# Patient Record
Sex: FEMALE | Race: WHITE | NOT HISPANIC OR LATINO | Employment: FULL TIME | URBAN - METROPOLITAN AREA
[De-identification: names, ages, dates, MRNs, and addresses within clinical notes are randomized per-mention and may not be internally consistent; named-entity substitution may affect disease eponyms.]

---

## 2021-07-06 ENCOUNTER — HOSPITAL ENCOUNTER (OUTPATIENT)
Dept: RADIOLOGY | Facility: MEDICAL CENTER | Age: 76
End: 2021-07-06
Payer: MEDICARE

## 2021-07-06 ENCOUNTER — HOSPITAL ENCOUNTER (INPATIENT)
Facility: MEDICAL CENTER | Age: 76
LOS: 3 days | DRG: 552 | End: 2021-07-09
Attending: EMERGENCY MEDICINE | Admitting: STUDENT IN AN ORGANIZED HEALTH CARE EDUCATION/TRAINING PROGRAM
Payer: MEDICARE

## 2021-07-06 DIAGNOSIS — R53.81 DEBILITY: ICD-10-CM

## 2021-07-06 DIAGNOSIS — S22.069A CLOSED FRACTURE OF EIGHTH THORACIC VERTEBRA, UNSPECIFIED FRACTURE MORPHOLOGY, INITIAL ENCOUNTER (HCC): ICD-10-CM

## 2021-07-06 DIAGNOSIS — S22.062A: ICD-10-CM

## 2021-07-06 LAB — MAGNESIUM SERPL-MCNC: 2.3 MG/DL (ref 1.5–2.5)

## 2021-07-06 PROCEDURE — 700102 HCHG RX REV CODE 250 W/ 637 OVERRIDE(OP): Performed by: STUDENT IN AN ORGANIZED HEALTH CARE EDUCATION/TRAINING PROGRAM

## 2021-07-06 PROCEDURE — A9270 NON-COVERED ITEM OR SERVICE: HCPCS | Performed by: STUDENT IN AN ORGANIZED HEALTH CARE EDUCATION/TRAINING PROGRAM

## 2021-07-06 PROCEDURE — 99285 EMERGENCY DEPT VISIT HI MDM: CPT

## 2021-07-06 PROCEDURE — 36415 COLL VENOUS BLD VENIPUNCTURE: CPT

## 2021-07-06 PROCEDURE — 83735 ASSAY OF MAGNESIUM: CPT

## 2021-07-06 PROCEDURE — 96374 THER/PROPH/DIAG INJ IV PUSH: CPT

## 2021-07-06 PROCEDURE — L0150 CERV SEMI-RIG ADJ MOLDED CHN: HCPCS

## 2021-07-06 PROCEDURE — 700102 HCHG RX REV CODE 250 W/ 637 OVERRIDE(OP): Performed by: NURSE PRACTITIONER

## 2021-07-06 PROCEDURE — 770006 HCHG ROOM/CARE - MED/SURG/GYN SEMI*

## 2021-07-06 PROCEDURE — 700111 HCHG RX REV CODE 636 W/ 250 OVERRIDE (IP): Performed by: STUDENT IN AN ORGANIZED HEALTH CARE EDUCATION/TRAINING PROGRAM

## 2021-07-06 PROCEDURE — 700105 HCHG RX REV CODE 258: Performed by: HOSPITALIST

## 2021-07-06 PROCEDURE — 99223 1ST HOSP IP/OBS HIGH 75: CPT | Mod: AI | Performed by: STUDENT IN AN ORGANIZED HEALTH CARE EDUCATION/TRAINING PROGRAM

## 2021-07-06 PROCEDURE — L0458 TLSO 2MOD SYMPHIS-XIPHO PRE: HCPCS

## 2021-07-06 PROCEDURE — A9270 NON-COVERED ITEM OR SERVICE: HCPCS | Performed by: NURSE PRACTITIONER

## 2021-07-06 RX ORDER — AMOXICILLIN 250 MG
2 CAPSULE ORAL 2 TIMES DAILY
Status: DISCONTINUED | OUTPATIENT
Start: 2021-07-06 | End: 2021-07-09 | Stop reason: HOSPADM

## 2021-07-06 RX ORDER — ACETAMINOPHEN 325 MG/1
650 TABLET ORAL EVERY 6 HOURS PRN
Status: DISCONTINUED | OUTPATIENT
Start: 2021-07-06 | End: 2021-07-09 | Stop reason: HOSPADM

## 2021-07-06 RX ORDER — ATENOLOL 50 MG/1
50 TABLET ORAL DAILY
Status: DISCONTINUED | OUTPATIENT
Start: 2021-07-06 | End: 2021-07-09 | Stop reason: HOSPADM

## 2021-07-06 RX ORDER — VITAMIN E 268 MG
400 CAPSULE ORAL DAILY
Status: ON HOLD | COMMUNITY
End: 2021-07-17

## 2021-07-06 RX ORDER — BISACODYL 10 MG
10 SUPPOSITORY, RECTAL RECTAL
Status: DISCONTINUED | OUTPATIENT
Start: 2021-07-06 | End: 2021-07-09 | Stop reason: HOSPADM

## 2021-07-06 RX ORDER — HEPARIN SODIUM 5000 [USP'U]/ML
5000 INJECTION, SOLUTION INTRAVENOUS; SUBCUTANEOUS EVERY 8 HOURS
Status: DISCONTINUED | OUTPATIENT
Start: 2021-07-06 | End: 2021-07-09 | Stop reason: HOSPADM

## 2021-07-06 RX ORDER — METHOCARBAMOL 500 MG/1
500 TABLET, FILM COATED ORAL 4 TIMES DAILY
Status: DISCONTINUED | OUTPATIENT
Start: 2021-07-06 | End: 2021-07-06

## 2021-07-06 RX ORDER — OXYCODONE HYDROCHLORIDE 5 MG/1
2.5 TABLET ORAL
Status: DISCONTINUED | OUTPATIENT
Start: 2021-07-06 | End: 2021-07-07

## 2021-07-06 RX ORDER — ENALAPRILAT 1.25 MG/ML
1.25 INJECTION INTRAVENOUS EVERY 6 HOURS PRN
Status: DISCONTINUED | OUTPATIENT
Start: 2021-07-06 | End: 2021-07-09 | Stop reason: HOSPADM

## 2021-07-06 RX ORDER — HYDROMORPHONE HYDROCHLORIDE 1 MG/ML
0.25 INJECTION, SOLUTION INTRAMUSCULAR; INTRAVENOUS; SUBCUTANEOUS
Status: DISCONTINUED | OUTPATIENT
Start: 2021-07-06 | End: 2021-07-07

## 2021-07-06 RX ORDER — IBUPROFEN 200 MG
400-800 TABLET ORAL DAILY
Status: ON HOLD | COMMUNITY
End: 2021-07-09

## 2021-07-06 RX ORDER — SODIUM CHLORIDE 9 MG/ML
INJECTION, SOLUTION INTRAVENOUS CONTINUOUS
Status: DISCONTINUED | OUTPATIENT
Start: 2021-07-06 | End: 2021-07-07

## 2021-07-06 RX ORDER — POLYETHYLENE GLYCOL 3350 17 G/17G
1 POWDER, FOR SOLUTION ORAL
Status: DISCONTINUED | OUTPATIENT
Start: 2021-07-06 | End: 2021-07-09 | Stop reason: HOSPADM

## 2021-07-06 RX ORDER — FLUTICASONE PROPIONATE 50 MCG
1 SPRAY, SUSPENSION (ML) NASAL
Status: ON HOLD | COMMUNITY
End: 2021-07-17

## 2021-07-06 RX ORDER — DIAZEPAM 5 MG/1
5 TABLET ORAL EVERY 6 HOURS PRN
Status: DISCONTINUED | OUTPATIENT
Start: 2021-07-06 | End: 2021-07-09 | Stop reason: HOSPADM

## 2021-07-06 RX ORDER — OXYCODONE HYDROCHLORIDE 5 MG/1
5 TABLET ORAL
Status: DISCONTINUED | OUTPATIENT
Start: 2021-07-06 | End: 2021-07-07

## 2021-07-06 RX ORDER — ONDANSETRON 4 MG/1
4 TABLET, ORALLY DISINTEGRATING ORAL EVERY 4 HOURS PRN
Status: DISCONTINUED | OUTPATIENT
Start: 2021-07-06 | End: 2021-07-09 | Stop reason: HOSPADM

## 2021-07-06 RX ORDER — ATENOLOL 50 MG/1
50 TABLET ORAL DAILY
COMMUNITY

## 2021-07-06 RX ORDER — LABETALOL HYDROCHLORIDE 5 MG/ML
10 INJECTION, SOLUTION INTRAVENOUS EVERY 4 HOURS PRN
Status: DISCONTINUED | OUTPATIENT
Start: 2021-07-06 | End: 2021-07-09 | Stop reason: HOSPADM

## 2021-07-06 RX ORDER — CYCLOBENZAPRINE HCL 10 MG
10 TABLET ORAL 3 TIMES DAILY PRN
Status: DISCONTINUED | OUTPATIENT
Start: 2021-07-06 | End: 2021-07-07

## 2021-07-06 RX ORDER — M-VIT,TX,IRON,MINS/CALC/FOLIC 27MG-0.4MG
1 TABLET ORAL DAILY
Status: ON HOLD | COMMUNITY
End: 2021-07-17

## 2021-07-06 RX ORDER — ONDANSETRON 2 MG/ML
4 INJECTION INTRAMUSCULAR; INTRAVENOUS EVERY 4 HOURS PRN
Status: DISCONTINUED | OUTPATIENT
Start: 2021-07-06 | End: 2021-07-09 | Stop reason: HOSPADM

## 2021-07-06 RX ADMIN — OXYCODONE 5 MG: 5 TABLET ORAL at 20:23

## 2021-07-06 RX ADMIN — HYDROMORPHONE HYDROCHLORIDE 0.25 MG: 1 INJECTION, SOLUTION INTRAMUSCULAR; INTRAVENOUS; SUBCUTANEOUS at 05:37

## 2021-07-06 RX ADMIN — CYCLOBENZAPRINE 10 MG: 10 TABLET, FILM COATED ORAL at 14:19

## 2021-07-06 RX ADMIN — HYDROMORPHONE HYDROCHLORIDE 0.25 MG: 1 INJECTION, SOLUTION INTRAMUSCULAR; INTRAVENOUS; SUBCUTANEOUS at 22:05

## 2021-07-06 RX ADMIN — DOCUSATE SODIUM 50 MG AND SENNOSIDES 8.6 MG 2 TABLET: 8.6; 5 TABLET, FILM COATED ORAL at 06:58

## 2021-07-06 RX ADMIN — HYDROMORPHONE HYDROCHLORIDE 0.25 MG: 1 INJECTION, SOLUTION INTRAMUSCULAR; INTRAVENOUS; SUBCUTANEOUS at 15:52

## 2021-07-06 RX ADMIN — HEPARIN SODIUM 5000 UNITS: 5000 INJECTION, SOLUTION INTRAVENOUS; SUBCUTANEOUS at 06:58

## 2021-07-06 RX ADMIN — HYDROMORPHONE HYDROCHLORIDE 0.25 MG: 1 INJECTION, SOLUTION INTRAMUSCULAR; INTRAVENOUS; SUBCUTANEOUS at 11:42

## 2021-07-06 RX ADMIN — OXYCODONE 5 MG: 5 TABLET ORAL at 10:57

## 2021-07-06 RX ADMIN — SODIUM CHLORIDE: 9 INJECTION, SOLUTION INTRAVENOUS at 09:45

## 2021-07-06 RX ADMIN — HEPARIN SODIUM 5000 UNITS: 5000 INJECTION, SOLUTION INTRAVENOUS; SUBCUTANEOUS at 22:05

## 2021-07-06 RX ADMIN — OXYCODONE 5 MG: 5 TABLET ORAL at 14:19

## 2021-07-06 RX ADMIN — ATENOLOL 50 MG: 50 TABLET ORAL at 06:58

## 2021-07-06 ASSESSMENT — PAIN DESCRIPTION - PAIN TYPE
TYPE: ACUTE PAIN

## 2021-07-06 NOTE — THERAPY
Missed Therapy     Patient Name: Peg Louie  Age:  76 y.o., Sex:  female  Medical Record #: [id number]  Today's Date: 7/6/2021    Discussed missed therapy with RN    OT consult rec'd. No formal orders of when to wear TLSO, and no mention in neurosurgery note/chart of cervical fx and bracing/possible intervention, although RN reports c-collar on AAT. Will hold eval and will re-attempt as appropriate/able once brace orders placed and pt has definitive POC.

## 2021-07-06 NOTE — PROGRESS NOTES
Received bedside report from night shift nurse. Patient resting in bed, call light and personal belongings within reach, bed in the low and locked position with upper side rails up. Assessment complete, vital signs stable, all needs met at this time. Hourly rounding in place, plan of care discussed with patient, patient verbalized understanding.

## 2021-07-06 NOTE — CARE PLAN
Problem: Pain - Standard  Goal: Alleviation of pain or a reduction in pain to the patient’s comfort goal  Outcome: Progressing     Problem: Fall Risk  Goal: Patient will remain free from falls  Outcome: Progressing   The patient is Stable - Low risk of patient condition declining or worsening

## 2021-07-06 NOTE — CONSULTS
DATE OF SERVICE:  07/06/2021     NEUROSURGERY CONSULTATION     REASON FOR CONSULTATION:  T7 compression fracture.     HISTORY OF PRESENT ILLNESS:  This is a 76-year-old woman who presents in   direct transfer from _____after having a ground level fall this afternoon and   having onset of back pain.  The patient appears to have baseline dementia, was   reported to be ambulatory prior to this fall and was ambulating afterwards.   She arrives in a C-collar, in a state of delirium.  Otherwise, she is able to   follow commands with wiggling her toes.  She has no focal deficits.    Neurosurgery was consulted to evaluate the T7 fracture.     REVIEW OF SYSTEMS:  A 12-point review of systems negative.     PAST MEDICAL HISTORY:  Unknown.     PAST SURGICAL HISTORY:  Noncontributory.     MEDICATIONS:  Please see EMR.     PHYSICAL EXAMINATION:    NEUROLOGIC:  The patient is awake.  She is very disoriented.  She is   perseverating for water.  She will follow commands and move her legs,   particularly to painful stimuli.  There is no focal deficit in the bilateral   lower extremities, bilateral upper extremities and appears to be   neurologically intact.  BACK:  The patient is complaining of back pain.     IMAGING:  CT of the thoracic spine shows a mid thoracic compression fracture   approximately the level of T7 due to ground level fall, osteoporotic fracture,   no splaying of the facets, there appears to be possibly a spinous process   fracture at the same level.     ASSESSMENT AND PLAN:  At this time, it would be reasonable to place the   patient in a TLSO brace and then have her follow up in 6 weeks with standing   films in clinic for clearance.  She can have standing films tomorrow in the   brace prior to discharge for documentation.  It would be reasonable to get   physical therapy and occupational therapy on the patient prior to discharge to   ensure that she is mobilizing well in the brace and tolerating the pain from    the fracture. At this time, there is no neurosurgical intervention.  She   should have a trial of conservative treatment prior to moving forward with any   sort of intervention such as kyphoplasty or other interventions.     Total of 50 minutes were spent in direct patient care, coordination and   consultation.        ______________________________  MD TIM Meyers/[place]    DD:  07/06/2021 03:59  DT:  07/06/2021 05:08    Job#:  [id number]

## 2021-07-06 NOTE — PROGRESS NOTES
Assumed care of patient. Plan of care discussed, questions answered. Assessment completed. VSS, A&O x4, states pain on mid back. PRN med ordered. Call light in reach. Patient educated on use of call light for assistance.    Cervical collar on at all times.

## 2021-07-06 NOTE — ED NOTES
Patient states she was at the back of a camper van when she tried to reach to close the door and fell out. Truck her forehead. C/o neck and severe back pain.

## 2021-07-06 NOTE — ASSESSMENT & PLAN NOTE
T8-Severe anterior wedge compression fracture with bursting pattern, greater than 50% height loss..  Posterior wall retropulsed 5 mm.  Spinal canal not significantly narrowed.  Right superior articular facet and right transverse process or fracture.  This fracture involves all 3 columns and is therefore unstable.  T7 spinous fracture at its tip.  Hemangioma T11 vertebral body.  -- neurosurgery has evaluated the patient, stated no surgical intervention    --Multimodal pain management, continue gabapentin, tramadol, oxycodone, and muscle relaxant.  PT/OT has evaluated, recommend postacute, SNF treatment in place/physiatry referral in place  Case has been discussed with patient, her daughter and NS/case  Management     Patient stated that her pain has been better controlled  X-ray of the C-spine, T-spine, L-spine pending

## 2021-07-06 NOTE — PROGRESS NOTES
This is 76-year-old female with a past medical history significant for obesity with Body mass index is 39.14 kg/m².  , arrhythmia, htn Presented to ER on 7/6/2021 [facility] facility after standard ground-level fall.    She reports that he was trying to close the door while sitting on the side of the bed, she lost balance ; fell into the ground hitting her head and back.    CT of the thoracic spine: T8-Severe anterior wedge compression fracture with bursting pattern, greater than 50% height loss..  Posterior wall retropulsed 5 mm T7 spinous fracture at its tip.   CT head: No evidence of mass or bleed.  CT cervical spine, DJD without acute fractures or dislocations.  CT of the abdomen/pelvis: No abdominal injury found, large hiatal hernia.    Neurosurgery Dr. Eduardo was consulted who recommended no surgical intervention.  Recommended obtaining T SLO brace, PT/OT ; and pain management.     Plan:  Compression fracture: PT/OT, pain management, muscle relaxant, T LSO brace as per neurosurgery recommendation  --No DVT prophylaxis/ nsaid

## 2021-07-06 NOTE — ED TRIAGE NOTES
Peg Louie  76 y.o.  female  Chief Complaint   Patient presents with   • T-5000 FALL     brought in by Med flight transfer from [facility]. patient sustrained C 7 and T 8 unstable fractures s/p Fall from a van. denies loc. Neuro intact. arrived on [facility] collar.

## 2021-07-06 NOTE — PROGRESS NOTES
Attending Hospitalist is Dr Pham starting at 0700. Please contact this physician for orders, updates or questions today.

## 2021-07-06 NOTE — ED PROVIDER NOTES
ED Provider Note    ED Provider Note    Primary care provider: No primary care provider on file.  Means of arrival: EMS  History obtained from: Patient    CHIEF COMPLAINT  Chief Complaint   Patient presents with   • T-5000 FALL     brought in by Med flight transfer from [facility]. patient sustrained C 7 and T 8 unstable fractures s/p Fall from a van. denies loc. Neuro intact. arrived on [facility] collar.     Seen at 2:29 AM.   HPI  Peg Louie is a 76 y.o. female who presents to the Emergency Department as a transfer from [facility] for unstable thoracic spine fractures.    This is a 76-year-old female who was evaluated at the outside facility after a fall.  She fell out of a camper van onto her back.  She had severe thoracic back pain.  She denies any headache, nausea, vomiting, numbness, focal weakness.  She was ambulatory for a few steps following the incident.  She does not take anticoagulation or antiplatelet agents.    The patient underwent CT of the chest abdomen pelvis, head, cervical and thoracic spine.  CT of the thoracic spine: T8-Severe anterior wedge compression fracture with bursting pattern, greater than 50% height loss..  Posterior wall retropulsed 5 mm.  Spinal canal not significantly narrowed.  Right superior articular facet and right transverse process or fracture.  This fracture involves all 3 columns and is therefore unstable.  T7 spinous fracture at its tip.  Hemangioma T11 vertebral body.    CT head: No evidence of mass or bleed.  CT cervical spine, DJD without acute fractures or dislocations.  CT of the abdomen/pelvis: No abdominal injury found, large hiatal hernia.  Labs from the sending facility: Sodium 140, potassium 4.6, chloride 104, CO2 23, glucose 126, BUN 25, creatinine 1.19, calcium 9.4, LFTs WNL, WBC 11.5, Hgb 12.1 HCT 37, platelets 198, differential WNL.  INR 0.94.  UA WNL.    REVIEW OF SYSTEMS  See HPI,   Remainder of ROS negative.     PAST MEDICAL HISTORY   has a past  "medical history of Irregular heart beat.    SURGICAL HISTORY   has a past surgical history that includes primary c section; cataract extraction with iol; and other orthopedic surgery.    SOCIAL HISTORY  Social History     Tobacco Use   • Smoking status: Never Smoker   • Smokeless tobacco: Never Used   Vaping Use   • Vaping Use: Never used   Substance Use Topics   • Alcohol use: Yes     Comment: 0nce or twice a week   • Drug use: Never      Social History     Substance and Sexual Activity   Drug Use Never       FAMILY HISTORY  History reviewed. No pertinent family history.    CURRENT MEDICATIONS  Reviewed.  See Encounter Summary.     ALLERGIES  No Known Allergies    PHYSICAL EXAM  VITAL SIGNS: /75   Pulse 67   Temp 37 °C (98.6 °F) (Temporal)   Resp 12   Ht 1.676 m (5' 6\")   Wt 107 kg (235 lb)   SpO2 94%   BMI 37.93 kg/m²   Constitutional: Awake, alert in no apparent distress.  [place] collar present.  HENT: Normocephalic, Bilateral external ears normal. Nose normal.   Eyes: Conjunctiva normal, non-icteric, EOMI.    Thorax & Lungs: Easy unlabored respirations, Clear to ascultation bilaterally.  Cardiovascular: Regular rate, Regular rhythm, No murmurs, rubs or gallops. Bilateral pulses symmetrical.   Abdomen:  Soft, nontender, nondistended, normal active bowel sounds.   :    Skin: Visualized skin is  Dry, No erythema, No rash.   Musculoskeletal:   No cyanosis, clubbing or edema. No leg asymmetry.   Neurologic: Alert, Grossly non-focal.  GCS 15, normal speech, sensation intact to light touch throughout, moving all 4 extremities equally without difficulty.  Psychiatric: Normal affect, Normal mood  Lymphatic:  No cervical LAD        RADIOLOGY  OUTSIDE IMAGES-CT CHEST   Final Result      OUTSIDE IMAGES-CT HEAD   Final Result      OUTSIDE IMAGES-CT CERVICAL SPINE   Final Result      OUTSIDE IMAGES-CT ABDOMEN /PELVIS   Final Result            COURSE & MEDICAL DECISION MAKING  Pertinent Labs & Imaging studies " reviewed. (See chart for details)      2:29 AM - Medical record reviewed, patient seen and examined at bedside.    2:42 AM: Case discussed with Dr. Eduardo who is currently scrubbed in on a cauda equina case upstairs.  He will be completed in about 90 minutes and can evaluate the patient at that time.    4 AM: Case discussed with Dr. Eduardo who evaluated the patient.  The patient could potentially be discharged however she will need OT and physical therapy evaluation and fitting for TLSO brace.    4:30 AM: Case discussed with Dr. Reed regarding observation for the above.  He graciously will evaluate the patient for admission.    Decision Making:  This is a pleasant 76 y.o. year old female who presents after mechanical fall.  The patient had a full evaluation at the sending facility to include a CT of the chest abdomen pelvis, head, C-spine and T-spine.  Isolated injuries were noted to the T-spine as above.  The patient is neurologically intact.  Dr. Eduardo has evaluated her and recommends discharge after PT, OT and TLSO fitting.  He will follow her in the clinic after a few weeks of therapy.    Disposition: Anticipate observation.      FINAL IMPRESSION  1. Closed unstable burst fracture of eighth thoracic vertebra, initial encounter ([facility])

## 2021-07-06 NOTE — H&P
"Hospital Medicine History & Physical Note    Date of Service  7/6/2021    Primary Care Physician  No primary care provider on file.    Consultants  neurosurgery    Specialist Names: Dr. Eduardo consulted by ED, to see patient in 90min after finishing a surgery.    Code Status  No Order    Chief Complaint  Chief Complaint   Patient presents with   • T-5000 FALL     brought in by Med flight transfer from [facility]. patient sustrained C 7 and T 8 unstable fractures s/p Fall from a van. denies loc. Neuro intact. arrived on [place] J collar.       History of Presenting Illness  Patient transferred here for a T7, T8 fracture from [facility] which is unstable. There is no additional information obtained in my interview that is not in the ED note by Dr. Ramey quoted below which includes labs and summary from [facility]:    \"This is a 76-year-old female who was evaluated at the outside facility after a fall.  She fell out of a camper van onto her back.  She had severe thoracic back pain.  She denies any headache, nausea, vomiting, numbness, focal weakness.  She was ambulatory for a few steps following the incident.  She does not take anticoagulation or antiplatelet agents.     The patient underwent CT of the chest abdomen pelvis, head, cervical and thoracic spine.  CT of the thoracic spine: T8-Severe anterior wedge compression fracture with bursting pattern, greater than 50% height loss..  Posterior wall retropulsed 5 mm.  Spinal canal not significantly narrowed.  Right superior articular facet and right transverse process or fracture.  This fracture involves all 3 columns and is therefore unstable.  T7 spinous fracture at its tip.  Hemangioma T11 vertebral body.      CT head: No evidence of mass or bleed.  CT cervical spine, DJD without acute fractures or dislocations.  CT of the abdomen/pelvis: No abdominal injury found, large hiatal hernia.  Labs from the sending facility: Sodium 140, potassium 4.6, " "chloride 104, CO2 23, glucose 126, BUN 25, creatinine 1.19, calcium 9.4, LFTs WNL, WBC 11.5, Hgb 12.1 HCT 37, platelets 198, differential WNL.  INR 0.94.  UA WNL.\"    Will admit patient for eval by Neurosurgery with intervention vs. Brace fitting, pain management, and obs overnight as [place] is long distance to travel back.    I discussed the plan of care with patient.    Review of Systems  ROS  All systems reviewed and negative except as noted in HPI.    Past Medical History   has a past medical history of Irregular heart beat.    Surgical History   has a past surgical history that includes primary c section; cataract extraction with iol; and other orthopedic surgery.     Family History  Family history reviewed with patient. There is no family history that is pertinent to the chief complaint.     Social History   reports that she has never smoked. She has never used smokeless tobacco. She reports current alcohol use. She reports that she does not use drugs.    Allergies  No Known Allergies    Medications  Prior to Admission Medications   Prescriptions Last Dose Informant Patient Reported? Taking?   Ascorbic Acid (VITAMIN C PO) 7/5/2021 at AM Patient Yes Yes   Sig: Take 1 tablet by mouth every morning.   B Complex Vitamins (VITAMIN B COMPLEX PO) 7/5/2021 at AM Patient Yes Yes   Sig: Take 1 capsule by mouth every morning.   CALCIUM PO 7/5/2021 at AM Patient Yes Yes   Sig: Take 1 tablet by mouth every day.   POTASSIUM PO 7/5/2021 at AM Patient Yes Yes   Sig: Take 1 tablet by mouth every day.   atenolol (TENORMIN) 50 MG Tab 7/5/2021 at AM Patient Yes Yes   Sig: Take 50 mg by mouth every day.   fluticasone (FLONASE) 50 MCG/ACT nasal spray 7/4/2021 at PM Patient Yes Yes   Sig: Administer 1 Spray into affected nostril(S) at bedtime.   ibuprofen (MOTRIN) 200 MG Tab 7/5/2021 at AM Patient Yes Yes   Sig: Take 400-800 mg by mouth every day.   therapeutic multivitamin-minerals (THERAGRAN-M) Tab 7/5/2021 at AM Patient Yes Yes "   Sig: Take 1 tablet by mouth every day.   vitamin e (VITAMIN E) 400 UNIT Cap 7/5/2021 at AM Patient Yes Yes   Sig: Take 400 Units by mouth every day.      Facility-Administered Medications: None       Physical Exam  Temp:  [37 °C (98.6 °F)] 37 °C (98.6 °F)  Pulse:  [67-73] 67  Resp:  [12-20] 12  BP: (125-140)/(63-75) 137/75  SpO2:  [93 %-95 %] 94 %    Physical Exam  Physical Exam  Vitals and nursing note reviewed.  Constitutional:     General: Alert in no acute distress.    Appearance: Pt is not ill-appearing.     Comments: [place] J collar  HENT: No signs of trauma, Bilateral external ears normal, Nose normal.      Head: Normocephalic.     Mouth/Throat: Unremarkable. Moist Mucosa     Comments:   Eyes:      Pupils: Pupils are equal round and reactive to light, Conjunctiva normal, Non-icteric.   Neck: Normal range of motion, No tenderness, Supple, No stridor.   Cardiovascular:      Rate and Rhythm: Regular Rate and Rhythm     Heart sounds: No murmur, rubs, or gallops appreciated.  Pulmonary/Thorax & Lungs:      Effort: No respiratory distress.     Breath sounds: No stridor. No wheezing, No Rhonchi, no palpable chest tenderness     Comments:    Abdomen:     General: There is no distension.      Palpation/Auscultation: Soft, No tenderness, No masses, No pulsatile masses. Bowel sounds normal. No rebound/peritoneal signs. Negative Parikh sign.     Hernia: No hernia is appreciated at this time.   Skin: Warm, Dry, No erythema, No rash.       Coloration: Skin is not jaundiced or pale.   Back: No bony tenderness, No CVA tenderness.   Musculoskeletal:         General: No swelling or tenderness.   Extremities:       General: Intact distal pulses, No edema, No tenderness, No cyanosis      Vascular:   Neurologic/Psych: Alert, No focal deficits noted.       Comments:     moving all 4 extremities equally without difficulty         Laboratory:          No results for input(s): ALTSGPT, ASTSGOT, ALKPHOSPHAT, TBILIRUBIN, DBILIRUBIN,  GAMMAGT, AMYLASE, LIPASE, ALB, PREALBUMIN, GLUCOSE in the last 72 hours.      No results for input(s): NTPROBNP in the last 72 hours.      No results for input(s): TROPONINT in the last 72 hours.    Imaging:  OUTSIDE IMAGES-CT CHEST   Final Result      OUTSIDE IMAGES-CT HEAD   Final Result      OUTSIDE IMAGES-CT CERVICAL SPINE   Final Result      OUTSIDE IMAGES-CT ABDOMEN /PELVIS   Final Result          X-Ray:  I have personally reviewed the images and compared with prior images.  EKG:  I have personally reviewed the images and compared with prior images.    Assessment/Plan:  I anticipate this patient is appropriate for observation status at this time.    Spinal fracture of T8 vertebra (HCC)- (present on admission)  Assessment & Plan  - CT imaging as summarized above.  T8-Severe anterior wedge compression fracture with bursting pattern, greater than 50% height loss..  Posterior wall retropulsed 5 mm.  Spinal canal not significantly narrowed.  Right superior articular facet and right transverse process or fracture.  This fracture involves all 3 columns and is therefore unstable.  T7 spinous fracture at its tip.  Hemangioma T11 vertebral body.  >Pain Control  >Eval by Neurosurg to be performed by Dr. Eduardo after he gets out of surgery per ED physician who consulted him.  Consider brace    Spinal fracture of T7 vertebra (HCC)  Assessment & Plan  T8-Severe anterior wedge compression fracture with bursting pattern, greater than 50% height loss..  Posterior wall retropulsed 5 mm.  Spinal canal not significantly narrowed.  Right superior articular facet and right transverse process or fracture.  This fracture involves all 3 columns and is therefore unstable.  T7 spinous fracture at its tip.  Hemangioma T11 vertebral body.  >Pain Control  >Eval by Neurosurg to be performed by Dr. Eduardo after he gets out of surgery per ED physician who consulted him.  Consider brace  - CT imaging as summarized above.      VTE prophylaxis:  heparin ppx

## 2021-07-06 NOTE — ED NOTES
Med Rec completed: per patient at bedside  Preferred Pharmacy: Unknown patient is from [place]   Allergies:  No Known Allergies    No ORAL antibiotics in last 14 days    Home Medications:  Medication Sig Comments   • atenolol (TENORMIN) 50 MG Tab Take 50 mg by mouth every day.    • ibuprofen (MOTRIN) 200 MG Tab Take 400-800 mg by mouth every day.    • fluticasone (FLONASE) 50 MCG/ACT nasal spray Administer 1 Spray into affected nostril(S) at bedtime.    • therapeutic multivitamin-minerals (THERAGRAN-M) Tab Take 1 tablet by mouth every day.    • Ascorbic Acid (VITAMIN C PO) Take 1 tablet by mouth every morning.    • B Complex Vitamins (VITAMIN B COMPLEX PO) Take 1 capsule by mouth every morning.    • vitamin e (VITAMIN E) 400 UNIT Cap Take 400 Units by mouth every day.    • CALCIUM PO Take 1 tablet by mouth every day.    • POTASSIUM PO Take 1 tablet by mouth every day.

## 2021-07-07 PROBLEM — E66.9 OBESITY: Status: ACTIVE | Noted: 2021-07-07

## 2021-07-07 PROBLEM — I49.9 ARRHYTHMIA: Status: ACTIVE | Noted: 2021-07-07

## 2021-07-07 PROBLEM — I10 HTN (HYPERTENSION): Status: ACTIVE | Noted: 2021-07-07

## 2021-07-07 PROBLEM — R53.81 DEBILITY: Status: ACTIVE | Noted: 2021-07-07

## 2021-07-07 PROBLEM — E87.1 HYPONATREMIA: Status: ACTIVE | Noted: 2021-07-07

## 2021-07-07 PROBLEM — D64.9 ANEMIA: Status: ACTIVE | Noted: 2021-07-07

## 2021-07-07 LAB
ALBUMIN SERPL BCP-MCNC: 3.9 G/DL (ref 3.2–4.9)
ALBUMIN/GLOB SERPL: 1.3 G/DL
ALP SERPL-CCNC: 74 U/L (ref 30–99)
ALT SERPL-CCNC: 15 U/L (ref 2–50)
ANION GAP SERPL CALC-SCNC: 8 MMOL/L (ref 7–16)
AST SERPL-CCNC: 24 U/L (ref 12–45)
BASOPHILS # BLD AUTO: 0.3 % (ref 0–1.8)
BASOPHILS # BLD: 0.02 K/UL (ref 0–0.12)
BILIRUB SERPL-MCNC: 0.5 MG/DL (ref 0.1–1.5)
BUN SERPL-MCNC: 19 MG/DL (ref 8–22)
CALCIUM SERPL-MCNC: 8.8 MG/DL (ref 8.5–10.5)
CHLORIDE SERPL-SCNC: 98 MMOL/L (ref 96–112)
CHOLEST SERPL-MCNC: 187 MG/DL (ref 100–199)
CO2 SERPL-SCNC: 24 MMOL/L (ref 20–33)
CREAT SERPL-MCNC: 0.73 MG/DL (ref 0.5–1.4)
EOSINOPHIL # BLD AUTO: 0.02 K/UL (ref 0–0.51)
EOSINOPHIL NFR BLD: 0.3 % (ref 0–6.9)
ERYTHROCYTE [DISTWIDTH] IN BLOOD BY AUTOMATED COUNT: 48.9 FL (ref 35.9–50)
GLOBULIN SER CALC-MCNC: 2.9 G/DL (ref 1.9–3.5)
GLUCOSE SERPL-MCNC: 108 MG/DL (ref 65–99)
HCT VFR BLD AUTO: 32.6 % (ref 37–47)
HDLC SERPL-MCNC: 58 MG/DL
HGB BLD-MCNC: 10.3 G/DL (ref 12–16)
IMM GRANULOCYTES # BLD AUTO: 0.03 K/UL (ref 0–0.11)
IMM GRANULOCYTES NFR BLD AUTO: 0.5 % (ref 0–0.9)
LDLC SERPL CALC-MCNC: 101 MG/DL
LYMPHOCYTES # BLD AUTO: 1.01 K/UL (ref 1–4.8)
LYMPHOCYTES NFR BLD: 15.6 % (ref 22–41)
MCH RBC QN AUTO: 27.1 PG (ref 27–33)
MCHC RBC AUTO-ENTMCNC: 31.6 G/DL (ref 33.6–35)
MCV RBC AUTO: 85.8 FL (ref 81.4–97.8)
MONOCYTES # BLD AUTO: 0.39 K/UL (ref 0–0.85)
MONOCYTES NFR BLD AUTO: 6 % (ref 0–13.4)
NEUTROPHILS # BLD AUTO: 4.99 K/UL (ref 2–7.15)
NEUTROPHILS NFR BLD: 77.3 % (ref 44–72)
NRBC # BLD AUTO: 0 K/UL
NRBC BLD-RTO: 0 /100 WBC
PHOSPHATE SERPL-MCNC: 2.8 MG/DL (ref 2.5–4.5)
PLATELET # BLD AUTO: 152 K/UL (ref 164–446)
PMV BLD AUTO: 9.9 FL (ref 9–12.9)
POTASSIUM SERPL-SCNC: 4.1 MMOL/L (ref 3.6–5.5)
PROT SERPL-MCNC: 6.8 G/DL (ref 6–8.2)
RBC # BLD AUTO: 3.8 M/UL (ref 4.2–5.4)
SODIUM SERPL-SCNC: 130 MMOL/L (ref 135–145)
TRIGL SERPL-MCNC: 142 MG/DL (ref 0–149)
WBC # BLD AUTO: 6.5 K/UL (ref 4.8–10.8)

## 2021-07-07 PROCEDURE — A9270 NON-COVERED ITEM OR SERVICE: HCPCS | Performed by: INTERNAL MEDICINE

## 2021-07-07 PROCEDURE — A9270 NON-COVERED ITEM OR SERVICE: HCPCS | Performed by: NURSE PRACTITIONER

## 2021-07-07 PROCEDURE — 99233 SBSQ HOSP IP/OBS HIGH 50: CPT | Performed by: HOSPITALIST

## 2021-07-07 PROCEDURE — 80053 COMPREHEN METABOLIC PANEL: CPT

## 2021-07-07 PROCEDURE — 700102 HCHG RX REV CODE 250 W/ 637 OVERRIDE(OP): Performed by: HOSPITALIST

## 2021-07-07 PROCEDURE — A9270 NON-COVERED ITEM OR SERVICE: HCPCS | Performed by: HOSPITALIST

## 2021-07-07 PROCEDURE — 700102 HCHG RX REV CODE 250 W/ 637 OVERRIDE(OP): Performed by: STUDENT IN AN ORGANIZED HEALTH CARE EDUCATION/TRAINING PROGRAM

## 2021-07-07 PROCEDURE — 85025 COMPLETE CBC W/AUTO DIFF WBC: CPT

## 2021-07-07 PROCEDURE — 36415 COLL VENOUS BLD VENIPUNCTURE: CPT

## 2021-07-07 PROCEDURE — A9270 NON-COVERED ITEM OR SERVICE: HCPCS | Performed by: STUDENT IN AN ORGANIZED HEALTH CARE EDUCATION/TRAINING PROGRAM

## 2021-07-07 PROCEDURE — 700111 HCHG RX REV CODE 636 W/ 250 OVERRIDE (IP): Performed by: STUDENT IN AN ORGANIZED HEALTH CARE EDUCATION/TRAINING PROGRAM

## 2021-07-07 PROCEDURE — 84100 ASSAY OF PHOSPHORUS: CPT

## 2021-07-07 PROCEDURE — 700105 HCHG RX REV CODE 258: Performed by: HOSPITALIST

## 2021-07-07 PROCEDURE — 80061 LIPID PANEL: CPT

## 2021-07-07 PROCEDURE — 700102 HCHG RX REV CODE 250 W/ 637 OVERRIDE(OP): Performed by: NURSE PRACTITIONER

## 2021-07-07 PROCEDURE — 700102 HCHG RX REV CODE 250 W/ 637 OVERRIDE(OP): Performed by: INTERNAL MEDICINE

## 2021-07-07 PROCEDURE — 97162 PT EVAL MOD COMPLEX 30 MIN: CPT

## 2021-07-07 PROCEDURE — 770006 HCHG ROOM/CARE - MED/SURG/GYN SEMI*

## 2021-07-07 PROCEDURE — 97535 SELF CARE MNGMENT TRAINING: CPT

## 2021-07-07 PROCEDURE — 97166 OT EVAL MOD COMPLEX 45 MIN: CPT

## 2021-07-07 RX ORDER — BACITRACIN ZINC 500 [USP'U]/G
OINTMENT TOPICAL 2 TIMES DAILY
Status: DISCONTINUED | OUTPATIENT
Start: 2021-07-07 | End: 2021-07-09 | Stop reason: HOSPADM

## 2021-07-07 RX ORDER — TRAMADOL HYDROCHLORIDE 50 MG/1
50 TABLET ORAL EVERY 6 HOURS PRN
Status: DISCONTINUED | OUTPATIENT
Start: 2021-07-07 | End: 2021-07-09 | Stop reason: HOSPADM

## 2021-07-07 RX ORDER — METHOCARBAMOL 750 MG/1
750 TABLET, FILM COATED ORAL 4 TIMES DAILY
Status: DISCONTINUED | OUTPATIENT
Start: 2021-07-07 | End: 2021-07-09 | Stop reason: HOSPADM

## 2021-07-07 RX ORDER — KETOROLAC TROMETHAMINE 30 MG/ML
30 INJECTION, SOLUTION INTRAMUSCULAR; INTRAVENOUS EVERY 6 HOURS PRN
Status: DISCONTINUED | OUTPATIENT
Start: 2021-07-07 | End: 2021-07-09 | Stop reason: HOSPADM

## 2021-07-07 RX ADMIN — CYCLOBENZAPRINE 10 MG: 10 TABLET, FILM COATED ORAL at 07:34

## 2021-07-07 RX ADMIN — HYDROMORPHONE HYDROCHLORIDE 0.25 MG: 1 INJECTION, SOLUTION INTRAMUSCULAR; INTRAVENOUS; SUBCUTANEOUS at 11:44

## 2021-07-07 RX ADMIN — Medication: at 21:00

## 2021-07-07 RX ADMIN — HEPARIN SODIUM 5000 UNITS: 5000 INJECTION, SOLUTION INTRAVENOUS; SUBCUTANEOUS at 14:44

## 2021-07-07 RX ADMIN — DOCUSATE SODIUM 50 MG AND SENNOSIDES 8.6 MG 2 TABLET: 8.6; 5 TABLET, FILM COATED ORAL at 18:05

## 2021-07-07 RX ADMIN — METHOCARBAMOL 750 MG: 750 TABLET ORAL at 22:01

## 2021-07-07 RX ADMIN — SODIUM CHLORIDE: 9 INJECTION, SOLUTION INTRAVENOUS at 03:34

## 2021-07-07 RX ADMIN — OXYCODONE 5 MG: 5 TABLET ORAL at 03:08

## 2021-07-07 RX ADMIN — TRAMADOL HYDROCHLORIDE 50 MG: 50 TABLET, FILM COATED ORAL at 11:49

## 2021-07-07 RX ADMIN — HEPARIN SODIUM 5000 UNITS: 5000 INJECTION, SOLUTION INTRAVENOUS; SUBCUTANEOUS at 21:00

## 2021-07-07 RX ADMIN — ATENOLOL 50 MG: 50 TABLET ORAL at 05:24

## 2021-07-07 RX ADMIN — OXYCODONE 5 MG: 5 TABLET ORAL at 18:04

## 2021-07-07 RX ADMIN — OXYCODONE 5 MG: 5 TABLET ORAL at 07:33

## 2021-07-07 RX ADMIN — DOCUSATE SODIUM 50 MG AND SENNOSIDES 8.6 MG 2 TABLET: 8.6; 5 TABLET, FILM COATED ORAL at 05:24

## 2021-07-07 RX ADMIN — HEPARIN SODIUM 5000 UNITS: 5000 INJECTION, SOLUTION INTRAVENOUS; SUBCUTANEOUS at 05:24

## 2021-07-07 RX ADMIN — CYCLOBENZAPRINE 10 MG: 10 TABLET, FILM COATED ORAL at 14:46

## 2021-07-07 RX ADMIN — OXYCODONE 5 MG: 5 TABLET ORAL at 14:44

## 2021-07-07 ASSESSMENT — COGNITIVE AND FUNCTIONAL STATUS - GENERAL
DRESSING REGULAR UPPER BODY CLOTHING: A LOT
SUGGESTED CMS G CODE MODIFIER DAILY ACTIVITY: CK
EATING MEALS: A LITTLE
WALKING IN HOSPITAL ROOM: A LITTLE
MOVING FROM LYING ON BACK TO SITTING ON SIDE OF FLAT BED: UNABLE
MOVING TO AND FROM BED TO CHAIR: UNABLE
CLIMB 3 TO 5 STEPS WITH RAILING: A LOT
TOILETING: A LOT
PERSONAL GROOMING: A LITTLE
SUGGESTED CMS G CODE MODIFIER MOBILITY: CL
DRESSING REGULAR LOWER BODY CLOTHING: A LOT
TURNING FROM BACK TO SIDE WHILE IN FLAT BAD: UNABLE
DAILY ACTIVITIY SCORE: 14
STANDING UP FROM CHAIR USING ARMS: A LOT
HELP NEEDED FOR BATHING: A LOT
MOBILITY SCORE: 10

## 2021-07-07 ASSESSMENT — ENCOUNTER SYMPTOMS
DIZZINESS: 0
PALPITATIONS: 0
ABDOMINAL PAIN: 0
FEVER: 0
COUGH: 0
NAUSEA: 0
MYALGIAS: 1
HEADACHES: 0
CHILLS: 0
ORTHOPNEA: 0
SHORTNESS OF BREATH: 0
BLURRED VISION: 0
HEARTBURN: 0
HEMOPTYSIS: 0
CLAUDICATION: 0
BACK PAIN: 1
NERVOUS/ANXIOUS: 1
VOMITING: 0
DEPRESSION: 1
SORE THROAT: 0

## 2021-07-07 ASSESSMENT — PAIN DESCRIPTION - PAIN TYPE
TYPE: ACUTE PAIN;SURGICAL PAIN
TYPE: ACUTE PAIN;SURGICAL PAIN
TYPE: ACUTE PAIN

## 2021-07-07 ASSESSMENT — ACTIVITIES OF DAILY LIVING (ADL): TOILETING: INDEPENDENT

## 2021-07-07 ASSESSMENT — GAIT ASSESSMENTS
DEVIATION: ANTALGIC;SHUFFLED GAIT;BRADYKINETIC
GAIT LEVEL OF ASSIST: MINIMAL ASSIST
ASSISTIVE DEVICE: FRONT WHEEL WALKER
DISTANCE (FEET): 10

## 2021-07-07 NOTE — PROGRESS NOTES
Neurosurgery Progress Note    Subjective:  States severe mid back pain, states some better  Denies symptoms into her legs    Exam:  BLE str intact- brace at bedside    BP  Min: 119/76  Max: 142/79  Pulse  Av.3  Min: 62  Max: 71  Resp  Av  Min: 15  Max: 18  Temp  Av.4 °C (97.6 °F)  Min: 36.3 °C (97.4 °F)  Max: 36.5 °C (97.7 °F)  SpO2  Av.8 %  Min: 93 %  Max: 96 %    No data recorded    Recent Labs     21  0440   WBC 6.5   RBC 3.80*   HEMOGLOBIN 10.3*   HEMATOCRIT 32.6*   MCV 85.8   MCH 27.1   MCHC 31.6*   RDW 48.9   PLATELETCT 152*   MPV 9.9     Recent Labs     21  0440   SODIUM 130*   POTASSIUM 4.1   CHLORIDE 98   CO2 24   GLUCOSE 108*   BUN 19   CREATININE 0.73   CALCIUM 8.8               Intake/Output                       21 - 21 0659 21 - 21 0659     [id number] [id number] Total 9427-6051 4171-4144 Total                 Intake    P.O.  240  -- 240  --  -- --    P.O. 240 -- 240 -- -- --    Total Intake 240 -- 240 -- -- --       Output    Urine  500  1200 1700  --  -- --    Output (mL) (Urethral Catheter 16 Fr.) 500 1200 1700 -- -- --    Stool  --  -- --  --  -- --    Number of Times Stooled -- -- -- 1 x -- 1 x    Total Output 500 1200 1700 -- -- --       Net I/O     -260 -1200 -1460 -- -- --            Intake/Output Summary (Last 24 hours) at 2021 1219  Last data filed at 2021 0301  Gross per 24 hour   Intake 120 ml   Output 1700 ml   Net -1580 ml            • traMADol  50 mg Q6HRS PRN   • atenolol  50 mg DAILY   • senna-docusate  2 tablet BID    And   • polyethylene glycol/lytes  1 Packet QDAY PRN    And   • magnesium hydroxide  30 mL QDAY PRN    And   • bisacodyl  10 mg QDAY PRN   • heparin  5,000 Units Q8HRS   • acetaminophen  650 mg Q6HRS PRN   • enalaprilat  1.25 mg Q6HRS PRN   • labetalol  10 mg Q4HRS PRN   • ondansetron  4 mg Q4HRS PRN   • ondansetron  4 mg Q4HRS PRN   • Pharmacy Consult Request  1 Each PHARMACY TO DOSE   • oxyCODONE  immediate-release  2.5 mg Q3HRS PRN    Or   • oxyCODONE immediate-release  5 mg Q3HRS PRN    Or   • HYDROmorphone  0.25 mg Q3HRS PRN   • NS   Continuous   • cyclobenzaprine  10 mg TID PRN   • diazePAM  5 mg Q6HRS PRN       Assessment and Plan:  Hospital day #3 T7 fracture  Prophylactic anticoagulation: no         Start date/time: tbd  Plan:  Continue pain control, will add muscle relaxers  TLSO  If able to stand, xrays standing in TLSO, PTOT  Discussed cervical collar with Dr Eduardo and hospitalist ok to dc collar

## 2021-07-07 NOTE — PROGRESS NOTES
Assumed care at 1900. Received bedside report from ANGELA Botello. Patient was asleep in bed. No signs of distress. Bed is in low and locked position. Non-slip socks in place. Call light is within reach. Bed alarm is on. Hourly rounding in place.

## 2021-07-07 NOTE — THERAPY
Physical Therapy   Initial Evaluation     Patient Name: Peg Louie  Age:  76 y.o., Sex:  female  Medical Record #: [id number]  Today's Date: 7/7/2021     Precautions: (P) Fall Risk, TLSO (Thoracolumbosacral orthosis)    Assessment  Patient is 76 y.o. female with a diagnosis of GLF with T7-8 fracture. TLSO when OOB. Pt lives [place] in [facility] with 8 stairs to enter but while recovering will be staying with daughter in [place] in [facility] with no MATT. Pt PLOF IND and working and driving in [place]. Pt presents with limitations in all mobility and pain/weakness/fatigue restricting with O2 desat with any/all exertion, requiring rest breaks and breathing cues. Pt requires MAX A bed mobility, MODA STS transfers with FWW, ALEXANDER gait with FWW 10 feet and mild instability throughout. Desats to 82% with gait and needs to sit and rebounds to 92% with <1 min cued breathing. Pain with all mobility and heavy cueing for relaxation and deep breathing. TLSO donned in bed initially. At risk for falls, further injury and functional decline. Would benefit from skilled PT to address and PT will progress POC as tolerated. Unsafe to return home at this time. Pending progress, plan for post acute placement.     Plan    Recommend Physical Therapy 4 times per week until therapy goals are met for the following treatments:  Bed Mobility, Community Re-integration, Gait Training, Neuro Re-Education / Balance, Stair Training, Therapeutic Activities and Therapeutic Exercises    DC Equipment Recommendations: Unable to determine at this time  Discharge Recommendations:  Recommend post-acute placement for additional physical therapy services prior to discharge home       Subjective  Pt pleasant and agreeable to PT eval and reports mod/severe pain with mobility. Daughter present throughout.      Objective  Pt requires MAX A bed mobility, MODA STS transfers with FWW, ALEXANDER gait with FWW 10 feet and mild instability throughout. Desats to 82% with gait and  needs to sit and rebounds to 92% with <1 min cued breathing. Pain with all mobility and heavy cueing for relaxation and deep breathing. TLSO donned in bed initially.      07/07/21 1421   Total Time Spent   Total Time Spent (Mins) 42   Charge Group   PT Evaluation PT Evaluation Mod   Initial Contact Note    Initial Contact Note Order Received and Verified, Physical Therapy Evaluation in Progress with Full Report to Follow.   Precautions   Precautions Fall Risk;TLSO (Thoracolumbosacral orthosis)   Comments log roll, TLSO when OOB   Pain 0 - 10 Group   Therapist Pain Assessment Post Activity;During Activity  (high levels of pain with mobility, not quantified)   Non Verbal Descriptors   Non Verbal Scale  Calm   Prior Living Situation   Prior Services Home-Independent   Housing / Facility 1 Story House  (daughters home in [place])   Steps Into Home 0   Equipment Owned None   Lives with - Patient's Self Care Capacity Alone and Able to Care For Self   Comments Will be staying with daugther prior to return to [place]   Prior Level of Functional Mobility   Bed Mobility Independent   Transfer Status Independent   Ambulation Independent   Distance Ambulation (Feet)   (community)   Assistive Devices Used None   Stairs Independent   Comments IND and working and driving in [place]   History of Falls   History of Falls Yes   Date of Last Fall   (reason for admit)   Cognition    Level of Consciousness Alert   Comments Pleasant and agreeable   Active ROM Lower Body    Comments R knee OA impaired flexion   Strength Lower Body   Comments R knee OA impaired strength, LLE WFL   Sensation Lower Body   Lower Extremity Sensation   WDL   Lower Body Muscle Tone   Lower Body Muscle Tone  WDL   Coordination Lower Body    Coordination Lower Body  WDL   Balance Assessment   Sitting Balance (Static) Fair -   Sitting Balance (Dynamic) Poor +   Standing Balance (Static) Poor +   Standing Balance (Dynamic) Poor   Weight Shift Sitting Poor   Weight Shift  Standing Poor   Comments FWW in standing   Gait Analysis   Gait Level Of Assist Minimal Assist   Assistive Device Front Wheel Walker   Distance (Feet) 10   # of Times Distance was Traveled 1   Deviation Antalgic;Shuffled Gait;Bradykinetic  (unstable, O2 desat 82% with gait, rebounds < 1 min)   # of Stairs Climbed 0   Comments no overt LOB, O2 desat needing seated rest, TLSO OOB   Bed Mobility    Supine to Sit Maximal Assist   Sit to Supine Maximal Assist   Scooting Moderate Assist   Rolling Maximal Assist to Rt.;Maximum Assist to Lt.   Comments Log roll   Functional Mobility   Sit to Stand Moderate Assist  (initial trial MODA, improved to ALEXANDER with each stand)   Transfer Method Stand Step   Mobility EOB, in room FWW   How much difficulty does the patient currently have...   Turning over in bed (including adjusting bedclothes, sheets and blankets)? 1   Sitting down on and standing up from a chair with arms (e.g., wheelchair, bedside commode, etc.) 1   Moving from lying on back to sitting on the side of the bed? 1   How much help from another person does the patient currently need...   Moving to and from a bed to a chair (including a wheelchair)? 2   Need to walk in a hospital room? 3   Climbing 3-5 steps with a railing? 2   6 clicks Mobility Score 10   Activity Tolerance   Sitting in Chair NT   Sitting Edge of Bed >25   Standing <5   Comments Fatigues quickly, desats quickly   Patient / Family Goals    Patient / Family Goal #1 Reduce pain   Short Term Goals    Short Term Goal # 1 Pt will demo bed mobility flat HOB with SPV in 6 visits to return home safely   Short Term Goal # 2 Pt will demo transfers with FWW and SPV in 6 visits to return home safely   Short Term Goal # 3 Pt will ambulate 150 feet with FWW and SPV in 6 visits to return home safely   Education Group   Education Provided Role of Physical Therapist;Brace Wear and Care;Spine Precautions   Spine Precautions Patient Response  Patient;Family;Acceptance;Explanation;Verbal Demonstration;Action Demonstration   Role of Physical Therapist Patient Response Patient;Acceptance;Family;Explanation;Verbal Demonstration   Brace Wear & Care Patient Response Patient;Acceptance;Explanation;Verbal Demonstration;Action Demonstration   Problem List    Problems Pain;Impaired Bed Mobility;Impaired Transfers;Impaired Ambulation;Functional Strength Deficit;Impaired Balance;Impaired Coordination;Decreased Activity Tolerance   Anticipated Discharge Equipment and Recommendations   DC Equipment Recommendations Unable to determine at this time   Discharge Recommendations Recommend post-acute placement for additional physical therapy services prior to discharge home   Interdisciplinary Plan of Care Collaboration   IDT Collaboration with  Nursing   Patient Position at End of Therapy In Bed;Call Light within Reach;Tray Table within Reach;Phone within Reach;Family / Friend in Room   Collaboration Comments RN Updated   Session Information   Date / Session Number  7/7-1(1/4, 7/13)

## 2021-07-07 NOTE — CARE PLAN
Problem: Pain - Standard  Goal: Alleviation of pain or a reduction in pain to the patient’s comfort goal  Outcome: Progressing     Problem: Fall Risk  Goal: Patient will remain free from falls  Outcome: Progressing   The patient is Watcher - Medium risk of patient condition declining or worsening    Shift Goals  Clinical Goals: Decreased pain  Patient Goals: Improved comfort and sleep    Progress made toward(s) clinical / shift goals:  Patient stated pain in her neck. Patient education on comfort measures. Repositioned patient, and medication given per MAR. Following medication administration patient was able to sleep.     Patient is not progressing towards the following goals:

## 2021-07-07 NOTE — THERAPY
Occupational Therapy   Initial Evaluation     Patient Name: Peg Louie  Age:  76 y.o., Sex:  female  Medical Record #: [id number]  Today's Date: 7/7/2021     Precautions  Precautions: Fall Risk, TLSO (Thoracolumbosacral orthosis)  Comments: TLSO when OOB and worn OOB >5 min (can don in supine for comfort); per chart c-collar not needed    Assessment  Patient is 76 y.o. female admitted after GLF resulting in T7 retropulsion fx and T8 compression fx (non op with TLSO). Pt desats with activity to low 80%s req v/cs and extra time to recover >90%. Spent time educating dtr on post acute care vs HH, as well as brace mgmt, and  role in d/c planning and choosing placement. Dtr requested post acute care in [place] area. Reports she will be able to assist pt PRN, and pt will stay with her after d/c. Currently limited by decreased functional mobility, activity tolerance, cognition, balance, adherence to precautions, and pain which are currently affecting pt's ability to complete ADLs/IADLs at baseline. Will continue to follow.     Plan    Recommend Occupational Therapy 4 times per week until therapy goals are met for the following treatments:  Adaptive Equipment, Neuro Re-Education / Balance, Self Care/Activities of Daily Living, Therapeutic Activities and Therapeutic Exercises.    DC Equipment Recommendations: Unable to determine at this time, Tub / Shower Seat, Grab Bar(s) in Tub / Shower  Discharge Recommendations: Recommend post-acute placement for additional occupational therapy services prior to discharge home      Objective     07/07/21 1339   Initial Contact Note    Initial Contact Note Order Received and Verified, Occupational Therapy Evaluation in Progress with Full Report to Follow.   Prior Living Situation   Prior Services Home-Independent   Housing / Facility 1 Story House   Steps Into Home 0   Bathroom Set up Bathtub / Shower Combination   Equipment Owned None   Lives with - Patient's Self Care  Capacity Alone and Able to Care For Self   Comments above info is for dtrs house in [place]; pt lives in [place], but reports will be staying with dtr after d/c   Prior Level of ADL Function   Self Feeding Independent   Grooming / Hygiene Independent   Bathing Independent   Dressing Independent   Toileting Independent   Prior Level of IADL Function   Medication Management Independent   Laundry Independent   Kitchen Mobility Independent   Finances Independent   Home Management Independent   Shopping Independent   Prior Level Of Mobility Independent Without Device in Home;Independent Without Device in Community   Driving / Transportation Driving Independent   Occupation (Pre-Hospital Vocational) Employed Full Time;Requires Sitting, Desk, Computer Tasks   History of Falls   History of Falls Yes   Date of Last Fall   (reason for admit)   Precautions   Precautions Fall Risk;Spinal / Back Precautions ;TLSO (Thoracolumbosacral orthosis)   Comments TLSO when OOB and worn OOB >5 min (can don in supine for comfort); per chart c-collar not needed   Vitals   O2 (LPM) 3   O2 Delivery Device Silicone Nasal Cannula   Vitals Comments desaturation to low 80%s with transitions and steps; req v/cs for breathing/pacing   Pain 0 - 10 Group   Location Back;Neck   Location Orientation Posterior   Therapist Pain Assessment Post Activity;During Activity;Nurse Notified  (not quantified)   Cognition    Cognition / Consciousness X   Level of Consciousness Alert   Attention Impaired   Comments pleasant and cooperative; receptivity limited by pain   Passive ROM Upper Body   Passive ROM Upper Body WDL   Active ROM Upper Body   Active ROM Upper Body  WDL   Strength Upper Body   Upper Body Strength  X   Comments functional for ADLs, but not tested d/t pain   Coordination Upper Body   Coordination WDL   Balance Assessment   Sitting Balance (Static) Fair -   Sitting Balance (Dynamic) Fair -  (with use of BUEs, improved to no UE)   Standing Balance (Static)  "Poor +   Standing Balance (Dynamic) Poor   Weight Shift Sitting Fair   Weight Shift Standing Poor   Comments with FWW; able to use BUEs for ADLs by end of session   Bed Mobility    Supine to Sit Maximal Assist   Sit to Supine Maximal Assist   Scooting Moderate Assist   Rolling Maximal Assist to Rt.;Maximum Assist to Lt.   Comments edu on log roll   ADL Assessment   Eating Supervision  (drinking water)   Grooming Supervision;Seated  (wipng face with cloth and oral care)   Bathing Maximal Assist  (use of washcloths for hilary area)   Upper Body Dressing Total Assist  (for TLSO in supine)   Lower Body Dressing Maximal Assist  (socks)   Toileting Maximal Assist  (for richards and pericare)   Comments educated pt that she should be completing her own eating/grooming as her arms are not impaired   How much help from another person does the patient currently need...   Putting on and taking off regular lower body clothing? 2   Bathing (including washing, rinsing, and drying)? 2   Toileting, which includes using a toilet, bedpan, or urinal? 2   Putting on and taking off regular upper body clothing? 2   Taking care of personal grooming such as brushing teeth? 3   Eating meals? 3   6 Clicks Daily Activity Score 14   Functional Mobility   Sit to Stand Moderate Assist  (x2 ppl, improved to min A after 2nd stand)   Bed, Chair, Wheelchair Transfer Moderate Assist   Toilet Transfers   (edu on use of BSC)   Transfer Method Stand Step   Mobility EOB and steps forward/back   Edema / Skin Assessment   Edema / Skin  Not Assessed   Activity Tolerance   Comments fatigues quickly in standing, desats with activity and transition. Reports feels the \"wind is knocked out of her\"   Patient / Family Goals   Patient / Family Goal #1 to get better   Short Term Goals   Short Term Goal # 1 LB dressing with min A   Short Term Goal # 2 TLSO don/doff with min A   Short Term Goal # 3 toilet txf with SPV   Short Term Goal # 4 toileting with SPV   Short Term " Goal # 5 standing g/h w/SPV   Education Group   Education Provided Role of Occupational Therapist;Transfers;Back Safety;Brace Wear and Care;Activities of Daily Living;Pathology of bedrest   Role of Occupational Therapist Patient Response Patient;Acceptance;Family;Explanation;Verbal Demonstration;Reinforcement Needed   Back Safety Patient Response Patient;Family;Acceptance;Explanation;Demonstration;Reinforcement Needed;Verbal Demonstration   Brace Wear & Care Patient Response Patient;Acceptance;Explanation;Demonstration;Action Demonstration;Reinforcement Needed;Family   Transfers Patient Response Patient;Family;Acceptance;Explanation;Verbal Demonstration;Reinforcement Needed   ADL Patient Response Patient;Family;Acceptance;Explanation;Reinforcement Needed;Action Demonstration;Demonstration   Pathology of Bedrest Patient Response Patient;Family;Acceptance;Explanation;Verbal Demonstration;Reinforcement Needed   Additional Comments Spent time educating dtr on post acute care vs HH, as well as brace, and  role in d/c planning. Dtr requested post acute care in [place] area.

## 2021-07-08 ENCOUNTER — APPOINTMENT (OUTPATIENT)
Dept: RADIOLOGY | Facility: MEDICAL CENTER | Age: 76
DRG: 552 | End: 2021-07-08
Attending: HOSPITALIST
Payer: MEDICARE

## 2021-07-08 PROBLEM — D69.6 THROMBOCYTOPENIA (HCC): Status: ACTIVE | Noted: 2021-07-08

## 2021-07-08 LAB
ALBUMIN SERPL BCP-MCNC: 3.8 G/DL (ref 3.2–4.9)
ANION GAP SERPL CALC-SCNC: 11 MMOL/L (ref 7–16)
BASOPHILS # BLD AUTO: 0.3 % (ref 0–1.8)
BASOPHILS # BLD: 0.02 K/UL (ref 0–0.12)
BUN SERPL-MCNC: 21 MG/DL (ref 8–22)
CALCIUM SERPL-MCNC: 8.8 MG/DL (ref 8.5–10.5)
CHLORIDE SERPL-SCNC: 99 MMOL/L (ref 96–112)
CO2 SERPL-SCNC: 25 MMOL/L (ref 20–33)
CREAT SERPL-MCNC: 0.71 MG/DL (ref 0.5–1.4)
EOSINOPHIL # BLD AUTO: 0.02 K/UL (ref 0–0.51)
EOSINOPHIL NFR BLD: 0.3 % (ref 0–6.9)
ERYTHROCYTE [DISTWIDTH] IN BLOOD BY AUTOMATED COUNT: 48 FL (ref 35.9–50)
GLUCOSE SERPL-MCNC: 90 MG/DL (ref 65–99)
HCT VFR BLD AUTO: 31.8 % (ref 37–47)
HGB BLD-MCNC: 9.9 G/DL (ref 12–16)
IMM GRANULOCYTES # BLD AUTO: 0.02 K/UL (ref 0–0.11)
IMM GRANULOCYTES NFR BLD AUTO: 0.3 % (ref 0–0.9)
LYMPHOCYTES # BLD AUTO: 1.07 K/UL (ref 1–4.8)
LYMPHOCYTES NFR BLD: 16.4 % (ref 22–41)
MCH RBC QN AUTO: 26.9 PG (ref 27–33)
MCHC RBC AUTO-ENTMCNC: 31.1 G/DL (ref 33.6–35)
MCV RBC AUTO: 86.4 FL (ref 81.4–97.8)
MONOCYTES # BLD AUTO: 0.44 K/UL (ref 0–0.85)
MONOCYTES NFR BLD AUTO: 6.8 % (ref 0–13.4)
NEUTROPHILS # BLD AUTO: 4.94 K/UL (ref 2–7.15)
NEUTROPHILS NFR BLD: 75.9 % (ref 44–72)
NRBC # BLD AUTO: 0 K/UL
NRBC BLD-RTO: 0 /100 WBC
PHOSPHATE SERPL-MCNC: 3.9 MG/DL (ref 2.5–4.5)
PLATELET # BLD AUTO: 140 K/UL (ref 164–446)
PMV BLD AUTO: 10.4 FL (ref 9–12.9)
POTASSIUM SERPL-SCNC: 4.3 MMOL/L (ref 3.6–5.5)
RBC # BLD AUTO: 3.68 M/UL (ref 4.2–5.4)
SODIUM SERPL-SCNC: 135 MMOL/L (ref 135–145)
TSH SERPL DL<=0.005 MIU/L-ACNC: 1.94 UIU/ML (ref 0.38–5.33)
VIT B12 SERPL-MCNC: 489 PG/ML (ref 211–911)
WBC # BLD AUTO: 6.5 K/UL (ref 4.8–10.8)

## 2021-07-08 PROCEDURE — 700111 HCHG RX REV CODE 636 W/ 250 OVERRIDE (IP): Performed by: STUDENT IN AN ORGANIZED HEALTH CARE EDUCATION/TRAINING PROGRAM

## 2021-07-08 PROCEDURE — 36415 COLL VENOUS BLD VENIPUNCTURE: CPT

## 2021-07-08 PROCEDURE — 97535 SELF CARE MNGMENT TRAINING: CPT

## 2021-07-08 PROCEDURE — A9270 NON-COVERED ITEM OR SERVICE: HCPCS | Performed by: HOSPITALIST

## 2021-07-08 PROCEDURE — A9270 NON-COVERED ITEM OR SERVICE: HCPCS | Performed by: STUDENT IN AN ORGANIZED HEALTH CARE EDUCATION/TRAINING PROGRAM

## 2021-07-08 PROCEDURE — 72100 X-RAY EXAM L-S SPINE 2/3 VWS: CPT

## 2021-07-08 PROCEDURE — 72040 X-RAY EXAM NECK SPINE 2-3 VW: CPT

## 2021-07-08 PROCEDURE — 700111 HCHG RX REV CODE 636 W/ 250 OVERRIDE (IP): Performed by: HOSPITALIST

## 2021-07-08 PROCEDURE — 99232 SBSQ HOSP IP/OBS MODERATE 35: CPT | Performed by: HOSPITALIST

## 2021-07-08 PROCEDURE — 770006 HCHG ROOM/CARE - MED/SURG/GYN SEMI*

## 2021-07-08 PROCEDURE — A9270 NON-COVERED ITEM OR SERVICE: HCPCS | Performed by: NURSE PRACTITIONER

## 2021-07-08 PROCEDURE — 700102 HCHG RX REV CODE 250 W/ 637 OVERRIDE(OP): Performed by: STUDENT IN AN ORGANIZED HEALTH CARE EDUCATION/TRAINING PROGRAM

## 2021-07-08 PROCEDURE — 72070 X-RAY EXAM THORAC SPINE 2VWS: CPT

## 2021-07-08 PROCEDURE — 82607 VITAMIN B-12: CPT

## 2021-07-08 PROCEDURE — 700102 HCHG RX REV CODE 250 W/ 637 OVERRIDE(OP): Performed by: HOSPITALIST

## 2021-07-08 PROCEDURE — 700102 HCHG RX REV CODE 250 W/ 637 OVERRIDE(OP): Performed by: NURSE PRACTITIONER

## 2021-07-08 PROCEDURE — 85025 COMPLETE CBC W/AUTO DIFF WBC: CPT

## 2021-07-08 PROCEDURE — 84443 ASSAY THYROID STIM HORMONE: CPT

## 2021-07-08 PROCEDURE — 80069 RENAL FUNCTION PANEL: CPT

## 2021-07-08 RX ADMIN — METHOCARBAMOL 750 MG: 750 TABLET ORAL at 16:54

## 2021-07-08 RX ADMIN — TRAMADOL HYDROCHLORIDE 50 MG: 50 TABLET, FILM COATED ORAL at 00:07

## 2021-07-08 RX ADMIN — Medication: at 05:14

## 2021-07-08 RX ADMIN — Medication: at 16:56

## 2021-07-08 RX ADMIN — METHOCARBAMOL 750 MG: 750 TABLET ORAL at 21:30

## 2021-07-08 RX ADMIN — DOCUSATE SODIUM 50 MG AND SENNOSIDES 8.6 MG 2 TABLET: 8.6; 5 TABLET, FILM COATED ORAL at 05:14

## 2021-07-08 RX ADMIN — DIAZEPAM 5 MG: 5 TABLET ORAL at 05:20

## 2021-07-08 RX ADMIN — ATENOLOL 50 MG: 50 TABLET ORAL at 05:14

## 2021-07-08 RX ADMIN — HEPARIN SODIUM 5000 UNITS: 5000 INJECTION, SOLUTION INTRAVENOUS; SUBCUTANEOUS at 05:14

## 2021-07-08 RX ADMIN — MAGNESIUM CITRATE 296 ML: 1.75 LIQUID ORAL at 09:16

## 2021-07-08 RX ADMIN — KETOROLAC TROMETHAMINE 30 MG: 30 INJECTION, SOLUTION INTRAMUSCULAR at 16:52

## 2021-07-08 RX ADMIN — HEPARIN SODIUM 5000 UNITS: 5000 INJECTION, SOLUTION INTRAVENOUS; SUBCUTANEOUS at 21:30

## 2021-07-08 RX ADMIN — TRAMADOL HYDROCHLORIDE 50 MG: 50 TABLET, FILM COATED ORAL at 21:30

## 2021-07-08 RX ADMIN — METHOCARBAMOL 750 MG: 750 TABLET ORAL at 12:15

## 2021-07-08 RX ADMIN — TRAMADOL HYDROCHLORIDE 50 MG: 50 TABLET, FILM COATED ORAL at 12:15

## 2021-07-08 RX ADMIN — DOCUSATE SODIUM 50 MG AND SENNOSIDES 8.6 MG 2 TABLET: 8.6; 5 TABLET, FILM COATED ORAL at 16:54

## 2021-07-08 RX ADMIN — KETOROLAC TROMETHAMINE 30 MG: 30 INJECTION, SOLUTION INTRAMUSCULAR at 05:19

## 2021-07-08 RX ADMIN — HEPARIN SODIUM 5000 UNITS: 5000 INJECTION, SOLUTION INTRAVENOUS; SUBCUTANEOUS at 13:54

## 2021-07-08 RX ADMIN — METHOCARBAMOL 750 MG: 750 TABLET ORAL at 08:06

## 2021-07-08 ASSESSMENT — PAIN DESCRIPTION - PAIN TYPE
TYPE: ACUTE PAIN

## 2021-07-08 ASSESSMENT — ENCOUNTER SYMPTOMS
FEVER: 0
ORTHOPNEA: 0
HEMOPTYSIS: 0
HEARTBURN: 0
COUGH: 0
BLURRED VISION: 0
ABDOMINAL PAIN: 0
CLAUDICATION: 0
SHORTNESS OF BREATH: 0
BACK PAIN: 1
DEPRESSION: 0
NERVOUS/ANXIOUS: 0
VOMITING: 0
WEIGHT LOSS: 0
HEADACHES: 0
NAUSEA: 0
DIZZINESS: 0
PALPITATIONS: 0
SORE THROAT: 0
NECK PAIN: 0
MYALGIAS: 1

## 2021-07-08 ASSESSMENT — COGNITIVE AND FUNCTIONAL STATUS - GENERAL
SUGGESTED CMS G CODE MODIFIER DAILY ACTIVITY: CK
DAILY ACTIVITIY SCORE: 14
TOILETING: A LOT
DRESSING REGULAR LOWER BODY CLOTHING: A LOT
DRESSING REGULAR UPPER BODY CLOTHING: A LOT
EATING MEALS: A LITTLE
PERSONAL GROOMING: A LITTLE
HELP NEEDED FOR BATHING: A LOT

## 2021-07-08 NOTE — CARE PLAN
The patient is Stable - Low risk of patient condition declining or worsening    Shift Goals  Clinical Goals: pain control  Patient Goals: improve comfort    Progress made toward(s) clinical / shift goals:  Taught pt 0-10 pain scale and  non pharmacological method of pain mgt, encouraged to verbalize when in pain. Administered PRN pain med as needed.       Hourly rounding.  Non-skid socks. Bed locked & in low position. Personal belongings and call light  within reach. .

## 2021-07-08 NOTE — CARE PLAN
The patient is Stable - Low risk of patient condition declining or worsening    Shift Goals  Clinical Goals: Pain Control  Patient Goals: Pain Control    Progress made toward(s) clinical / shift goals:    Problem: Pain - Standard  Goal: Alleviation of pain or a reduction in pain to the patient’s comfort goal  Outcome: Progressing   Available pain medications reviewed with patient. Pain managed by scheduled medications. Encouraged to call if pain is no longer managed.     Problem: Knowledge Deficit - Standard  Goal: Patient and family/care givers will demonstrate understanding of plan of care, disease process/condition, diagnostic tests and medications  Outcome: Progressing   Plan of care discussed with patient, verbalizes understanding. Encouraged to voice feelings or concerns.     Problem: Fall Risk  Goal: Patient will remain free from falls  Outcome: Progressing   Fall precautions in place. Patient rounded on hourly. Clutter-free environment maintained. Bed alarm on, bed locked and in lowest position. Call light in reach.

## 2021-07-08 NOTE — DISCHARGE PLANNING
Received Choice form at 1019  Agency/Facility Name: 1 [facility] 2. [facility]   Referral sent per Choice form @ 1012

## 2021-07-08 NOTE — THERAPY
Physical Therapy   Daily Treatment     Patient Name: Peg Louie  Age:  76 y.o., Sex:  female  Medical Record #: [id number]  Today's Date: 7/8/2021     Precautions: Fall Risk, Spinal / Back Precautions , TLSO (Thoracolumbosacral orthosis)    Assessment  PT provided education/review of spinal precautions, relaxation and mobility techniques as well as education/recommendations for discharge planning and post acute placement. Daughter and pt with questions regarding inpatient rehab vs SNF, efforts that pt can take during non therapy hours to progress her mobility and functional status, equipment recommendations, what to expect with injury and progression of mobility. PT answered all questions to patient and family satisfaction. Once again reviewed spinal precautions and brace wear/care. Encouraged pt up to chair for meals as tolerated to progress mobility and activity tolerance. PT will progress POC as tolerated. Pt would likely be good candidate for inpatient rehab as her activity tolerance has been improving with each day and her PLOF was IND and working and driving. Also has strong family support at home with sound discharge plan.     Plan    Continue current treatment plan.    DC Equipment Recommendations:  Unable to determine at this time  Discharge Recommendations:  Recommend post-acute placement for additional physical therapy services prior to discharge home      Subjective  Pt pleasant and agreeable to PT education/review. Daughter present throughout.      Objective       07/08/21 1022   Total Time Spent   Total Time Spent (Mins) 20   Charge Group   Charges  Yes   PT Self Care / Home Evaluation  1   Precautions   Precautions Fall Risk;TLSO (Thoracolumbosacral orthosis);Spinal / Back Precautions    Comments TLSO when OOB>5 mins, c collar not needed   Pain 0 - 10 Group   Therapist Pain Assessment Post Activity Pain Same as Prior to Activity  (mod/severe pain with activity)   Non Verbal Descriptors   Non  Verbal Scale  Calm   Cognition    Level of Consciousness Alert   Comments Pleasant and agreeable   Other Treatments   Other Treatments Provided Education on mobility/spinal precautions, post acute placement and home d/c planning   Patient / Family Goals    Patient / Family Goal #1 Reduce pain   Goal #1 Outcome Progressing as expected   Short Term Goals    Short Term Goal # 1 Pt will demo bed mobility flat HOB with SPV in 6 visits to return home safely   Goal Outcome # 1 goal not met   Short Term Goal # 2 Pt will demo transfers with FWW and SPV in 6 visits to return home safely   Goal Outcome # 2 Goal not met   Short Term Goal # 3 Pt will ambulate 150 feet with FWW and SPV in 6 visits to return home safely   Goal Outcome # 3 Goal not met   Education Group   Education Provided Role of Physical Therapist;Spine Precautions   Spine Precautions Patient Response Patient;Acceptance;Family;Explanation;Verbal Demonstration   Role of Physical Therapist Patient Response Patient;Acceptance;Explanation;Verbal Demonstration   Brace Wear & Care Patient Response Patient;Acceptance;Family;Explanation;Verbal Demonstration   Additional Comments Education on post acute placement and d/c planning   Anticipated Discharge Equipment and Recommendations   DC Equipment Recommendations Unable to determine at this time   Discharge Recommendations Recommend post-acute placement for additional physical therapy services prior to discharge home   Interdisciplinary Plan of Care Collaboration   IDT Collaboration with  Nursing;Family / Caregiver   Patient Position at End of Therapy In Bed;Call Light within Reach;Tray Table within Reach;Phone within Reach   Collaboration Comments RN updated   Session Information   Date / Session Number  7/8-2(2/4, 7/13)

## 2021-07-08 NOTE — THERAPY
Occupational Therapy  Daily Treatment     Patient Name: Peg Louie  Age:  76 y.o., Sex:  female  Medical Record #: [id number]  Today's Date: 7/8/2021     Precautions  Precautions: Fall Risk, Spinal / Back Precautions , TLSO (Thoracolumbosacral orthosis)  Comments: TLSO when OOB and worn OOB >5 min (can don in supine for comfort); per chart c-collar not needed    Assessment    Pt seen for OT session. Progressing with functional mobility and activity tolerance. Very motivated to participate. Req min A for seated ADLs and txf to transfer chair v/cs for breathing throughout pain. Reports completed toileting with BSC earlier today; tolerated well. Educated pt and dtr that rehab req 3hrs/day of therapy and pt must have tolerance to complete. Encouraged continued OOB activity and sitting in chair for all meals. Continues to be limited by decreased functional mobility, activity tolerance, strength, balance, adherence to precautions, and pain which are currently affecting pt's ability to complete ADLs/IADLs at baseline. Will continue to follow.     Plan    Continue current treatment plan.    DC Equipment Recommendations: Unable to determine at this time  Discharge Recommendations: Recommend post-acute placement for additional occupational therapy services prior to discharge home     Objective     07/08/21 1312   Precautions   Precautions Fall Risk;Spinal / Back Precautions ;TLSO (Thoracolumbosacral orthosis)   Comments TLSO when OOB and worn OOB >5 min (can don in supine for comfort); per chart c-collar not needed   Vitals   O2 (LPM) 3   O2 Delivery Device Oxymask   Pain 0 - 10 Group   Location Back   Therapist Pain Assessment During Activity;Post Activity;Nurse Notified  (not quantified)   Cognition    Cognition / Consciousness X   Level of Consciousness Alert   Attention Impaired  (distracted by pain)   Comments pleasant and cooperative; motivated   Passive ROM Upper Body   Passive ROM Upper Body WDL   Active ROM  Upper Body   Active ROM Upper Body  WDL   Strength Upper Body   Comments functional for ADLs, but not tested d/t pain   Balance   Sitting Balance (Static) Fair   Sitting Balance (Dynamic) Fair -   Standing Balance (Static) Fair -   Standing Balance (Dynamic) Fair -   Weight Shift Sitting Fair   Weight Shift Standing Fair   Skilled Intervention Verbal Cuing;Compensatory Strategies;Tactile Cuing;Facilitation   Comments w/ FWW   Bed Mobility    Scooting Minimal Assist   Skilled Intervention Verbal Cuing;Compensatory Strategies   Comments found at EOB and left in txf chair   Activities of Daily Living   Eating Supervision  (seated drinking)   Grooming Seated;Supervision  (oral care)   Upper Body Dressing Maximal Assist  (TLSO )   Toileting   (per dtr, pt sat on BSC/toileting w/ A for pericare/safety)   Skilled Intervention Verbal Cuing;Tactile Cuing;Facilitation;Compensatory Strategies   Comments limited by arrival of transport   How much help from another person does the patient currently need...   Putting on and taking off regular lower body clothing? 2   Bathing (including washing, rinsing, and drying)? 2   Toileting, which includes using a toilet, bedpan, or urinal? 2   Putting on and taking off regular upper body clothing? 2   Taking care of personal grooming such as brushing teeth? 3   Eating meals? 3   6 Clicks Daily Activity Score 14   Functional Mobility   Sit to Stand Minimal Assist  (x2)   Bed, Chair, Wheelchair Transfer Minimal Assist   Toilet Transfers   (reports min A to [facility] earlier today)   Transfer Method Stand Step   Mobility w/FWW EOB>txf chair   Skilled Intervention Verbal Cuing;Tactile Cuing;Compensatory Strategies;Facilitation   Activity Tolerance   Comments activity tolerance improving   Patient / Family Goals   Patient / Family Goal #1 to get better   Short Term Goals   Short Term Goal # 1 LB dressing with min A   Goal Outcome # 1 Progressing slower than expected   Short Term Goal # 2 TLSO  don/doff with min A   Goal Outcome # 2 Progressing as expected   Short Term Goal # 3 toilet txf with SPV   Goal Outcome # 3 Progressing as expected   Short Term Goal # 4 toileting with SPV   Goal Outcome # 4 Progressing as expected   Short Term Goal # 5 standing g/h w/SPV   Goal Outcome # 5 Progressing as expected

## 2021-07-08 NOTE — PROGRESS NOTES
Hospital Medicine Daily Progress Note    Date of Service  7/7/2021    Chief Complaint  Peg Louie is a 76 y.o. female admitted 7/6/2021 with   Chief Complaint   Patient presents with   • T-5000 FALL     brought in by Med flight transfer from [facility]. patient sustrained C 7 and T 8 unstable fractures s/p Fall from a van. denies loc. Neuro intact. arrived on [place] [facility] collar.         Hospital Course  This is 76-year-old female with a past medical history significant for obesity with Body mass index is 39.14 kg/m².  , arrhythmia, htn [facility].    She reports that he was trying to close the door while sitting on the side of the bed, she lost balance ; fell into the ground hitting her head and back.     CT of the thoracic spine: T8-Severe anterior wedge compression fracture with bursting pattern, greater than 50% height loss..  Posterior wall retropulsed 5 mm T7 spinous fracture at its tip.   CT head: No evidence of mass or bleed.  CT cervical spine, DJD without acute fractures or dislocations.  CT of the abdomen/pelvis: No abdominal injury found, large hiatal hernia.     Neurosurgery Dr. Eduardo was consulted who recommended no surgical intervention.  Recommended obtaining T SLO brace, PT/OT ; and pain management.     Interval Problem Update    No acute events overnight.  Upon my evaluation, patient noted to have intractable amount of back pain.  Plan: Continue oxycodone, gabapentin, tramadol,  toradol   --Discontinued consulted, plan is to remove C-collar   -- PT/OT has evaluated and resc post-acute , snf referral in place     Plan of care has been discussed with the patient     I have personally seen and examined the patient at bedside. I discussed the plan of care with patient, family, bedside RN and neurosurgery.    Consultants/Specialty  neurosurgery    Code Status  Full Code    Disposition  Patient is medically cleared.    Anticipate discharge to to skilled nursing facility.  I have placed the appropriate orders for post-discharge needs.    Review of Systems  Review of Systems   Constitutional: Negative for chills, fever and malaise/fatigue.   HENT: Negative for congestion and sore throat.    Eyes: Negative for blurred vision.   Respiratory: Negative for cough, hemoptysis and shortness of breath.    Cardiovascular: Negative for chest pain, palpitations, orthopnea and claudication.   Gastrointestinal: Negative for abdominal pain, heartburn, nausea and vomiting.   Genitourinary: Negative for dysuria.   Musculoskeletal: Positive for back pain and myalgias. Negative for joint pain.   Neurological: Negative for dizziness and headaches.   Psychiatric/Behavioral: Positive for depression. The patient is nervous/anxious.         Physical Exam  Temp:  [36.4 °C (97.5 °F)-36.5 °C (97.7 °F)] 36.5 °C (97.7 °F)  Pulse:  [63-71] 69  Resp:  [18] 18  BP: (114-142)/(68-79) 114/68  SpO2:  [93 %-96 %] 93 %    Physical Exam  Vitals and nursing note reviewed.   Constitutional:       Appearance: She is obese.   HENT:      Head: Normocephalic and atraumatic.   Eyes:      Extraocular Movements: Extraocular movements intact.   Cardiovascular:      Rate and Rhythm: Normal rate.   Pulmonary:      Effort: No respiratory distress.      Breath sounds: Normal breath sounds.      Comments: Decreased breath sound at the bases   Abdominal:      General: Bowel sounds are normal. There is no distension.      Palpations: Abdomen is soft.      Tenderness: There is no abdominal tenderness.   Musculoskeletal:      Cervical back: Neck supple.      Right lower leg: No edema.      Left lower leg: No edema.      Comments: Decreased ROM in the B/L LE 2/2 acute pain    Skin:     General: Skin is warm.   Neurological:      Mental Status: She is alert and oriented to person, place, and time.      Cranial Nerves: No cranial nerve deficit.      Motor: No weakness.          Fluids    Intake/Output Summary (Last 24 hours) at 7/7/2021 2132  Last data filed at 7/7/2021 0301  Gross per 24 hour   Intake --   Output 1200 ml   Net -1200 ml       Laboratory  Recent Labs     07/07/21  0440   WBC 6.5   RBC 3.80*   HEMOGLOBIN 10.3*   HEMATOCRIT 32.6*   MCV 85.8   MCH 27.1   MCHC 31.6*   RDW 48.9   PLATELETCT 152*   MPV 9.9     Recent Labs     07/07/21  0440   SODIUM 130*   POTASSIUM 4.1   CHLORIDE 98   CO2 24   GLUCOSE 108*   BUN 19   CREATININE 0.73   CALCIUM 8.8             Recent Labs     07/07/21  0440   TRIGLYCERIDE 142   HDL 58   *       Imaging  OUTSIDE IMAGES-CT CHEST   Final Result      OUTSIDE IMAGES-CT HEAD   Final Result      OUTSIDE IMAGES-CT CERVICAL SPINE   Final Result      OUTSIDE IMAGES-CT ABDOMEN /PELVIS   Final Result           Assessment/Plan  Spinal fracture of T7 vertebra (HCC)  Assessment & Plan  T8-Severe anterior wedge compression fracture with bursting pattern, greater than 50% height loss..  Posterior wall retropulsed 5 mm.  Spinal canal not significantly narrowed.  Right superior articular facet and right transverse process or fracture.  This fracture involves all 3 columns and is therefore unstable.  T7 spinous fracture at its tip.  Hemangioma T11 vertebral body.  -- neurosurgery has evaluated the patient, stated no surgical intervention    --Multimodal pain management, continue gabapentin, tramadol, oxycodone, and muscle relaxant.  PT/OT has evaluated, recommend postacute, SNF treatment in place.  Case has been discussed with patient, her daughter and Ns    Debility  Assessment & Plan  2/2 GLF, pt and Ot recs post-acute    Anemia  Assessment & Plan  Monitor H&H   If< 7 ; transfuse   Obtain iron/ vitamin b12 and tsh    Hyponatremia  Assessment & Plan  At 130, monitor    Obesity  Assessment & Plan  Body mass index is 39.14 kg/m².  Patient is examined general diet exercise and weight loss    HTN (hypertension)  Assessment & Plan  contineu atenolol, will  continue     Arrhythmia  Assessment & Plan  Was on atenolol at home; will continue     Spinal fracture of T8 vertebra (HCC)- (present on admission)  Assessment & Plan  -See above        VTE prophylaxis: heparin ppx    I have performed a physical exam and reviewed and updated ROS and Plan today (7/7/2021). In review of yesterday's note (7/6/2021), there are no changes except as documented above.

## 2021-07-08 NOTE — CARE PLAN
The patient is Stable - Low risk of patient condition declining or worsening    Shift Goals  Clinical Goals: Pain Control, must be able to turn self and use the incentive spirometer.   Patient Goals: Pain Control,  keep mouth moist and comfort.     Progress made toward(s) clinical / shift goals:  Patient was given ice chips to keep her mouth moist and was educated and encourage to use the incentive spirometer.

## 2021-07-08 NOTE — PROGRESS NOTES
Neurosurgery Progress Note    Subjective:  Sedated with medications  States better pain control and some mobility yesterday?  Denies leg radicular symptoms    Exam:    Sleepy but oriented  BLE strength grossly intact but limited due to pain    BP  Min: 114/68  Max: 131/64  Pulse  Av.8  Min: 61  Max: 72  Resp  Av.6  Min: 16  Max: 18  Temp  Av.4 °C (97.5 °F)  Min: 36.2 °C (97.2 °F)  Max: 36.7 °C (98 °F)  SpO2  Av.2 %  Min: 93 %  Max: 98 %    No data recorded    Recent Labs     21  04421  0730   WBC 6.5 6.5   RBC 3.80* 3.68*   HEMOGLOBIN 10.3* 9.9*   HEMATOCRIT 32.6* 31.8*   MCV 85.8 86.4   MCH 27.1 26.9*   MCHC 31.6* 31.1*   RDW 48.9 48.0   PLATELETCT 152* 140*   MPV 9.9 10.4     Recent Labs     21  0730   SODIUM 130* 135   POTASSIUM 4.1 4.3   CHLORIDE 98 99   CO2 24 25   GLUCOSE 108* 90   BUN 19 21   CREATININE 0.73 0.71   CALCIUM 8.8 8.8               Intake/Output                       21 - 21 - 2159      2106-7526 Total  8386-8032 Total                 Intake    Total Intake -- -- -- -- -- --       Output    Urine  --  -- --  2300  -- 2300    Output (mL) (Urethral Catheter 16 Fr.) -- -- -- 2300 -- 2300    Stool  --  -- --  --  -- --    Number of Times Stooled 1 x -- 1 x -- -- --    Total Output -- -- -- 2300 -- 2300       Net I/O     -- -- -- -2300 -- -2300            Intake/Output Summary (Last 24 hours) at 2021 0922  Last data filed at 2021 0815  Gross per 24 hour   Intake --   Output 2300 ml   Net -2300 ml            • traMADol  50 mg Q6HRS PRN   • bacitracin   BID   • magnesium citrate  296 mL Once   • methocarbamol  750 mg 4X/DAY   • ketorolac  30 mg Q6HRS PRN   • atenolol  50 mg DAILY   • senna-docusate  2 tablet BID    And   • polyethylene glycol/lytes  1 Packet QDAY PRN    And   • magnesium hydroxide  30 mL QDAY PRN    And   • bisacodyl  10 mg QDAY PRN   • heparin  5,000 Units Q8HRS    • acetaminophen  650 mg Q6HRS PRN   • enalaprilat  1.25 mg Q6HRS PRN   • labetalol  10 mg Q4HRS PRN   • ondansetron  4 mg Q4HRS PRN   • ondansetron  4 mg Q4HRS PRN   • Pharmacy Consult Request  1 Each PHARMACY TO DOSE   • diazePAM  5 mg Q6HRS PRN       Assessment and Plan:  Hospital day #4 T7 fracture  Prophylactic anticoagulation: no         Start date/time: tbd    Plan:  Continue pain control  TLSO to mobilize  If able to stand, xrays standing in TLSO,   PTOT

## 2021-07-08 NOTE — PROGRESS NOTES
[facility] Medicine Daily Progress Note    Date of Service  7/8/2021    Chief Complaint  Peg Louie is a 76 y.o. female admitted 7/6/2021 with   Chief Complaint   Patient presents with   • T-5000 FALL     brought in by Med flight transfer from [facility]. patient sustrained C 7 and T 8 unstable fractures s/p Fall from a van. denies loc. Neuro intact. arrived on [place] [facility] collar.         Hospital Course  This is 76-year-old female with a past medical history significant for obesity with Body mass index is 39.14 kg/m².  , arrhythmia, htn Presented to ER on 7/6/2021 [facility] facility after standard ground-level fall.    She reports that he was trying to close the door while sitting on the side of the bed, she lost balance ; fell into the ground hitting her head and back.     CT of the thoracic spine: T8-Severe anterior wedge compression fracture with bursting pattern, greater than 50% height loss..  Posterior wall retropulsed 5 mm T7 spinous fracture at its tip.   CT head: No evidence of mass or bleed.  CT cervical spine, DJD without acute fractures or dislocations.  CT of the abdomen/pelvis: No abdominal injury found, large hiatal hernia.     Neurosurgery Dr. Eduardo was consulted who recommended no surgical intervention.  Recommended obtaining T SLO brace, PT/OT ; and pain management.     Interval Problem Update    No acute events overnight.  Upon my evaluation, patient noted to have intractable amount of back pain.  Plan: Continue oxycodone, gabapentin, tramadol,  toradol   --Discontinued consulted, plan is to remove C-collar   -- PT/OT has evaluated and resc post-acute , snf referral in place     Plan of care has been discussed with the patient     I have personally seen and examined the patient at bedside. I discussed the plan of care with patient, family, bedside RN and neurosurgery.    7/8:  No acute events overnight, laying in bed continue any complaint.  Patient report that her back pain has gotten  significantly better.  Met with the patient daughter at bedside, x-ray of the C-spine T-spine and L-spine ordered, pending.  --Case management discussed with the patient and her daughter, referral placed to physiatry, SNF referral placed.  Plan of care has been discussed with the , bedside nurse, patient and her daughter.    Consultants/Specialty  neurosurgery    Code Status  Full Code    Disposition  Patient is medically cleared.   Anticipate discharge to to skilled nursing facility.  I have placed the appropriate orders for post-discharge needs.    Review of Systems  Review of Systems   Constitutional: Negative for fever and weight loss.   HENT: Negative for congestion and sore throat.    Eyes: Negative for blurred vision.   Respiratory: Negative for cough, hemoptysis and shortness of breath.    Cardiovascular: Negative for chest pain, palpitations, orthopnea and claudication.   Gastrointestinal: Negative for abdominal pain, heartburn, nausea and vomiting.   Genitourinary: Negative for dysuria and frequency.   Musculoskeletal: Positive for back pain and myalgias. Negative for joint pain and neck pain.   Neurological: Negative for dizziness and headaches.   Psychiatric/Behavioral: Negative for depression. The patient is not nervous/anxious.         Physical Exam  Temp:  [36.2 °C (97.2 °F)-36.7 °C (98 °F)] 36.2 °C (97.2 °F)  Pulse:  [61-72] 61  Resp:  [16-18] 18  BP: (114-131)/(64-70) 122/70  SpO2:  [93 %-98 %] 98 %    Physical Exam  Vitals and nursing note reviewed.   Constitutional:       Appearance: She is obese.   HENT:      Head: Normocephalic and atraumatic.   Eyes:      Extraocular Movements: Extraocular movements intact.      Pupils: Pupils are equal, round, and reactive to light.   Cardiovascular:      Rate and Rhythm: Normal rate.   Pulmonary:      Effort: No respiratory distress.      Breath sounds: Normal breath sounds.      Comments: Decreased breath sound at the bases   Abdominal:       General: Bowel sounds are normal. There is no distension.      Palpations: Abdomen is soft.      Tenderness: There is no abdominal tenderness.   Musculoskeletal:      Cervical back: Neck supple.      Right lower leg: No edema.      Left lower leg: No edema.      Comments: Decreased ROM in the B/L LE 2/2 acute pain    Skin:     General: Skin is warm.   Neurological:      Mental Status: She is alert and oriented to person, place, and time.      Cranial Nerves: No cranial nerve deficit.      Motor: No weakness.         Fluids    Intake/Output Summary (Last 24 hours) at 7/8/2021 1351  Last data filed at 7/8/2021 0815  Gross per 24 hour   Intake --   Output 2300 ml   Net -2300 ml       Laboratory  Recent Labs     07/07/21  0440 07/08/21  0730   WBC 6.5 6.5   RBC 3.80* 3.68*   HEMOGLOBIN 10.3* 9.9*   HEMATOCRIT 32.6* 31.8*   MCV 85.8 86.4   MCH 27.1 26.9*   MCHC 31.6* 31.1*   RDW 48.9 48.0   PLATELETCT 152* 140*   MPV 9.9 10.4     Recent Labs     07/07/21  0440 07/08/21  0730   SODIUM 130* 135   POTASSIUM 4.1 4.3   CHLORIDE 98 99   CO2 24 25   GLUCOSE 108* 90   BUN 19 21   CREATININE 0.73 0.71   CALCIUM 8.8 8.8             Recent Labs     07/07/21  0440   TRIGLYCERIDE 142   HDL 58   *       Imaging  OUTSIDE IMAGES-CT CHEST   Final Result      OUTSIDE IMAGES-CT HEAD   Final Result      OUTSIDE IMAGES-CT CERVICAL SPINE   Final Result      OUTSIDE IMAGES-CT ABDOMEN /PELVIS   Final Result      DX-CERVICAL SPINE-2 OR 3 VIEWS    (Results Pending)   DX-THORACIC SPINE-2 VIEWS    (Results Pending)   DX-LUMBAR SPINE-2 OR 3 VIEWS    (Results Pending)        Assessment/Plan  Spinal fracture of T7 vertebra ([facility])  Assessment & Plan  T8-Severe anterior wedge compression fracture with bursting pattern, greater than 50% height loss..  Posterior wall retropulsed 5 mm.  Spinal canal not significantly narrowed.  Right superior articular facet and right transverse process or fracture.  This fracture involves all 3 columns and is  therefore unstable.  T7 spinous fracture at its tip.  Hemangioma T11 vertebral body.  -- neurosurgery has evaluated the patient, stated no surgical intervention    --Multimodal pain management, continue gabapentin, tramadol, oxycodone, and muscle relaxant.  PT/OT has evaluated, recommend postacute, SNF treatment in place/physiatry referral in place  Case has been discussed with patient, her daughter and NS/case  Management     Patient stated that her pain has been better controlled  X-ray of the C-spine, T-spine, L-spine pending    Thrombocytopenia (HCC)  Assessment & Plan  At 152, not actively  Bleeding     Debility  Assessment & Plan  2/2 GLF, pt and Ot recs post-acute  Physiatry referral any skilled nursing facility referral has been placed, case management updated    Anemia  Assessment & Plan  Monitor H&H   If< 7 ; transfuse   Pending iron/ vitamin b12 and tsh    Hyponatremia  Assessment & Plan  Is old at 135, monitor    Obesity  Assessment & Plan  Body mass index is 39.14 kg/m².  Patient is examined general diet exercise and weight loss    HTN (hypertension)  Assessment & Plan  contineu atenolol, will continue     Arrhythmia  Assessment & Plan  Was on atenolol at home; will continue     Spinal fracture of T8 vertebra (HCC)- (present on admission)  Assessment & Plan  -See above        VTE prophylaxis: heparin ppx    I have performed a physical exam and reviewed and updated ROS and Plan today (7/8/2021). In review of yesterday's note (7/7/2021), there are no changes except as documented above.

## 2021-07-08 NOTE — ASSESSMENT & PLAN NOTE
2/2 GLF, pt and Ot recs post-acute  Physiatry referral any skilled nursing facility referral has been placed, case management updated

## 2021-07-08 NOTE — DISCHARGE PLANNING
Anticipated Discharge Disposition: IRF vs SNF    Action: LSW met with pt at bedside to discuss SNF. Pt reported that she lives in [place] and her dtr lives in [place]. The pt would like to go to SNF in [place] so that her dtr is able to visit her. LSW explained to the pt that the family would have to pay for the transportation to SNF if they choose to go to [place]. LSW also informed the pt that it may be difficult for her to be accepted to a SNF in [place] since she is currently in [place]. Pt requested additional time to speak with her dtr about [place] vs [place] [facility]. LSW left phone number and list of local SNFs with pt.    Addendum @1010  LSW met with pt's dtr Hayden and also spoke with pt's  Fei over the phone to discuss IRF and SNF. LSW explained IRF vs SNF and answered all of the families questions. Pt and family reported that they would like the pt to go to rehab, and have SNF as a backup plan. Hayden requested additional time to research SNFs prior to providing choice.    LSW sent Voalte message to MD Pham to request IRF referral be placed per families request. LSW faxed choice form for [facility] IRF and [facility] IRF to Lilli ORELLANA.    Addendum @1500  LSW met with Hayden who reported she has not looked over the SNFs yet. She stated she will do it tonight and call this LSW to provide choice after she looks it over.    Barriers to Discharge: IRF acceptance vs SNF choice.    Plan: Care coordination will follow up with pt for SNF choice.

## 2021-07-09 ENCOUNTER — APPOINTMENT (OUTPATIENT)
Dept: RADIOLOGY | Facility: MEDICAL CENTER | Age: 76
DRG: 552 | End: 2021-07-09
Attending: NURSE PRACTITIONER
Payer: MEDICARE

## 2021-07-09 ENCOUNTER — HOSPITAL ENCOUNTER (INPATIENT)
Facility: REHABILITATION | Age: 76
LOS: 8 days | DRG: 559 | End: 2021-07-17
Attending: PHYSICAL MEDICINE & REHABILITATION | Admitting: PHYSICAL MEDICINE & REHABILITATION
Payer: MEDICARE

## 2021-07-09 VITALS
HEIGHT: 66 IN | SYSTOLIC BLOOD PRESSURE: 123 MMHG | RESPIRATION RATE: 16 BRPM | OXYGEN SATURATION: 93 % | BODY MASS INDEX: 38.97 KG/M2 | HEART RATE: 65 BPM | WEIGHT: 242.51 LBS | DIASTOLIC BLOOD PRESSURE: 59 MMHG | TEMPERATURE: 97 F

## 2021-07-09 PROBLEM — E87.1 HYPONATREMIA: Status: RESOLVED | Noted: 2021-07-07 | Resolved: 2021-07-09

## 2021-07-09 PROBLEM — I49.9 ARRHYTHMIA: Status: RESOLVED | Noted: 2021-07-07 | Resolved: 2021-07-09

## 2021-07-09 PROCEDURE — 700102 HCHG RX REV CODE 250 W/ 637 OVERRIDE(OP): Performed by: STUDENT IN AN ORGANIZED HEALTH CARE EDUCATION/TRAINING PROGRAM

## 2021-07-09 PROCEDURE — 700111 HCHG RX REV CODE 636 W/ 250 OVERRIDE (IP): Performed by: PHYSICAL MEDICINE & REHABILITATION

## 2021-07-09 PROCEDURE — 700111 HCHG RX REV CODE 636 W/ 250 OVERRIDE (IP): Performed by: HOSPITALIST

## 2021-07-09 PROCEDURE — 700102 HCHG RX REV CODE 250 W/ 637 OVERRIDE(OP): Performed by: PHYSICAL MEDICINE & REHABILITATION

## 2021-07-09 PROCEDURE — U0003 INFECTIOUS AGENT DETECTION BY NUCLEIC ACID (DNA OR RNA); SEVERE ACUTE RESPIRATORY SYNDROME CORONAVIRUS 2 (SARS-COV-2) (CORONAVIRUS DISEASE [COVID-19]), AMPLIFIED PROBE TECHNIQUE, MAKING USE OF HIGH THROUGHPUT TECHNOLOGIES AS DESCRIBED BY CMS-2020-01-R: HCPCS

## 2021-07-09 PROCEDURE — 770010 HCHG ROOM/CARE - REHAB SEMI PRIVAT*

## 2021-07-09 PROCEDURE — 302146: Performed by: HOSPITALIST

## 2021-07-09 PROCEDURE — A9270 NON-COVERED ITEM OR SERVICE: HCPCS | Performed by: PHYSICAL MEDICINE & REHABILITATION

## 2021-07-09 PROCEDURE — 700111 HCHG RX REV CODE 636 W/ 250 OVERRIDE (IP): Performed by: STUDENT IN AN ORGANIZED HEALTH CARE EDUCATION/TRAINING PROGRAM

## 2021-07-09 PROCEDURE — 94760 N-INVAS EAR/PLS OXIMETRY 1: CPT

## 2021-07-09 PROCEDURE — 99223 1ST HOSP IP/OBS HIGH 75: CPT | Mod: AI | Performed by: PHYSICAL MEDICINE & REHABILITATION

## 2021-07-09 PROCEDURE — 700102 HCHG RX REV CODE 250 W/ 637 OVERRIDE(OP): Performed by: HOSPITALIST

## 2021-07-09 PROCEDURE — A9270 NON-COVERED ITEM OR SERVICE: HCPCS | Performed by: HOSPITALIST

## 2021-07-09 PROCEDURE — U0005 INFEC AGEN DETEC AMPLI PROBE: HCPCS

## 2021-07-09 PROCEDURE — 97535 SELF CARE MNGMENT TRAINING: CPT

## 2021-07-09 PROCEDURE — A9270 NON-COVERED ITEM OR SERVICE: HCPCS | Performed by: STUDENT IN AN ORGANIZED HEALTH CARE EDUCATION/TRAINING PROGRAM

## 2021-07-09 PROCEDURE — 99239 HOSP IP/OBS DSCHRG MGMT >30: CPT | Performed by: HOSPITALIST

## 2021-07-09 RX ORDER — BACITRACIN ZINC 500 [USP'U]/G
OINTMENT TOPICAL 2 TIMES DAILY
Status: CANCELLED | OUTPATIENT
Start: 2021-07-09 | End: 2021-07-12

## 2021-07-09 RX ORDER — BACITRACIN ZINC 500 [USP'U]/G
OINTMENT TOPICAL 2 TIMES DAILY
Status: COMPLETED | OUTPATIENT
Start: 2021-07-09 | End: 2021-07-12

## 2021-07-09 RX ORDER — ECHINACEA PURPUREA EXTRACT 125 MG
2 TABLET ORAL PRN
Status: DISCONTINUED | OUTPATIENT
Start: 2021-07-09 | End: 2021-07-20 | Stop reason: HOSPADM

## 2021-07-09 RX ORDER — ONDANSETRON 4 MG/1
4 TABLET, ORALLY DISINTEGRATING ORAL 4 TIMES DAILY PRN
Status: DISCONTINUED | OUTPATIENT
Start: 2021-07-09 | End: 2021-07-20 | Stop reason: HOSPADM

## 2021-07-09 RX ORDER — ACETAMINOPHEN 325 MG/1
650 TABLET ORAL EVERY 6 HOURS PRN
Status: CANCELLED | OUTPATIENT
Start: 2021-07-09

## 2021-07-09 RX ORDER — ONDANSETRON 2 MG/ML
4 INJECTION INTRAMUSCULAR; INTRAVENOUS 4 TIMES DAILY PRN
Status: DISCONTINUED | OUTPATIENT
Start: 2021-07-09 | End: 2021-07-20 | Stop reason: HOSPADM

## 2021-07-09 RX ORDER — POLYETHYLENE GLYCOL 3350 17 G/17G
1 POWDER, FOR SOLUTION ORAL
Status: DISCONTINUED | OUTPATIENT
Start: 2021-07-09 | End: 2021-07-20 | Stop reason: HOSPADM

## 2021-07-09 RX ORDER — METHOCARBAMOL 750 MG/1
750 TABLET, FILM COATED ORAL 4 TIMES DAILY
Qty: 120 TABLET | Status: ON HOLD
Start: 2021-07-09 | End: 2021-07-17

## 2021-07-09 RX ORDER — TRAMADOL HYDROCHLORIDE 50 MG/1
50 TABLET ORAL EVERY 6 HOURS PRN
Status: DISCONTINUED | OUTPATIENT
Start: 2021-07-09 | End: 2021-07-20 | Stop reason: HOSPADM

## 2021-07-09 RX ORDER — ATENOLOL 25 MG/1
50 TABLET ORAL DAILY
Status: DISCONTINUED | OUTPATIENT
Start: 2021-07-10 | End: 2021-07-10

## 2021-07-09 RX ORDER — BISACODYL 10 MG
10 SUPPOSITORY, RECTAL RECTAL
Status: DISCONTINUED | OUTPATIENT
Start: 2021-07-09 | End: 2021-07-20 | Stop reason: HOSPADM

## 2021-07-09 RX ORDER — HEPARIN SODIUM 5000 [USP'U]/ML
5000 INJECTION, SOLUTION INTRAVENOUS; SUBCUTANEOUS EVERY 8 HOURS
Status: CANCELLED | OUTPATIENT
Start: 2021-07-09

## 2021-07-09 RX ORDER — DIAZEPAM 5 MG/1
5 TABLET ORAL EVERY 6 HOURS PRN
Status: CANCELLED | OUTPATIENT
Start: 2021-07-09

## 2021-07-09 RX ORDER — METHOCARBAMOL 750 MG/1
750 TABLET, FILM COATED ORAL 4 TIMES DAILY
Status: DISCONTINUED | OUTPATIENT
Start: 2021-07-09 | End: 2021-07-13

## 2021-07-09 RX ORDER — DIAZEPAM 5 MG/1
5 TABLET ORAL EVERY 6 HOURS PRN
Status: DISCONTINUED | OUTPATIENT
Start: 2021-07-09 | End: 2021-07-20 | Stop reason: HOSPADM

## 2021-07-09 RX ORDER — POLYETHYLENE GLYCOL 3350 17 G/17G
17 POWDER, FOR SOLUTION ORAL
Refills: 3 | Status: SHIPPED
Start: 2021-07-09

## 2021-07-09 RX ORDER — POLYVINYL ALCOHOL 14 MG/ML
1 SOLUTION/ DROPS OPHTHALMIC PRN
Status: DISCONTINUED | OUTPATIENT
Start: 2021-07-09 | End: 2021-07-20 | Stop reason: HOSPADM

## 2021-07-09 RX ORDER — OXYCODONE HYDROCHLORIDE 10 MG/1
10 TABLET ORAL
Status: DISCONTINUED | OUTPATIENT
Start: 2021-07-09 | End: 2021-07-16

## 2021-07-09 RX ORDER — AMOXICILLIN 250 MG
2 CAPSULE ORAL 2 TIMES DAILY
Status: DISCONTINUED | OUTPATIENT
Start: 2021-07-09 | End: 2021-07-20 | Stop reason: HOSPADM

## 2021-07-09 RX ORDER — GABAPENTIN 100 MG/1
100 CAPSULE ORAL 2 TIMES DAILY
Qty: 90 CAPSULE | Status: ON HOLD
Start: 2021-07-09 | End: 2021-07-17

## 2021-07-09 RX ORDER — ATENOLOL 50 MG/1
50 TABLET ORAL DAILY
Status: CANCELLED | OUTPATIENT
Start: 2021-07-10

## 2021-07-09 RX ORDER — GABAPENTIN 100 MG/1
100 CAPSULE ORAL 2 TIMES DAILY
Status: DISCONTINUED | OUTPATIENT
Start: 2021-07-09 | End: 2021-07-09 | Stop reason: HOSPADM

## 2021-07-09 RX ORDER — HEPARIN SODIUM 5000 [USP'U]/ML
5000 INJECTION, SOLUTION INTRAVENOUS; SUBCUTANEOUS EVERY 8 HOURS
Status: DISCONTINUED | OUTPATIENT
Start: 2021-07-09 | End: 2021-07-13

## 2021-07-09 RX ORDER — TRAZODONE HYDROCHLORIDE 50 MG/1
50 TABLET ORAL
Status: DISCONTINUED | OUTPATIENT
Start: 2021-07-09 | End: 2021-07-20 | Stop reason: HOSPADM

## 2021-07-09 RX ORDER — ACETAMINOPHEN 325 MG/1
650 TABLET ORAL EVERY 4 HOURS PRN
Status: DISCONTINUED | OUTPATIENT
Start: 2021-07-09 | End: 2021-07-20 | Stop reason: HOSPADM

## 2021-07-09 RX ORDER — TRAMADOL HYDROCHLORIDE 50 MG/1
50 TABLET ORAL EVERY 6 HOURS PRN
Status: CANCELLED | OUTPATIENT
Start: 2021-07-09

## 2021-07-09 RX ORDER — ACETAMINOPHEN 325 MG/1
650 TABLET ORAL EVERY 6 HOURS PRN
Status: DISCONTINUED | OUTPATIENT
Start: 2021-07-09 | End: 2021-07-09

## 2021-07-09 RX ORDER — HYDRALAZINE HYDROCHLORIDE 25 MG/1
25 TABLET, FILM COATED ORAL EVERY 8 HOURS PRN
Status: DISCONTINUED | OUTPATIENT
Start: 2021-07-09 | End: 2021-07-20 | Stop reason: HOSPADM

## 2021-07-09 RX ORDER — GABAPENTIN 100 MG/1
100 CAPSULE ORAL 2 TIMES DAILY
Status: CANCELLED | OUTPATIENT
Start: 2021-07-09

## 2021-07-09 RX ORDER — GABAPENTIN 100 MG/1
100 CAPSULE ORAL 2 TIMES DAILY
Status: DISCONTINUED | OUTPATIENT
Start: 2021-07-09 | End: 2021-07-14

## 2021-07-09 RX ORDER — METHOCARBAMOL 750 MG/1
750 TABLET, FILM COATED ORAL 4 TIMES DAILY
Status: CANCELLED | OUTPATIENT
Start: 2021-07-09

## 2021-07-09 RX ORDER — OXYCODONE HYDROCHLORIDE 5 MG/1
5 TABLET ORAL
Status: DISCONTINUED | OUTPATIENT
Start: 2021-07-09 | End: 2021-07-16

## 2021-07-09 RX ORDER — ALUMINA, MAGNESIA, AND SIMETHICONE 2400; 2400; 240 MG/30ML; MG/30ML; MG/30ML
20 SUSPENSION ORAL
Status: DISCONTINUED | OUTPATIENT
Start: 2021-07-09 | End: 2021-07-20 | Stop reason: HOSPADM

## 2021-07-09 RX ADMIN — ATENOLOL 50 MG: 50 TABLET ORAL at 05:52

## 2021-07-09 RX ADMIN — SENNOSIDES AND DOCUSATE SODIUM 2 TABLET: 8.6; 5 TABLET ORAL at 21:38

## 2021-07-09 RX ADMIN — METHOCARBAMOL 750 MG: 750 TABLET ORAL at 10:33

## 2021-07-09 RX ADMIN — OXYCODONE 5 MG: 5 TABLET ORAL at 23:29

## 2021-07-09 RX ADMIN — HEPARIN SODIUM 5000 UNITS: 5000 INJECTION, SOLUTION INTRAVENOUS; SUBCUTANEOUS at 21:38

## 2021-07-09 RX ADMIN — HEPARIN SODIUM 5000 UNITS: 5000 INJECTION, SOLUTION INTRAVENOUS; SUBCUTANEOUS at 05:52

## 2021-07-09 RX ADMIN — GABAPENTIN 100 MG: 100 CAPSULE ORAL at 17:39

## 2021-07-09 RX ADMIN — TRAMADOL HYDROCHLORIDE 50 MG: 50 TABLET, FILM COATED ORAL at 03:23

## 2021-07-09 RX ADMIN — KETOROLAC TROMETHAMINE 30 MG: 30 INJECTION, SOLUTION INTRAMUSCULAR at 10:33

## 2021-07-09 RX ADMIN — BACITRACIN ZINC: 500 OINTMENT TOPICAL at 21:39

## 2021-07-09 RX ADMIN — GABAPENTIN 100 MG: 100 CAPSULE ORAL at 12:55

## 2021-07-09 RX ADMIN — HEPARIN SODIUM 5000 UNITS: 5000 INJECTION, SOLUTION INTRAVENOUS; SUBCUTANEOUS at 12:55

## 2021-07-09 RX ADMIN — TRAMADOL HYDROCHLORIDE 50 MG: 50 TABLET, FILM COATED ORAL at 12:55

## 2021-07-09 RX ADMIN — METHOCARBAMOL TABLETS 750 MG: 750 TABLET, COATED ORAL at 21:39

## 2021-07-09 RX ADMIN — METHOCARBAMOL TABLETS 750 MG: 750 TABLET, COATED ORAL at 17:39

## 2021-07-09 RX ADMIN — OXYCODONE 5 MG: 5 TABLET ORAL at 19:49

## 2021-07-09 ASSESSMENT — LIFESTYLE VARIABLES
EVER HAD A DRINK FIRST THING IN THE MORNING TO STEADY YOUR NERVES TO GET RID OF A HANGOVER: NO
HAVE PEOPLE ANNOYED YOU BY CRITICIZING YOUR DRINKING: NO
TOTAL SCORE: 0
EVER_SMOKED: YES
HAVE YOU EVER FELT YOU SHOULD CUT DOWN ON YOUR DRINKING: NO
TOTAL SCORE: 0
TOTAL SCORE: 0
HAVE PEOPLE ANNOYED YOU BY CRITICIZING YOUR DRINKING: NO
EVER FELT BAD OR GUILTY ABOUT YOUR DRINKING: NO
ALCOHOL_USE: YES
EVER FELT BAD OR GUILTY ABOUT YOUR DRINKING: NO
CONSUMPTION TOTAL: INCOMPLETE
HAVE YOU EVER FELT YOU SHOULD CUT DOWN ON YOUR DRINKING: NO
TOTAL SCORE: 0
TOTAL SCORE: 0
EVER HAD A DRINK FIRST THING IN THE MORNING TO STEADY YOUR NERVES TO GET RID OF A HANGOVER: NO
TOTAL SCORE: 0
ALCOHOL_USE: YES
CONSUMPTION TOTAL: INCOMPLETE

## 2021-07-09 ASSESSMENT — FIBROSIS 4 INDEX: FIB4 SCORE: 3.28

## 2021-07-09 ASSESSMENT — COGNITIVE AND FUNCTIONAL STATUS - GENERAL
EATING MEALS: A LITTLE
PERSONAL GROOMING: A LITTLE
TOILETING: A LOT
DAILY ACTIVITIY SCORE: 14
HELP NEEDED FOR BATHING: A LOT
SUGGESTED CMS G CODE MODIFIER DAILY ACTIVITY: CK
DRESSING REGULAR UPPER BODY CLOTHING: A LOT
DRESSING REGULAR LOWER BODY CLOTHING: A LOT

## 2021-07-09 ASSESSMENT — PAIN DESCRIPTION - PAIN TYPE
TYPE: ACUTE PAIN;SURGICAL PAIN
TYPE: ACUTE PAIN

## 2021-07-09 ASSESSMENT — PATIENT HEALTH QUESTIONNAIRE - PHQ9
2. FEELING DOWN, DEPRESSED, IRRITABLE, OR HOPELESS: NOT AT ALL
1. LITTLE INTEREST OR PLEASURE IN DOING THINGS: NOT AT ALL
SUM OF ALL RESPONSES TO PHQ9 QUESTIONS 1 AND 2: 0

## 2021-07-09 NOTE — DISCHARGE PLANNING
Anticipated Discharge Disposition: IRF vs SNF    Action: ANITAW met with pt at bedside along with MD Pham. LSW and dr Pham answered all of pt's and pt's families questions, including clarification about options for rehab as well as rehab acceptance processes.    Barriers to Discharge: IRF acceptance.    Plan: Care coordination will follow up with IRF referral.

## 2021-07-09 NOTE — DOCUMENTATION QUERY
[facility]                                                                       Query Response Note      PATIENT:               MATA GUZMAN  ACCT #:                  [id number]  MRN:                     [id number]  :                      1945  ADMIT DATE:       2021 1:54 AM  DISCH DATE:          RESPONDING  PROVIDER #:        [id number]           QUERY TEXT:    When both osteoporosis and a trauma occur with a resulting fracture, coding clinic directs the  to query the physician for clarification of the type of fracture. Based on clinical findings, risk factors and treatment, can this fracture be further specified as:    NOTE:  If an appropriate response is not listed below, please respond with a new note.      The patient's Clinical Indicators include:   OSH CT-Spine: T 8 wedge compression fx w/ bursting pattern. T 7 spinous process fx at tip   H&P: Spinal fracture of T 8 vertebra, spinal fx of T 7; hemangioma T 11   Neurosurgery Consult: Mid thoracic compression fx T 7 d/t ground level fall, osteoporotic fx  Treatment: Neurosurgery consult; pain management; PT/OT  Risk Factors: Ground level fall; osteoporotic fx    Thank You,  Nidia Maguire RN  Clinical    Connect via [employer]  Options provided:   -- Pathological fracture secondary to osteoporosis   -- Traumatic fracture   -- Unable to determine      Query created by: Nidia Maguire on 2021 1:21 PM    RESPONSE TEXT:    Traumatic fracture          Electronically signed by:  LYN PLAZA MD 2021 8:33 PM

## 2021-07-09 NOTE — PREADMISSION SCREENING NOTE
"  Pre-Admission Screening Form    Patient Information:   Name: Peg Louie     MRN: [id number]       : 1945      Age: 76 y.o.   Gender: female      Race: White [7]       Marital Status:  [4]  Family Contact: Hayden Louie David        Relationship: Daughter [2]  Other [10]  Home Phone:              Cell Phone: [phone]  Advanced Directives: None  Code Status:  FULL  Current Attending Provider: Alvaro Pham M.D.  Referring Physician: Dr. Pham  Physiatrist Consult: Dr. Penn   Referral Date: 21  Primary Payor Source:  MEDICARE  Secondary Payor Source:  [facility]    Medical Information:   Date of Admission to Acute Care Sett[place]2021  Room Number: T314/02  Rehabilitation Diagnosis: 0008.9 - Orthopaedic Disorders: Other Orthopaedic    There is no immunization history on file for this patient.  Allergies   Allergen Reactions   • Gluten Meal      Past Medical History:   Diagnosis Date   • Irregular heart beat      Past Surgical History:   Procedure Laterality Date   • CATARACT EXTRACTION WITH IOL      both eyes   • OTHER ORTHOPEDIC SURGERY      left ankle and bilateral hand surgeries   • PRIMARY C SECTION      x 1       History Leading to Admission, Conditions that Caused the Need for Rehab (CMS):     Dr. Reed H&P:  Chief Complaint   Patient presents with   • T-5000 FALL       brought in by Med flight transfer from [place] campus. patient sustrained C 7 and T 8 unstable fractures s/p Fall from a van. denies loc. Neuro intact. arrived on [facility] collar.         History of Presenting Illness  Patient transferred here for a T7, T8 fracture from [facility] which is unstable. There is no additional information obtained in my interview that is not in the ED note by Dr. Ramey quoted below which includes labs and summary from [facility]:     \"This is a 76-year-old female who was evaluated at the outside facility after a fall.  She fell out of a " "camper van onto her back.  She had severe thoracic back pain.  She denies any headache, nausea, vomiting, numbness, focal weakness.  She was ambulatory for a few steps following the incident.  She does not take anticoagulation or antiplatelet agents.     The patient underwent CT of the chest abdomen pelvis, head, cervical and thoracic spine.  CT of the thoracic spine: T8-Severe anterior wedge compression fracture with bursting pattern, greater than 50% height loss..  Posterior wall retropulsed 5 mm.  Spinal canal not significantly narrowed.  Right superior articular facet and right transverse process or fracture.  This fracture involves all 3 columns and is therefore unstable.  T7 spinous fracture at its tip.  Hemangioma T11 vertebral body.      CT head: No evidence of mass or bleed.  CT cervical spine, DJD without acute fractures or dislocations.  CT of the abdomen/pelvis: No abdominal injury found, large hiatal hernia.  Labs from the sending facility: Sodium 140, potassium 4.6, chloride 104, CO2 23, glucose 126, BUN 25, creatinine 1.19, calcium 9.4, LFTs WNL, WBC 11.5, Hgb 12.1 HCT 37, platelets 198, differential WNL.  INR 0.94.  UA WNL.\"     Will admit patient for eval by Neurosurgery with intervention vs. Brace fitting, pain management, and obs overnight as [place] is long distance to travel back.   Assessment/Plan:  I anticipate this patient is appropriate for observation status at this time.     Spinal fracture of T8 vertebra (HCC)- (present on admission)  Assessment & Plan  - CT imaging as summarized above.  T8-Severe anterior wedge compression fracture with bursting pattern, greater than 50% height loss..  Posterior wall retropulsed 5 mm.  Spinal canal not significantly narrowed.  Right superior articular facet and right transverse process or fracture.  This fracture involves all 3 columns and is therefore unstable.  T7 spinous fracture at its tip.  Hemangioma T11 vertebral body.  >Pain Control  >Eval by " Neurosurg to be performed by Dr. Eduardo after he gets out of surgery per ED physician who consulted him.  Consider brace     Spinal fracture of T7 vertebra (HCC)  Assessment & Plan  T8-Severe anterior wedge compression fracture with bursting pattern, greater than 50% height loss..  Posterior wall retropulsed 5 mm.  Spinal canal not significantly narrowed.  Right superior articular facet and right transverse process or fracture.  This fracture involves all 3 columns and is therefore unstable.  T7 spinous fracture at its tip.  Hemangioma T11 vertebral body.  >Pain Control  >Eval by Neurosurg to be performed by Dr. Eduardo after he gets out of surgery per ED physician who consulted him.  Consider brace  - CT imaging as summarized above.      Dr. Eudardo (Surgery Neurosurgery) recommendations:  ASSESSMENT AND PLAN:  At this time, it would be reasonable to place the   patient in a TLSO brace and then have her follow up in 6 weeks with standing   films in clinic for clearance.  She can have standing films tomorrow in the   brace prior to discharge for documentation.  It would be reasonable to get   physical therapy and occupational therapy on the patient prior to discharge to   ensure that she is mobilizing well in the brace and tolerating the pain from   the fracture. At this time, there is no neurosurgical intervention.  She   should have a trial of conservative treatment prior to moving forward with any   sort of intervention such as kyphoplasty or other interventions.     NILESH Powell   Nurse Practitioner   Surgery Neurosurgery   Progress Notes       Cosign Needed Addendum   Date of Service:  2021  9:44 AM               Cosign Needed Addendum             []Hide copied text    []Hover for details  Neurosurgery Progress Note     Subjective:  Pt seen by Dr. Eduardo     Exam:  A/Ox4  BLE strength grossly intact but limited due to pain     BP  Min: 117/62  Max: 131/78  Pulse  Av.3  Min: 63  Max: 82  Resp  Av.8   Min: 16  Max: 18  Temp  Av.7 °C (98 °F)  Min: 36.1 °C (97 °F)  Max: 37 °C (98.6 °F)  SpO2  Av.8 %  Min: 90 %  Max: 92 %     No data recorded          Recent Labs     21  0730   WBC 6.5 6.5   RBC 3.80* 3.68*   HEMOGLOBIN 10.3* 9.9*   HEMATOCRIT 32.6* 31.8*   MCV 85.8 86.4   MCH 27.1 26.9*   MCHC 31.6* 31.1*   RDW 48.9 48.0   PLATELETCT 152* 140*   MPV 9.9 10.4           Recent Labs     21  07   SODIUM 130* 135   POTASSIUM 4.1 4.3   CHLORIDE 98 99   CO2 24 25   GLUCOSE 108* 90   BUN 19 21   CREATININE 0.73 0.71   CALCIUM 8.8 8.8                            Intake/Output                          21 - 21 - 07/10/21 0659        3683-8827 Total 190059 Total                                  Intake     P.O.  --  200 200  --  -- --     P.O. -- 200 200 -- -- --     Total Intake -- 200 200 -- -- --                Output     Urine  2700  -- 2700  --  -- --     Output (mL) (Urethral Catheter 16 Fr.) 2700 -- 2700 -- -- --     Emesis  --  0 0  --  -- --     Emesis -- 0 0 -- -- --     Stool  --  -- --  --  -- --     Number of Times Stooled 5 x 1 x 6 x -- -- --     Total Output 2700 0 2700 -- -- --                Net I/O       -2700 200 -2500 -- -- --                Intake/Output Summary (Last 24 hours) at 2021 0944  Last data filed at 2021 2120      Gross per 24 hour   Intake 200 ml   Output 400 ml   Net -200 ml            • traMADol  50 mg Q6HRS PRN   • bacitracin   BID   • methocarbamol  750 mg 4X/DAY   • ketorolac  30 mg Q6HRS PRN   • atenolol  50 mg DAILY   • senna-docusate  2 tablet BID     And   • polyethylene glycol/lytes  1 Packet QDAY PRN     And   • magnesium hydroxide  30 mL QDAY PRN     And   • bisacodyl  10 mg QDAY PRN   • heparin  5,000 Units Q8HRS   • acetaminophen  650 mg Q6HRS PRN   • enalaprilat  1.25 mg Q6HRS PRN   • labetalol  10 mg Q4HRS PRN   • ondansetron  4 mg Q4HRS PRN   • ondansetron  4 mg  Q4HRS PRN   • Pharmacy Consult Request  1 Each PHARMACY TO DOSE   • diazePAM  5 mg Q6HRS PRN         Assessment and Plan:  Hospital day #5 T7 fracture  Prophylactic anticoagulation: no         Start date/time: tbd     Plan:  Continue pain control  TLSO to mobilize:  If able to stand, xrays standing in TLSO reviewed by Dr. Eduardo, continue conservative management   PTOT  Working on placement              Alvaro Pham M.D.   Physician   [facility] Medicine   Progress Notes       Signed   Date of Service:  7/8/2021  1:51 PM                    []Hide copied text    []Naeem for details  Hospital Medicine Daily Progress Note     Date of Service  7/8/2021     Chief Complaint  Peg Louie is a 76 y.o. female admitted 7/6/2021 with        Chief Complaint   Patient presents with   • T-5000 FALL       brought in by Med flight transfer from [facility]. patient sustrained C 7 and T 8 unstable fractures s/p Fall from a van. denies loc. Neuro intact. arrived on [employer] collar.            Hospital Course  This is 76-year-old female with a past medical history significant for obesity with Body mass index is 39.14 kg/m².  , arrhythmia, htn [facility]level [facility].    She reports that he was trying to close the door while sitting on the side of the bed, she lost balance ; fell into the ground hitting her head and back.     CT of the thoracic spine: T8-Severe anterior wedge compression fracture with bursting pattern, greater than 50% height loss..  Posterior wall retropulsed 5 mm T7 spinous fracture at its tip.   CT head: No evidence of mass or bleed.  CT cervical spine, DJD without acute fractures or dislocations.  CT of the abdomen/pelvis: No abdominal injury found, large hiatal hernia.     Neurosurgery Dr. Eduardo was consulted who recommended no surgical intervention.  Recommended obtaining T SLO brace, PT/OT ; and pain management.      Interval Problem  Update     No acute events overnight.  Upon my evaluation, patient noted to have intractable amount of back pain.  Plan: Continue oxycodone, gabapentin, tramadol,  toradol              --Discontinued consulted, plan is to remove C-collar              -- PT/OT has evaluated and resc post-acute , snf referral in place      Plan of care has been discussed with the patient      I have personally seen and examined the patient at bedside. I discussed the plan of care with patient, family, bedside RN and neurosurgery.     7/8:  No acute events overnight, laying in bed continue any complaint.  Patient report that her back pain has gotten significantly better.  Met with the patient daughter at bedside, x-ray of the C-spine T-spine and L-spine ordered, pending.  --Case management discussed with the patient and her daughter, referral placed to physiatry, SNF referral placed.  Plan of care has been discussed with the , bedside nurse, patient and her daughter.     Consultants/Specialty  neurosurgery     Code Status  Full Code     Disposition  Patient is medically cleared.   Anticipate discharge to to skilled nursing facility.  I have placed the appropriate orders for post-discharge needs.     Review of Systems  Review of Systems   Constitutional: Negative for fever and weight loss.   HENT: Negative for congestion and sore throat.    Eyes: Negative for blurred vision.   Respiratory: Negative for cough, hemoptysis and shortness of breath.    Cardiovascular: Negative for chest pain, palpitations, orthopnea and claudication.   Gastrointestinal: Negative for abdominal pain, heartburn, nausea and vomiting.   Genitourinary: Negative for dysuria and frequency.   Musculoskeletal: Positive for back pain and myalgias. Negative for joint pain and neck pain.   Neurological: Negative for dizziness and headaches.   Psychiatric/Behavioral: Negative for depression. The patient is not nervous/anxious.          Physical Exam  Temp:   [36.2 °C (97.2 °F)-36.7 °C (98 °F)] 36.2 °C (97.2 °F)  Pulse:  [61-72] 61  Resp:  [16-18] 18  BP: (114-131)/(64-70) 122/70  SpO2:  [93 %-98 %] 98 %     Physical Exam  Vitals and nursing note reviewed.   Constitutional:       Appearance: She is obese.   HENT:      Head: Normocephalic and atraumatic.   Eyes:      Extraocular Movements: Extraocular movements intact.      Pupils: Pupils are equal, round, and reactive to light.   Cardiovascular:      Rate and Rhythm: Normal rate.   Pulmonary:      Effort: No respiratory distress.      Breath sounds: Normal breath sounds.      Comments: Decreased breath sound at the bases   Abdominal:      General: Bowel sounds are normal. There is no distension.      Palpations: Abdomen is soft.      Tenderness: There is no abdominal tenderness.   Musculoskeletal:      Cervical back: Neck supple.      Right lower leg: No edema.      Left lower leg: No edema.      Comments: Decreased ROM in the B/L LE 2/2 acute pain    Skin:     General: Skin is warm.   Neurological:      Mental Status: She is alert and oriented to person, place, and time.      Cranial Nerves: No cranial nerve deficit.      Motor: No weakness.          Fluids     Intake/Output Summary (Last 24 hours) at 7/8/2021 1351  Last data filed at 7/8/2021 0815      Gross per 24 hour   Intake --   Output 2300 ml   Net -2300 ml         Laboratory       Recent Labs     07/07/21  0440 07/08/21  0730   WBC 6.5 6.5   RBC 3.80* 3.68*   HEMOGLOBIN 10.3* 9.9*   HEMATOCRIT 32.6* 31.8*   MCV 85.8 86.4   MCH 27.1 26.9*   MCHC 31.6* 31.1*   RDW 48.9 48.0   PLATELETCT 152* 140*   MPV 9.9 10.4           Recent Labs     07/07/21  0440 07/08/21  0730   SODIUM 130* 135   POTASSIUM 4.1 4.3   CHLORIDE 98 99   CO2 24 25   GLUCOSE 108* 90   BUN 19 21   CREATININE 0.73 0.71   CALCIUM 8.8 8.8                  Recent Labs     07/07/21  0440   TRIGLYCERIDE 142   HDL 58   *         Imaging  OUTSIDE IMAGES-CT CHEST   Final Result       OUTSIDE  IMAGES-CT HEAD   Final Result       OUTSIDE IMAGES-CT CERVICAL SPINE   Final Result       OUTSIDE IMAGES-CT ABDOMEN /PELVIS   Final Result       DX-CERVICAL SPINE-2 OR 3 VIEWS    (Results Pending)   DX-THORACIC SPINE-2 VIEWS    (Results Pending)   DX-LUMBAR SPINE-2 OR 3 VIEWS    (Results Pending)         Assessment/Plan  Spinal fracture of T7 vertebra (HCC)  Assessment & Plan  T8-Severe anterior wedge compression fracture with bursting pattern, greater than 50% height loss..  Posterior wall retropulsed 5 mm.  Spinal canal not significantly narrowed.  Right superior articular facet and right transverse process or fracture.  This fracture involves all 3 columns and is therefore unstable.  T7 spinous fracture at its tip.  Hemangioma T11 vertebral body.  -- neurosurgery has evaluated the patient, stated no surgical intervention           --Multimodal pain management, continue gabapentin, tramadol, oxycodone, and muscle relaxant.  PT/OT has evaluated, recommend postacute, SNF treatment in place/physiatry referral in place  Case has been discussed with patient, her daughter and NS/case  Management      Patient stated that her pain has been better controlled  X-ray of the C-spine, T-spine, L-spine pending     Thrombocytopenia (HCC)  Assessment & Plan  At 152, not actively  Bleeding      Debility  Assessment & Plan  2/2 GLF, pt and Ot recs post-acute  Physiatry referral any skilled nursing facility referral has been placed, case management updated     Anemia  Assessment & Plan  Monitor H&H   If< 7 ; transfuse   Pending iron/ vitamin b12 and tsh     Hyponatremia  Assessment & Plan  Is old at 135, monitor     Obesity  Assessment & Plan  Body mass index is 39.14 kg/m².  Patient is examined general diet exercise and weight loss     HTN (hypertension)  Assessment & Plan  contineu atenolol, will continue      Arrhythmia  Assessment & Plan  Was on atenolol at home; will continue      Spinal fracture of T8 vertebra (HCC)- (present  "on admission)  Assessment & Plan  -See above         VTE prophylaxis: heparin ppx     I have performed a physical exam and reviewed and updated ROS and Plan today (7/8/2021). In review of yesterday's note (7/7/2021), there are no changes except as documented above.        T-Spine Imaging 07-08-21:  1.  Severe compression of T8 vertebral body as seen previously, with associated kyphosis.  2.  Probable posterior 8th rib fracture, side is difficult to determine.  3.  Moderate multilevel degenerative change.      L-Spine Imaging 07-08-21:  1.  No lumbar spine fracture or subluxation.  2.  T8 compression fracture as seen on outside CT.  3.  Moderate multilevel degenerative change of lumbar spine with dextroconvex curvature.    Co-morbidities: See PMH  Potential Risk - Complications: Contractures, Deep Vein Thrombosis, Incontinence, Malnutrition, Pain, Pneumonia, Pressure Ulcer and Urinary Tract Infection  Level of Risk: High    Ongoing Medical Management Needed (Medical/Nursing Needs):   Patient Active Problem List    Diagnosis Date Noted   • Thrombocytopenia (HCC) 07/08/2021   • Arrhythmia 07/07/2021   • HTN (hypertension) 07/07/2021   • Obesity 07/07/2021   • Hyponatremia 07/07/2021   • Anemia 07/07/2021   • Debility 07/07/2021   • Spinal fracture of T7 vertebra (HCC) 07/06/2021   • Spinal fracture of T8 vertebra (HCC) 07/06/2021     A & O    Current Vital Signs:   Temperature: 36.1 °C (97 °F) Pulse: 65 Respiration: 16 Blood Pressure : 123/59  Weight: 110 kg (242 lb 8.1 oz) Height: 167.6 cm (5' 6\")  Pulse Oximetry: 93 % O2 (LPM): 2      Completed Laboratory Reports:  Recent Labs     07/07/21  0440 07/08/21  0730   WBC 6.5 6.5   HEMOGLOBIN 10.3* 9.9*   HEMATOCRIT 32.6* 31.8*   PLATELETCT 152* 140*   SODIUM 130* 135   POTASSIUM 4.1 4.3   BUN 19 21   CREATININE 0.73 0.71   ALBUMIN 3.9 3.8   GLUCOSE 108* 90     Additional Labs: Not Applicable    Prior Living Situation:   Housing / Facility: 1 Story House (daughters home in " [place])  Steps Into Home: 0  Lives with - Patient's Self Care Capacity: Alone and Able to Care For Self  Equipment Owned: None    Prior Level of Function / Living Situation:   Physical Therapy: Prior Services: Home-Independent  Housing / Facility: 1 Story House (daughters home in [place])  Steps Into Home: 0  Bathroom Set up: Bathtub / Shower Combination  Equipment Owned: None  Lives with - Patient's Self Care Capacity: Alone and Able to Care For Self  Bed Mobility: Independent  Transfer Status: Independent  Ambulation: Independent  Distance Ambulation (Feet):  (community)  Assistive Devices Used: None  Stairs: Independent  Current Level of Function:   Gait Level Of Assist: Minimal Assist  Assistive Device: Front Wheel Walker  Distance (Feet): 10  Deviation: Antalgic, Shuffled Gait, Bradykinetic (unstable, O2 desat 82% with gait, rebounds < 1 min)  # of Stairs Climbed: 0  Supine to Sit: Maximal Assist  Sit to Supine: Maximal Assist  Scooting: Minimal Assist  Rolling: Maximal Assist to Rt., Maximum Assist to Lt.  Skilled Intervention: Verbal Cuing, Compensatory Strategies  Comments: found at EOB and left in txf chair  Sit to Stand: Minimal Assist (x2)  Bed, Chair, Wheelchair Transfer: Minimal Assist  Toilet Transfers:  (reports min A to [facility] earlier today)  Transfer Method: Stand Step  Skilled Intervention: Verbal Cuing, Tactile Cuing, Compensatory Strategies, Facilitation  Sitting in Chair: NT  Sitting Edge of Bed: >25  Standing: <5  Occupational Therapy:   Self Feeding: Independent  Grooming / Hygiene: Independent  Bathing: Independent  Dressing: Independent  Toileting: Independent  Medication Management: Independent  Laundry: Independent  Kitchen Mobility: Independent  Finances: Independent  Home Management: Independent  Shopping: Independent  Prior Level Of Mobility: Independent Without Device in Home, Independent Without Device in Community  Driving / Transportation: Driving Independent  Prior Services:  Home-Independent  Housing / Facility: 1 Story House (daughters home in [place])  Occupation (Pre-Hospital Vocational): Employed Full Time, Requires Sitting, Desk, Computer Tasks  Current Level of Function:   Eating: Supervision (seated drinking)  Bathing: Maximal Assist (use of washcloths for hilary area)  Upper Body Dressing: Maximal Assist (TLSO )  Lower Body Dressing: Maximal Assist (socks)  Toileting:  (per dtr, pt sat on BSC/toileting w/ A for pericare/safety)  Skilled Intervention: Verbal Cuing, Tactile Cuing, Facilitation, Compensatory Strategies  Comments: limited by arrival of transport  Speech Language Pathology:      Rehabilitation Prognosis/Potential: Good  Estimated Length of Stay: 10-14 days    Nursing:      [place] in Place    Scope/Intensity of Services Recommended:  Physical Therapy: 1.5 hr / day  5 days / week. Therapeutic Interventions Required: Maximize Endurance, Mobility, Strength and Safety  Occupational Therapy: 1.5 hr / day 5 days / week. Therapeutic Interventions Required: Maximize Self Care, ADLs, IADLs and Energy Conservation  Rehabilitation Nursin/7. Therapeutic Interventions Required: Monitor Pain, Skin, Wound(s), Vital Signs, Intake and Output, Labs, Safety and Family Training  Rehabilitation Physician: 3 - 5 days / week. Therapeutic Interventions Required: Medical Management  Respiratory Care: Pulmonary Toileting. Therapeutic Interventions Required: Pulmonary Toileting and O2 Weaning    She requires 24-hour rehabilitation nursing to manage bowel and bladder function, skin care, nutrition and fluid intake, pulmonary hygiene, pain control, safety, medication management and patient/family goals. In addition, rehabilitation nursing will reiterate and reinforce therapy skills and equipment use, including ADLs, as well as provide education to the patient and family. Peg Szymanskiagapito Louie is willing to participate in and is able to tolerate the proposed plan of care.    Rehabilitation Goals and  Plan (Expected frequency & duration of treatment in the IRF):   Return to the Community, Modified Independent Level of Care and Family Able to Provide 24/7 Assistance  Anticipated Date of Rehabilitation Admission: 07-09-21  Patient/Family oriented IRF level of care/facility/plan: Yes  Patient/Family willing to participate in IRF care/facility/plan: Yes  Patient able to tolerate IRF level of care proposed: Yes  Patient has potential to benefit IRF level of care proposed: Yes  Comments: Not Applicable    Special Needs or Precautions - Medical Necessity:  Safety Concerns/Precautions:  Fall Risk / High Risk for Falls, Balance and Bed / Chair Alarm  Pain Management  IV Site: Peripheral  Requires Oxygen  Precautions: Fall Risk, Spinal / Back Precautions , TLSO (Thoracolumbosacral orthosis)  Current Medications:    Current Facility-Administered Medications Ordered in Epic   Medication Dose Route Frequency Provider Last Rate Last Admin   • gabapentin (NEURONTIN) capsule 100 mg  100 mg Oral BID Alvaro Pham M.D.       • traMADol (ULTRAM) 50 MG tablet 50 mg  50 mg Oral Q6HRS PRN Alvaro Pham M.D.   50 mg at 07/09/21 0323   • bacitracin ointment   Topical BID Fei Perry D.O.   Given at 07/08/21 1656   • methocarbamol (ROBAXIN) tablet 750 mg  750 mg Oral 4X/DAY Alvaro Pham M.D.   750 mg at 07/09/21 1033   • ketorolac (TORADOL) injection 30 mg  30 mg Intravenous Q6HRS PRN Alvaro Pham M.D.   30 mg at 07/09/21 1033   • atenolol (TENORMIN) tablet 50 mg  50 mg Oral DAILY Elvin Reed M.D.   50 mg at 07/09/21 0552   • senna-docusate (PERICOLACE or SENOKOT S) 8.6-50 MG per tablet 2 tablet  2 tablet Oral BID Elvin Reed M.D.   2 tablet at 07/08/21 1654    And   • polyethylene glycol/lytes (MIRALAX) PACKET 1 Packet  1 Packet Oral QDAY PRN Elvin Reed M.D.        And   • magnesium hydroxide (MILK OF MAGNESIA) suspension 30 mL  30 mL Oral QDAY PRN Elvin Reed M.D.        And   • bisacodyl (DULCOLAX)  suppository 10 mg  10 mg Rectal QDAY PRN Elvin Reed M.D.       • heparin injection 5,000 Units  5,000 Units Subcutaneous Q8HRS Elvin Reed M.D.   5,000 Units at 07/09/21 0552   • acetaminophen (Tylenol) tablet 650 mg  650 mg Oral Q6HRS PRN Elvin Reed M.D.       • enalaprilat (VASOTEC) injection 1.25 mg  1.25 mg Intravenous Q6HRS PRN Elvin Reed M.D.       • labetalol (NORMODYNE/TRANDATE) injection 10 mg  10 mg Intravenous Q4HRS PRN Elvin Reed M.D.       • ondansetron (ZOFRAN) syringe/vial injection 4 mg  4 mg Intravenous Q4HRS PRN Elvin Reed M.D.       • ondansetron (ZOFRAN ODT) dispertab 4 mg  4 mg Oral Q4HRS PRN Elvin Reed M.D.       • Pharmacy Consult Request ...Pain Management Review 1 Each  1 Each Other PHARMACY TO DOSE Elvin Reed M.D.       • diazePAM (VALIUM) tablet 5 mg  5 mg Oral Q6HRS PRN RISSA Sparrow.P.NYara   5 mg at 07/08/21 0520     No current [facility]-ordered outpatient medications on file.     Diet:   DIET ORDERS (From admission to next 24h)     Start     Ordered    07/06/21 0919  Diet Order Diet: Regular  ALL MEALS     Question:  Diet:  Answer:  Regular    07/06/21 0918                Anticipated Discharge Destination / Patient/Family Goal:  Destination: Home with Assistance Support System: Family   Anticipated home health services: OT and PT  Previously used HH service/ provider: Not Applicable  Anticipated DME Needs: [employer]  Outpatient Services: OT and PT  Alternative resources to address additional identified needs:     Pre-Screen Completed: 7/9/2021 12:11 PM ENRRIQUE Castle.P.MARCIA

## 2021-07-09 NOTE — CARE PLAN
The patient is Stable - Low risk of patient condition declining or worsening    Shift Goals  Clinical Goals: pain control, increased ambulation/movement  Patient Goals: to rehab  Family Goals: to rehab    Progress made toward(s) clinical / shift goals:  pt resting comfortably in bed, with PO pain meds.    Problem: Pain - Standard  Goal: Alleviation of pain or a reduction in pain to the patient’s comfort goal  Outcome: Progressing     Problem: Knowledge Deficit - Standard  Goal: Patient and family/care givers will demonstrate understanding of plan of care, disease process/condition, diagnostic tests and medications  Outcome: Progressing     Problem: Fall Risk  Goal: Patient will remain free from falls  Outcome: Progressing     Problem: Skin Integrity  Goal: Skin integrity is maintained or improved  Outcome: Progressing

## 2021-07-09 NOTE — PROGRESS NOTES
Neurosurgery Progress Note    Subjective:  Pt seen by Dr. Eduardo    Exam:  A/Ox4  BLE strength grossly intact but limited due to pain    BP  Min: 117/62  Max: 131/78  Pulse  Av.3  Min: 63  Max: 82  Resp  Av.8  Min: 16  Max: 18  Temp  Av.7 °C (98 °F)  Min: 36.1 °C (97 °F)  Max: 37 °C (98.6 °F)  SpO2  Av.8 %  Min: 90 %  Max: 92 %    No data recorded    Recent Labs     21  04421  0730   WBC 6.5 6.5   RBC 3.80* 3.68*   HEMOGLOBIN 10.3* 9.9*   HEMATOCRIT 32.6* 31.8*   MCV 85.8 86.4   MCH 27.1 26.9*   MCHC 31.6* 31.1*   RDW 48.9 48.0   PLATELETCT 152* 140*   MPV 9.9 10.4     Recent Labs     21  0730   SODIUM 130* 135   POTASSIUM 4.1 4.3   CHLORIDE 98 99   CO2 24 25   GLUCOSE 108* 90   BUN 19 21   CREATININE 0.73 0.71   CALCIUM 8.8 8.8               Intake/Output                       21 - 21 0621 - 07/10/21 0659      Total  Total                 Intake    P.O.  --  200 200  --  -- --    P.O. -- 200 200 -- -- --    Total Intake -- 200 200 -- -- --       Output    Urine  2700  -- 2700  --  -- --    Output (mL) (Urethral Catheter 16 Fr.) 2700 -- 2700 -- -- --    Emesis  --  0 0  --  -- --    Emesis -- 0 0 -- -- --    Stool  --  -- --  --  -- --    Number of Times Stooled 5 x 1 x 6 x -- -- --    Total Output 2700 0 2700 -- -- --       Net I/O     -2700 200 -2500 -- -- --            Intake/Output Summary (Last 24 hours) at 2021 0944  Last data filed at 2021 2120  Gross per 24 hour   Intake 200 ml   Output 400 ml   Net -200 ml            • traMADol  50 mg Q6HRS PRN   • bacitracin   BID   • methocarbamol  750 mg 4X/DAY   • ketorolac  30 mg Q6HRS PRN   • atenolol  50 mg DAILY   • senna-docusate  2 tablet BID    And   • polyethylene glycol/lytes  1 Packet QDAY PRN    And   • magnesium hydroxide  30 mL QDAY PRN    And   • bisacodyl  10 mg QDAY PRN   • heparin  5,000 Units Q8HRS   • acetaminophen  650 mg  Q6HRS PRN   • enalaprilat  1.25 mg Q6HRS PRN   • labetalol  10 mg Q4HRS PRN   • ondansetron  4 mg Q4HRS PRN   • ondansetron  4 mg Q4HRS PRN   • Pharmacy Consult Request  1 Each PHARMACY TO DOSE   • diazePAM  5 mg Q6HRS PRN       Assessment and Plan:  Hospital day #5 T7 fracture  Prophylactic anticoagulation: no         Start date/time: tbd    Plan:  Continue pain control  TLSO to mobilize:  If able to stand, xrays standing in TLSO reviewed by Dr. Eduardo, continue conservative management   PTOT  Working on placement
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
## 2021-07-09 NOTE — DISCHARGE PLANNING
Anticipated Discharge Disposition: IRF    Action: LSW notified by Levy that pt will transfer to [facility] at 1330. LSW met with pt and pt's  at bedside to provide update. Answered all of pt's 's questions. RN Tank aware of transport time.    Barriers to Discharge: none.    Plan: Pt to DC to [facility] at 1330.

## 2021-07-09 NOTE — THERAPY
Occupational Therapy  Daily Treatment     Patient Name: Peg Louie  Age:  76 y.o., Sex:  female  Medical Record #: [id number]  Today's Date: 7/9/2021     Precautions  Precautions: Fall Risk, Spinal / Back Precautions , TLSO (Thoracolumbosacral orthosis)  Comments: TLSO when OOB and worn OOB >5 min (can don in supine for comfort); per chart c-collar not needed    Assessment    Pt seen for OT session. Progressing with functional mobility, activity tolerance, and txfs. Pt's ex- arrived and educated on snf vs rehab, injury, TLSO, therapy, and OOB activity. Answered family questions. Continues to be limited by decreased functional mobility, activity tolerance, cognition, strength, balance, adherence to precautions, and pain which are currently affecting pt's ability to complete ADLs/IADLs at baseline. Will continue to follow.     Plan    Continue current treatment plan.    DC Equipment Recommendations: Unable to determine at this time  Discharge Recommendations: Recommend post-acute placement for additional occupational therapy services prior to discharge home     Objective     07/09/21 1333   Precautions   Precautions Fall Risk;Spinal / Back Precautions ;TLSO (Thoracolumbosacral orthosis)   Comments TLSO when OOB and worn OOB >5 min (can don in supine for comfort); per chart c-collar not needed   Vitals   O2 Delivery Device Oxymask   Pain 0 - 10 Group   Location Back   Location Orientation Posterior   Therapist Pain Assessment During Activity;Nurse Notified;Post Activity  (not quantified)   Cognition    Cognition / Consciousness X   Level of Consciousness Alert   Attention Impaired   Comments cooperative with therapy and motivated; attention limited by pain   Passive ROM Upper Body   Passive ROM Upper Body WDL   Active ROM Upper Body   Active ROM Upper Body  WDL   Strength Upper Body   Upper Body Strength  X   Comments functional for ADLs, but not tested d/t pain   Other Treatments   Other Treatments  Provided Pt's ex- arrived and educated on snf vs rehab, injury, TLSO, therapy, and OOB activity. Answered family questions.    Balance   Sitting Balance (Static) Fair   Sitting Balance (Dynamic) Fair   Standing Balance (Static) Fair -   Standing Balance (Dynamic) Fair -   Weight Shift Sitting Fair   Weight Shift Standing Fair   Skilled Intervention Verbal Cuing;Tactile Cuing;Facilitation;Compensatory Strategies   Comments w/ FWW   Bed Mobility    Comments found at EOB and left in txf chair   Activities of Daily Living   Grooming Minimal Assist;Standing  (washing hands at sink; min A for balance)   Upper Body Dressing Maximal Assist  (able to assist with straps of TLSO only)   Lower Body Dressing Maximal Assist  (socks)   Toileting Maximal Assist  (for pericare)   Skilled Intervention Verbal Cuing;Tactile Cuing;Facilitation;Compensatory Strategies;Sequencing;Postural Facilitation   Comments limited by pain   How much help from another person does the patient currently need...   Putting on and taking off regular lower body clothing? 2   Bathing (including washing, rinsing, and drying)? 2   Toileting, which includes using a toilet, bedpan, or urinal? 2   Putting on and taking off regular upper body clothing? 2   Taking care of personal grooming such as brushing teeth? 3   Eating meals? 3   6 Clicks Daily Activity Score 14   Functional Mobility   Sit to Stand Minimal Assist   Bed, Chair, Wheelchair Transfer Minimal Assist   Toilet Transfers Minimal Assist  (with BSC as RTS; v/cs for hand placement)   Transfer Method Stand Step   Mobility w/ FWW; eob>toilet>sink>transport chair   Skilled Intervention Verbal Cuing;Tactile Cuing;Compensatory Strategies;Facilitation;Sequencing   Activity Tolerance   Comments limited by fatigue and pain; req v/cs for deep breathing/pacing   Patient / Family Goals   Patient / Family Goal #1 to get better   Short Term Goals   Short Term Goal # 1 LB dressing with min A   Goal Outcome # 1  Progressing slower than expected   Short Term Goal # 2 TLSO don/doff with min A   Goal Outcome # 2 Progressing as expected   Short Term Goal # 3 toilet txf with SPV   Goal Outcome # 3 Progressing as expected   Short Term Goal # 4 toileting with SPV   Goal Outcome # 4 Progressing slower than expected   Short Term Goal # 5 standing g/h w/SPV   Goal Outcome # 5 Progressing as expected

## 2021-07-09 NOTE — CARE PLAN
Problem: Knowledge Deficit - Standard  Goal: Patient and family/care givers will demonstrate understanding of plan of care, disease process/condition, diagnostic tests and medications  Outcome: Progressing     Problem: Risk for Autonomic Dysreflexia  Goal: Patient will show no signs and symptoms of autonomic dysreflexia  Outcome: Progressing     Problem: Neurogenic Bladder  Goal: Patient will demonstrate ability to take care of indwelling catheter  Outcome: Progressing     Problem: Neurogenic Bowel  Goal: Patient will perform adequate hygiene with incontinent episodes  Outcome: Progressing   The patient is Stable - Low risk of patient condition declining or worsening

## 2021-07-09 NOTE — DISCHARGE INSTRUCTIONS
Discharge Instructions    Discharged to [facility] by medical transportation with escort. Discharged via wheelchair, hospital escort: Yes.  Special equipment needed: Not Applicable    Be sure to schedule a follow-up appointment with your primary care doctor or any specialists as instructed.     Discharge Plan:   Diet Plan: Discussed  Activity Level: Discussed  Confirmed Follow up Appointment: Patient to Call and Schedule Appointment  Confirmed Symptoms Management: Discussed  Medication Reconciliation Updated: Yes    I understand that a diet low in cholesterol, fat, and sodium is recommended for good health. Unless I have been given specific instructions below for another diet, I accept this instruction as my diet prescription.       Special Instructions:   Thoracic Spine Fracture  A thoracic spine fracture is a break in one of the bones of the middle part of the back. Thoracic spine fractures can vary from mild to severe. The most severe types are those that:  · Cause the broken bones to move out of place (unstable).  · Injure or press on the spinal cord.  In some cases, the bone that connects to the lower part of the back may also have a break (thoracolumbar fracture).  What are the causes?  This condition may be caused by:  · A motor vehicle collision.  · A fall or jump from a great height.  · A collision during sports.  · Violent acts, such as an assault or gunshot.  What increases the risk?  You are more likely to develop this condition if:  · You have osteoporosis.  · You play contact sports.  · You are in a situation that could result in a fall or other violent injury.  What are the signs or symptoms?  Symptoms of this injury depend on the type of fracture. The main symptom of this condition is back pain that gets worse with movement.  Other symptoms include:  · Trouble standing or walking.  · Numbness.  · Tingling.  · Weakness.  · Loss of movement.  · Loss of bowel or bladder control  (incontinence).  How is this diagnosed?  This condition may be diagnosed based on:  · Your symptoms and medical history.  · A physical exam.  · Imaging tests, such as:  ? X-rays.  ? CT scan.  ? MRI.  How is this treated?  Treatment for this injury depends on the type of fracture. If your fracture is stable and does not affect your spinal cord, it may heal with treatments such as:  · Medicines for pain.  · A cast or a brace.  · Physical therapy.  If your fracture is unstable or if it affects your spinal cord, you may need surgery.  Follow these instructions at home:  Medicines  · Take over-the-counter and prescription medicines only as told by your health care provider.  · Do not drive or use heavy machinery while taking pain medicine.  · To prevent or treat constipation while you are taking prescription pain medicine, your health care provider may recommend that you:  ? Drink enough fluid to keep your urine pale yellow.  ? Take over-the-counter or prescription medicines.  ? Eat foods that are high in fiber, such as fresh fruits and vegetables, whole grains, and beans.  ? Limit foods that are high in fat and processed sugars, such as fried or sweet foods.  If you have a brace:  · Wear the brace as told by your health care provider. Remove it only as told by your health care provider.  · Keep the brace clean.  · If the brace is not waterproof:  ? Do not let it get wet.  ? Cover it with a watertight covering when you take a bath or a shower.  Activity  · Stay in bed (on bed rest) only as directed by your health care provider. Being on bed rest for too long can make your condition worse.  · Return to your normal activities as directed by your health care provider. Ask what activities are safe for you.  · Do exercises to improve motion and strength in your back (physical therapy), as recommended by your health care provider.  · Exercise regularly as told by your health care provider.  Managing pain, stiffness, and  swelling    · If directed, apply ice to the injured area:  ? Put ice in a plastic bag.  ? Place a towel between your skin and the bag.  ? Leave the ice on for 20 minutes, 2-3 times a day.  General instructions  · Do not use any products that contain nicotine or tobacco, such as cigarettes and e-cigarettes. These can delay healing after injury. If you need help quitting, ask your health care provider.  · Do not drink alcohol. Alcohol can interfere with your treatment.  · Keep all follow-up visits as directed by your health care provider. This is important. It can help to prevent permanent injury, disability, and long-lasting (chronic) pain.  Contact a health care provider if:  · You have a fever.  · You develop a cough that makes your pain worse.  · Your pain medicine is not helping.  · Your pain does not get better over time.  · You cannot return to your normal activities as planned or expected.  Get help right away if:  · Your pain is very bad and it suddenly gets worse.  · You are unable to move any part of your body (paralysis) that is below the level of your injury.  · You have numbness, tingling, or weakness in any part of your body that is below the level of your injury.  · You cannot control your bladder or bowels.  Summary  · A thoracic spine fracture is a break in one of the bones of the middle part of the back.  · Treatment for this injury depends on the type of fracture.  · A stable fracture can be treated with a back brace, activity restrictions, pain medicine, and physical therapy. A more severe break may require surgery.  · Make sure you know what symptoms should cause you to get help right away.  This information is not intended to replace advice given to you by your health care provider. Make sure you discuss any questions you have with your health care provider.  Document Released: 01/25/2006 Document Revised: 02/01/2019 Document Reviewed: 02/01/2019  Elsevier Patient Education © 2020 [employer]vier  [employer]    · Is patient discharged on Warfarin / Coumadin?   No     Depression / Suicide Risk    As you are discharged from this [facility] facility, it is important to learn how to keep safe from harming yourself.    Recognize the warning signs:  · Abrupt changes in personality, positive or negative- including increase in energy   · Giving away possessions  · Change in eating patterns- significant weight changes-  positive or negative  · Change in sleeping patterns- unable to sleep or sleeping all the time   · Unwillingness or inability to communicate  · Depression  · Unusual sadness, discouragement and loneliness  · Talk of wanting to die  · Neglect of personal appearance   · Rebelliousness- reckless behavior  · Withdrawal from people/activities they love  · Confusion- inability to concentrate     If you or a loved one observes any of these behaviors or has concerns about self-harm, here's what you can do:  · Talk about it- your feelings and reasons for harming yourself  · Remove any means that you might use to hurt yourself (examples: pills, rope, extension cords, firearm)  · Get professional help from the community (Mental Health, Substance Abuse, psychological counseling)  · Do not be alone:Call your Safe Contact- someone whom you trust who will be there for you.  · Call your local CRISIS HOTLINE 724-6413 or 924-190-1544  · Call your local Children's Mobile Crisis Response Team [place] (172) 110-5061 or www.[facility]  · Call the toll free National Suicide Prevention Hotlines   · National Suicide Prevention Lifeline 130-793-NIYC (0840)  · National Hope Line Network 800-SUICIDE (863-5197)    Methocarbamol tablets  What is this medicine?  METHOCARBAMOL (meth oh NEREYDA ba mole) helps to relieve pain and stiffness in muscles caused by strains, sprains, or other injury to your muscles.  This medicine may be used for other purposes; ask your health care provider or pharmacist if you have questions.  COMMON  BRAND NAME(S): Robaxin  What should I tell my health care provider before I take this medicine?  They need to know if you have any of these conditions:  · kidney disease  · seizures  · an unusual or allergic reaction to methocarbamol, other medicines, foods, dyes, or preservatives  · pregnant or trying to get pregnant  · breast-feeding  How should I use this medicine?  Take this medicine by mouth with a full glass of water. Follow the directions on the prescription label. Take your medicine at regular intervals. Do not take your medicine more often than directed.  Talk to your pediatrician regarding the use of this medicine in children. Special care may be needed.  Overdosage: If you think you have taken too much of this medicine contact a poison control center or emergency room at once.  NOTE: This medicine is only for you. Do not share this medicine with others.  What if I miss a dose?  If you miss a dose, take it as soon as you can. If it is almost time for your next dose, take only the next dose. Do not take double or extra doses.  What may interact with this medicine?  Do not take this medication with any of the following medicines:  · narcotic medicines for cough  This medicine may also interact with the following medications:  · alcohol  · antihistamines for allergy, cough and cold  · certain medicines for anxiety or sleep  · certain medicines for depression like amitriptyline, fluoxetine, sertraline  · certain medicines for seizures like phenobarbital, primidone  · cholinesterase inhibitors like neostigmine, ambenonium, and pyridostigmine bromide  · general anesthetics like halothane, isoflurane, methoxyflurane, propofol  · local anesthetics like lidocaine, pramoxine, tetracaine  · medicines that relax muscles for surgery  · narcotic medicines for pain  · phenothiazines like chlorpromazine, mesoridazine, prochlorperazine, thioridazine  This list may not describe all possible interactions. Give your health  care provider a list of all the medicines, herbs, non-prescription drugs, or dietary supplements you use. Also tell them if you smoke, drink alcohol, or use illegal drugs. Some items may interact with your medicine.  What should I watch for while using this medicine?  Tell your doctor or health care professional if your symptoms do not start to get better or if they get worse.  You may get drowsy or dizzy. Do not drive, use machinery, or do anything that needs mental alertness until you know how this medicine affects you. Do not stand or sit up quickly, especially if you are an older patient. This reduces the risk of dizzy or fainting spells. Alcohol may interfere with the effect of this medicine. Avoid alcoholic drinks.  If you are taking another medicine that also causes drowsiness, you may have more side effects. Give your health care provider a list of all medicines you use. Your doctor will tell you how much medicine to take. Do not take more medicine than directed. Call emergency for help if you have problems breathing or unusual sleepiness.  What side effects may I notice from receiving this medicine?  Side effects that you should report to your doctor or health care professional as soon as possible:  · allergic reactions like skin rash, itching or hives, swelling of the face, lips, or tongue  · breathing problems  · confusion  · seizures  · unusually weak or tired  Side effects that usually do not require medical attention (report to your doctor or health care professional if they continue or are bothersome):  · dizziness  · headache  · metallic taste  · tiredness  · upset stomach  This list may not describe all possible side effects. Call your doctor for medical advice about side effects. You may report side effects to FDA at 5-394-FDA-9576.  Where should I keep my medicine?  Keep out of the reach of children.  Store at room temperature between 20 and 25 degrees C (68 and 77 degrees F). Keep container  tightly closed. Throw away any unused medicine after the expiration date.  NOTE: This sheet is a summary. It may not cover all possible information. If you have questions about this medicine, talk to your doctor, pharmacist, or health care provider.  © 2020 Elsevier/Gold Standard (2016-09-27 13:11:54)      Gabapentin capsules or tablets  What is this medicine?  GABAPENTIN (GA ba pen tin) is used to control seizures in certain types of epilepsy. It is also used to treat certain types of nerve pain.  This medicine may be used for other purposes; ask your health care provider or pharmacist if you have questions.  COMMON BRAND NAME(S): Active-PAC with Gabapentin, Gabarone, Neurontin  What should I tell my health care provider before I take this medicine?  They need to know if you have any of these conditions:  · history of drug abuse or alcohol abuse problem  · kidney disease  · lung or breathing disease  · suicidal thoughts, plans, or attempt; a previous suicide attempt by you or a family member  · an unusual or allergic reaction to gabapentin, other medicines, foods, dyes, or preservatives  · pregnant or trying to get pregnant  · breast-feeding  How should I use this medicine?  Take this medicine by mouth with a glass of water. Follow the directions on the prescription label. You can take it with or without food. If it upsets your stomach, take it with food. Take your medicine at regular intervals. Do not take it more often than directed. Do not stop taking except on your doctor's advice.  If you are directed to break the 600 or 800 mg tablets in half as part of your dose, the extra half tablet should be used for the next dose. If you have not used the extra half tablet within 28 days, it should be thrown away.  A special MedGuide will be given to you by the pharmacist with each prescription and refill. Be sure to read this information carefully each time.  Talk to your pediatrician regarding the use of this medicine  in children. While this drug may be prescribed for children as young as 3 years for selected conditions, precautions do apply.  Overdosage: If you think you have taken too much of this medicine contact a poison control center or emergency room at once.  NOTE: This medicine is only for you. Do not share this medicine with others.  What if I miss a dose?  If you miss a dose, take it as soon as you can. If it is almost time for your next dose, take only that dose. Do not take double or extra doses.  What may interact with this medicine?  This medicine may interact with the following medications:  · alcohol  · antihistamines for allergy, cough, and cold  · certain medicines for anxiety or sleep  · certain medicines for depression like amitriptyline, fluoxetine, sertraline  · certain medicines for seizures like phenobarbital, primidone  · certain medicines for stomach problems  · general anesthetics like halothane, isoflurane, methoxyflurane, propofol  · local anesthetics like lidocaine, pramoxine, tetracaine  · medicines that relax muscles for surgery  · narcotic medicines for pain  · phenothiazines like chlorpromazine, mesoridazine, prochlorperazine, thioridazine  This list may not describe all possible interactions. Give your health care provider a list of all the medicines, herbs, non-prescription drugs, or dietary supplements you use. Also tell them if you smoke, drink alcohol, or use illegal drugs. Some items may interact with your medicine.  What should I watch for while using this medicine?  Visit your doctor or health care provider for regular checks on your progress. You may want to keep a record at home of how you feel your condition is responding to treatment. You may want to share this information with your doctor or health care provider at each visit. You should contact your doctor or health care provider if your seizures get worse or if you have any new types of seizures. Do not stop taking this medicine  or any of your seizure medicines unless instructed by your doctor or health care provider. Stopping your medicine suddenly can increase your seizures or their severity.  This medicine may cause serious skin reactions. They can happen weeks to months after starting the medicine. Contact your health care provider right away if you notice fevers or flu-like symptoms with a rash. The rash may be red or purple and then turn into blisters or peeling of the skin. Or, you might notice a red rash with swelling of the face, lips or lymph nodes in your neck or under your arms.  Wear a medical identification bracelet or chain if you are taking this medicine for seizures, and carry a card that lists all your medications.  You may get drowsy, dizzy, or have blurred vision. Do not drive, use machinery, or do anything that needs mental alertness until you know how this medicine affects you. To reduce dizzy or fainting spells, do not sit or stand up quickly, especially if you are an older patient. Alcohol can increase drowsiness and dizziness. Avoid alcoholic drinks.  Your mouth may get dry. Chewing sugarless gum or sucking hard candy, and drinking plenty of water will help.  The use of this medicine may increase the chance of suicidal thoughts or actions. Pay special attention to how you are responding while on this medicine. Any worsening of mood, or thoughts of suicide or dying should be reported to your health care provider right away.  Women who become pregnant while using this medicine may enroll in the [place] Antiepileptic Drug Pregnancy Registry by calling 1-655.905.9631. This registry collects information about the safety of antiepileptic drug use during pregnancy.  What side effects may I notice from receiving this medicine?  Side effects that you should report to your doctor or health care professional as soon as possible:  · allergic reactions like skin rash, itching or hives, swelling of the face, lips, or  tongue  · breathing problems  · rash, fever, and swollen lymph nodes  · redness, blistering, peeling or loosening of the skin, including inside the mouth  · suicidal thoughts, mood changes  Side effects that usually do not require medical attention (report to your doctor or health care professional if they continue or are bothersome):  · dizziness  · drowsiness  · headache  · nausea, vomiting  · swelling of ankles, feet, hands  · tiredness  This list may not describe all possible side effects. Call your doctor for medical advice about side effects. You may report side effects to FDA at 9-837-DFM-4259.  Where should I keep my medicine?  Keep out of reach of children.  This medicine may cause accidental overdose and death if it taken by other adults, children, or pets. Mix any unused medicine with a substance like cat litter or coffee grounds. Then throw the medicine away in a sealed container like a sealed bag or a coffee can with a lid. Do not use the medicine after the expiration date.  Store at room temperature between 15 and 30 degrees C (59 and 86 degrees F).  NOTE: This sheet is a summary. It may not cover all possible information. If you have questions about this medicine, talk to your doctor, pharmacist, or health care provider.  © 2020 [employer]vier/Gold Standard (2020-03-20 14:16:43)

## 2021-07-09 NOTE — DISCHARGE SUMMARY
Discharge Summary    CHIEF COMPLAINT ON ADMISSION  Chief Complaint   Patient presents with   • T-5000 FALL     brought in by Med flight transfer from [facility]. patient sustrained C 7 and T 8 unstable fractures s/p Fall from a van. denies loc. Neuro intact. arrived on [facility] collar.       Reason for Admission  EMS     CODE STATUS  Full Code    HPI & HOSPITAL COURSE  This is 76-year-old female with a past medical history significant for obesity with Body mass index is 39.14 kg/m².  , arrhythmia, htn [facility]level [facility].    She reports that he was trying to close the door while sitting on the side of the bed, she lost balance ; fell into the ground hitting her head and back.     CT of the thoracic spine: T8-Severe anterior wedge compression fracture with bursting pattern, greater than 50% height loss..  Posterior wall retropulsed 5 mm T7 spinous fracture at its tip.   CT head: No evidence of mass or bleed.  CT cervical spine, DJD without acute fractures or dislocations.  CT of the abdomen/pelvis: No abdominal injury found, large hiatal hernia.     Neurosurgery Dr. Eduardo was consulted who recommended no surgical intervention.  Recommended  T SLO brace, PT/OT has evaluated the patient, recommended acute rehab.  Acute rehab has evaluate the patient; and patient will [facility].    Plan of care has been explained to the patient, her daughter Hayden, her  along with bedside RN and case management .    Therefore, she is discharged in good and stable condition to home with close outpatient follow-up.    The patient met 2-midnight criteria for an inpatient stay at the time of discharge.      FOLLOW UP ITEMS POST DISCHARGE  7/9/2021    DISCHARGE DIAGNOSES  Active Problems:    Spinal fracture of T7 vertebra (HCC) POA: Unknown    Spinal fracture of T8 vertebra (HCC) POA: Yes    HTN (hypertension) POA: Unknown    Obesity POA: Unknown     Anemia POA: Unknown    Debility POA: Unknown    Thrombocytopenia (HCC) POA: Unknown  Resolved Problems:    Arrhythmia POA: Unknown    Hyponatremia POA: Unknown      FOLLOW UP  No future appointments.  No follow-up provider specified.    MEDICATIONS ON DISCHARGE     Medication List      START taking these medications      Instructions   gabapentin 100 MG Caps  Commonly known as: NEURONTIN   Take 1 capsule by mouth 2 times a day.  Dose: 100 mg     methocarbamol 750 MG Tabs  Commonly known as: ROBAXIN   Take 1 tablet by mouth 4 times a day.  Dose: 750 mg     polyethylene glycol/lytes 17 g Pack  Commonly known as: MIRALAX   Take 1 Packet by mouth 1 time a day as needed (if sennosides and docusate ineffective after 24 hours).  Dose: 17 g        CONTINUE taking these medications      Instructions   atenolol 50 MG Tabs  Commonly known as: TENORMIN   Take 50 mg by mouth every day.  Dose: 50 mg     CALCIUM PO   Take 1 tablet by mouth every day.  Dose: 1 tablet     fluticasone 50 MCG/ACT nasal spray  Commonly known as: FLONASE   Administer 1 Spray into affected nostril(S) at bedtime.  Dose: 1 Spray     therapeutic multivitamin-minerals Tabs   Take 1 tablet by mouth every day.  Dose: 1 tablet     VITAMIN B COMPLEX PO   Take 1 capsule by mouth every morning.  Dose: 1 capsule     VITAMIN C PO   Take 1 tablet by mouth every morning.  Dose: 1 tablet     vitamin e 400 UNIT Caps  Commonly known as: VITAMIN E   Take 400 Units by mouth every day.  Dose: 400 Units        STOP taking these medications    ibuprofen 200 MG Tabs  Commonly known as: MOTRIN     POTASSIUM PO            Allergies  Allergies   Allergen Reactions   • Gluten Meal        DIET  Orders Placed This Encounter   Procedures   • Diet Order Diet: Regular     Standing Status:   Standing     Number of Occurrences:   1     Order Specific Question:   Diet:     Answer:   Regular [1]       ACTIVITY  As tolerated.  Weight bearing as tolerated    LINES, DRAINS, AND WOUNDS  This  is an automated list. Peripheral IVs will be removed prior to discharge.  Peripheral IV 07/06/21 20 G Right Hand (Active)   Site Assessment Clean;Dry;Intact 07/08/21 2120   Dressing Type Transparent 07/08/21 2120   Line Status Scrubbed the hub prior to access;Flushed;Saline locked 07/08/21 2120   Dressing Status Clean;Dry;Intact 07/08/21 2120   Dressing Intervention N/A 07/08/21 2120   Infiltration Grading ([facility], [facility]) 0 07/08/21 2120   Phlebitis Scale ([facility] Only) 0 07/08/21 2120       Peripheral IV 07/06/21 20 G Left Antecubital (Active)   Site Assessment Clean;Dry;Intact 07/08/21 2120   Dressing Type Transparent 07/08/21 2120   Line Status Scrubbed the hub prior to access;Flushed;Saline locked 07/08/21 2120   Dressing Status Clean;Dry;Intact 07/08/21 2120   Dressing Intervention N/A 07/08/21 2120   Infiltration Grading ([facility], [facility]) 0 07/08/21 2120   Phlebitis Scale ([facility] Only) 0 07/08/21 2120     Urethral Catheter 16 Fr. (Active)   Site Assessment Clean;Skin intact 07/08/21 0815   Collection Container Standard drainage bag 07/08/21 0815   Urinary Catheter Care Tamper Evident Seal in Place;Drainage Tube Extended;Drainage Bag Not Overfilled;Drainage Tubing Properly Secured;Drainage Bag Below Bladder Level and Not on Floor;Cath Care Done with Soap & Water 07/07/21 2100   Securement Method Securing device (Describe) 07/08/21 0815   Output (mL) 400 mL 07/08/21 1826         Peripheral IV 07/06/21 20 G Right Hand (Active)   Site Assessment Clean;Dry;Intact 07/08/21 2120   Dressing Type Transparent 07/08/21 2120   Line Status Scrubbed the hub prior to access;Flushed;Saline locked 07/08/21 2120   Dressing Status Clean;Dry;Intact 07/08/21 2120   Dressing Intervention N/A 07/08/21 2120   Infiltration Grading ([facility]) 0 07/08/21 2120   Phlebitis Scale ([facility] Only) 0 07/08/21 2120       Peripheral IV 07/06/21 20 G Left Antecubital (Active)   Site Assessment Clean;Dry;Intact 07/08/21 2120   Dressing Type  Transparent 07/08/21 2120   Line Status Scrubbed the hub prior to access;Flushed;Saline locked 07/08/21 2120   Dressing Status Clean;Dry;Intact 07/08/21 2120   Dressing Intervention N/A 07/08/21 2120   Infiltration Grading ([facility] 0 07/08/21 2120   Phlebitis Scale ([facility]) 0 07/08/21 2120           Urethral Catheter 16 Fr. (Active)   Site Assessment Clean;Skin intact 07/08/21 0815   Collection Container Standard drainage bag 07/08/21 0815   Urinary Catheter Care Tamper Evident Seal in Place;Drainage Tube Extended;Drainage Bag Not Overfilled;Drainage Tubing Properly Secured;Drainage Bag Below Bladder Level and Not on Floor;Cath Care Done with Soap & Water 07/07/21 2100   Securement Method Securing device (Describe) 07/08/21 0815   Output (mL) 400 mL 07/08/21 1826        MENTAL STATUS ON TRANSFER             CONSULTATIONS  neurosurgery    PROCEDURES  None     LABORATORY  Lab Results   Component Value Date    SODIUM 135 07/08/2021    POTASSIUM 4.3 07/08/2021    CHLORIDE 99 07/08/2021    CO2 25 07/08/2021    GLUCOSE 90 07/08/2021    BUN 21 07/08/2021    CREATININE 0.71 07/08/2021        Lab Results   Component Value Date    WBC 6.5 07/08/2021    HEMOGLOBIN 9.9 (L) 07/08/2021    HEMATOCRIT 31.8 (L) 07/08/2021    PLATELETCT 140 (L) 07/08/2021        Total time of the discharge process exceeds 35 minutes.    I went to the patient, evaluate the patient, representative.  Patient is alert and oriented, answering question appropriately.  At this time patient is medically stable to be discharged to Rehab

## 2021-07-09 NOTE — PROGRESS NOTES
Report called to sharon RN at [facility] re[facility], patient updated.

## 2021-07-09 NOTE — PROGRESS NOTES
Discharge document and RX was Provided and explained to pt. Verbalized understanding. Iv removed, tip intact. Pt discharged to [facility]hab with all personal belongings

## 2021-07-09 NOTE — DISCHARGE PLANNING
[facility] Transitional Care Coordination    Referral from:  Dr. Pham    Insurance Provider on Facesheet: M[employer]    Potential Rehab Diagnosis: Other Ortho    Chart review indicates patient may have on going medical management and may have therapy needs to possibly meet inpatient rehab facility criteria with the goal of returning to community.    D/C support: TBD     Physiatry consultation forwarded per protocol.     Other Ortho.  Dr. Pham has medically cleared.  Physiatry to consult.  Spoke with Hayden, daughter regarding [facility] and D/C resources/support.  She is agreeable with an admission. Hayden lives in [place] in a 1LV home.  She is staying in [place] if Peg is accepted to Rehab.  Peg will D/C home Hayden after Rehab either by car or plane.       Thank you for the referral.     1217-Case is under review by Dr. Penn.     1233-Dr. Penn has accepted Peg.  [facility]  Dr. Pham, Hayden daughter & Tank BSN are aware.  Msg placed to Melanie URBINA

## 2021-07-10 ENCOUNTER — APPOINTMENT (OUTPATIENT)
Dept: RADIOLOGY | Facility: REHABILITATION | Age: 76
DRG: 559 | End: 2021-07-10
Attending: PHYSICAL MEDICINE & REHABILITATION
Payer: MEDICARE

## 2021-07-10 LAB
25(OH)D3 SERPL-MCNC: 41 NG/ML (ref 30–100)
ALBUMIN SERPL BCP-MCNC: 3.5 G/DL (ref 3.2–4.9)
ALBUMIN/GLOB SERPL: 1.2 G/DL
ALP SERPL-CCNC: 77 U/L (ref 30–99)
ALT SERPL-CCNC: 15 U/L (ref 2–50)
ANION GAP SERPL CALC-SCNC: 10 MMOL/L (ref 7–16)
AST SERPL-CCNC: 20 U/L (ref 12–45)
BASOPHILS # BLD AUTO: 0.6 % (ref 0–1.8)
BASOPHILS # BLD: 0.03 K/UL (ref 0–0.12)
BILIRUB SERPL-MCNC: 0.5 MG/DL (ref 0.1–1.5)
BUN SERPL-MCNC: 22 MG/DL (ref 8–22)
CALCIUM SERPL-MCNC: 8.7 MG/DL (ref 8.5–10.5)
CHLORIDE SERPL-SCNC: 97 MMOL/L (ref 96–112)
CO2 SERPL-SCNC: 25 MMOL/L (ref 20–33)
CREAT SERPL-MCNC: 0.64 MG/DL (ref 0.5–1.4)
EOSINOPHIL # BLD AUTO: 0.07 K/UL (ref 0–0.51)
EOSINOPHIL NFR BLD: 1.5 % (ref 0–6.9)
ERYTHROCYTE [DISTWIDTH] IN BLOOD BY AUTOMATED COUNT: 45.9 FL (ref 35.9–50)
EST. AVERAGE GLUCOSE BLD GHB EST-MCNC: 134 MG/DL
GLOBULIN SER CALC-MCNC: 3 G/DL (ref 1.9–3.5)
GLUCOSE SERPL-MCNC: 86 MG/DL (ref 65–99)
HBA1C MFR BLD: 6.3 % (ref 4–5.6)
HCT VFR BLD AUTO: 31.1 % (ref 37–47)
HGB BLD-MCNC: 10 G/DL (ref 12–16)
IMM GRANULOCYTES # BLD AUTO: 0.03 K/UL (ref 0–0.11)
IMM GRANULOCYTES NFR BLD AUTO: 0.6 % (ref 0–0.9)
LYMPHOCYTES # BLD AUTO: 1.14 K/UL (ref 1–4.8)
LYMPHOCYTES NFR BLD: 23.7 % (ref 22–41)
MCH RBC QN AUTO: 27.2 PG (ref 27–33)
MCHC RBC AUTO-ENTMCNC: 32.2 G/DL (ref 33.6–35)
MCV RBC AUTO: 84.7 FL (ref 81.4–97.8)
MONOCYTES # BLD AUTO: 0.32 K/UL (ref 0–0.85)
MONOCYTES NFR BLD AUTO: 6.7 % (ref 0–13.4)
NEUTROPHILS # BLD AUTO: 3.22 K/UL (ref 2–7.15)
NEUTROPHILS NFR BLD: 66.9 % (ref 44–72)
NRBC # BLD AUTO: 0 K/UL
NRBC BLD-RTO: 0 /100 WBC
PLATELET # BLD AUTO: 142 K/UL (ref 164–446)
PMV BLD AUTO: 10.4 FL (ref 9–12.9)
POTASSIUM SERPL-SCNC: 3.8 MMOL/L (ref 3.6–5.5)
PROT SERPL-MCNC: 6.5 G/DL (ref 6–8.2)
RBC # BLD AUTO: 3.67 M/UL (ref 4.2–5.4)
SARS-COV-2 RNA RESP QL NAA+PROBE: NOTDETECTED
SODIUM SERPL-SCNC: 132 MMOL/L (ref 135–145)
SPECIMEN SOURCE: NORMAL
TSH SERPL DL<=0.005 MIU/L-ACNC: 3.57 UIU/ML (ref 0.38–5.33)
WBC # BLD AUTO: 4.8 K/UL (ref 4.8–10.8)

## 2021-07-10 PROCEDURE — 700111 HCHG RX REV CODE 636 W/ 250 OVERRIDE (IP): Performed by: PHYSICAL MEDICINE & REHABILITATION

## 2021-07-10 PROCEDURE — 82306 VITAMIN D 25 HYDROXY: CPT

## 2021-07-10 PROCEDURE — 97530 THERAPEUTIC ACTIVITIES: CPT

## 2021-07-10 PROCEDURE — 97116 GAIT TRAINING THERAPY: CPT

## 2021-07-10 PROCEDURE — 99233 SBSQ HOSP IP/OBS HIGH 50: CPT | Performed by: PHYSICAL MEDICINE & REHABILITATION

## 2021-07-10 PROCEDURE — A9270 NON-COVERED ITEM OR SERVICE: HCPCS | Performed by: PHYSICAL MEDICINE & REHABILITATION

## 2021-07-10 PROCEDURE — 700102 HCHG RX REV CODE 250 W/ 637 OVERRIDE(OP): Performed by: PHYSICAL MEDICINE & REHABILITATION

## 2021-07-10 PROCEDURE — 97165 OT EVAL LOW COMPLEX 30 MIN: CPT

## 2021-07-10 PROCEDURE — 85025 COMPLETE CBC W/AUTO DIFF WBC: CPT

## 2021-07-10 PROCEDURE — 97162 PT EVAL MOD COMPLEX 30 MIN: CPT

## 2021-07-10 PROCEDURE — 94760 N-INVAS EAR/PLS OXIMETRY 1: CPT

## 2021-07-10 PROCEDURE — 97110 THERAPEUTIC EXERCISES: CPT

## 2021-07-10 PROCEDURE — 80053 COMPREHEN METABOLIC PANEL: CPT

## 2021-07-10 PROCEDURE — 700105 HCHG RX REV CODE 258

## 2021-07-10 PROCEDURE — 700111 HCHG RX REV CODE 636 W/ 250 OVERRIDE (IP)

## 2021-07-10 PROCEDURE — 83036 HEMOGLOBIN GLYCOSYLATED A1C: CPT

## 2021-07-10 PROCEDURE — 36415 COLL VENOUS BLD VENIPUNCTURE: CPT

## 2021-07-10 PROCEDURE — 71045 X-RAY EXAM CHEST 1 VIEW: CPT

## 2021-07-10 PROCEDURE — 84443 ASSAY THYROID STIM HORMONE: CPT

## 2021-07-10 PROCEDURE — 770010 HCHG ROOM/CARE - REHAB SEMI PRIVAT*

## 2021-07-10 RX ORDER — ATENOLOL 25 MG/1
50 TABLET ORAL
Status: DISCONTINUED | OUTPATIENT
Start: 2021-07-11 | End: 2021-07-20 | Stop reason: HOSPADM

## 2021-07-10 RX ORDER — SODIUM CHLORIDE 9 MG/ML
INJECTION, SOLUTION INTRAVENOUS
Status: COMPLETED
Start: 2021-07-10 | End: 2021-07-10

## 2021-07-10 RX ORDER — PIPERACILLIN SODIUM, TAZOBACTAM SODIUM 4; .5 G/20ML; G/20ML
INJECTION, POWDER, LYOPHILIZED, FOR SOLUTION INTRAVENOUS
Status: COMPLETED
Start: 2021-07-10 | End: 2021-07-10

## 2021-07-10 RX ADMIN — ATENOLOL 50 MG: 25 TABLET ORAL at 08:42

## 2021-07-10 RX ADMIN — OXYCODONE HYDROCHLORIDE 10 MG: 10 TABLET ORAL at 17:38

## 2021-07-10 RX ADMIN — GABAPENTIN 100 MG: 100 CAPSULE ORAL at 05:06

## 2021-07-10 RX ADMIN — OXYCODONE HYDROCHLORIDE 10 MG: 10 TABLET ORAL at 21:23

## 2021-07-10 RX ADMIN — BACITRACIN ZINC: 500 OINTMENT TOPICAL at 09:00

## 2021-07-10 RX ADMIN — GABAPENTIN 100 MG: 100 CAPSULE ORAL at 17:38

## 2021-07-10 RX ADMIN — TRAMADOL HYDROCHLORIDE 50 MG: 50 TABLET, FILM COATED ORAL at 12:21

## 2021-07-10 RX ADMIN — BACITRACIN ZINC: 500 OINTMENT TOPICAL at 20:39

## 2021-07-10 RX ADMIN — SENNOSIDES AND DOCUSATE SODIUM 2 TABLET: 8.6; 5 TABLET ORAL at 08:43

## 2021-07-10 RX ADMIN — METHOCARBAMOL TABLETS 750 MG: 750 TABLET, COATED ORAL at 20:38

## 2021-07-10 RX ADMIN — PIPERACILLIN AND TAZOBACTAM 4.5 G: 4; .5 INJECTION, POWDER, LYOPHILIZED, FOR SOLUTION INTRAVENOUS; PARENTERAL at 23:34

## 2021-07-10 RX ADMIN — SENNOSIDES AND DOCUSATE SODIUM 2 TABLET: 8.6; 5 TABLET ORAL at 20:38

## 2021-07-10 RX ADMIN — METHOCARBAMOL TABLETS 750 MG: 750 TABLET, COATED ORAL at 12:21

## 2021-07-10 RX ADMIN — OXYCODONE 5 MG: 5 TABLET ORAL at 04:03

## 2021-07-10 RX ADMIN — METHOCARBAMOL TABLETS 750 MG: 750 TABLET, COATED ORAL at 08:42

## 2021-07-10 RX ADMIN — METHOCARBAMOL TABLETS 750 MG: 750 TABLET, COATED ORAL at 17:38

## 2021-07-10 RX ADMIN — SODIUM CHLORIDE 500 ML: 9 INJECTION, SOLUTION INTRAVENOUS at 23:37

## 2021-07-10 RX ADMIN — OXYCODONE HYDROCHLORIDE 10 MG: 10 TABLET ORAL at 08:42

## 2021-07-10 RX ADMIN — HEPARIN SODIUM 5000 UNITS: 5000 INJECTION, SOLUTION INTRAVENOUS; SUBCUTANEOUS at 14:52

## 2021-07-10 RX ADMIN — HEPARIN SODIUM 5000 UNITS: 5000 INJECTION, SOLUTION INTRAVENOUS; SUBCUTANEOUS at 05:06

## 2021-07-10 RX ADMIN — HEPARIN SODIUM 5000 UNITS: 5000 INJECTION, SOLUTION INTRAVENOUS; SUBCUTANEOUS at 20:37

## 2021-07-10 ASSESSMENT — ACTIVITIES OF DAILY LIVING (ADL)
BED_CHAIR_WHEELCHAIR_TRANSFER_DESCRIPTION: INCREASED TIME;SET-UP OF EQUIPMENT;SUPERVISION FOR SAFETY;INITIAL PREPARATION FOR TASK
BED_CHAIR_WHEELCHAIR_TRANSFER_DESCRIPTION: INCREASED TIME;SET-UP OF EQUIPMENT;INITIAL PREPARATION FOR TASK;SUPERVISION FOR SAFETY;VERBAL CUEING
TOILETING: INDEPENDENT
BED_CHAIR_WHEELCHAIR_TRANSFER_DESCRIPTION: ADAPTIVE EQUIPMENT;SET-UP OF EQUIPMENT;SUPERVISION FOR SAFETY;VERBAL CUEING;INCREASED TIME
TOILET_TRANSFER_DESCRIPTION: GRAB BAR;INCREASED TIME;SET-UP OF EQUIPMENT;SUPERVISION FOR SAFETY;VERBAL CUEING
TOILETING: INDEPENDENT
BED_CHAIR_WHEELCHAIR_TRANSFER_DESCRIPTION: INCREASED TIME;ADAPTIVE EQUIPMENT;SET-UP OF EQUIPMENT;SUPERVISION FOR SAFETY
TOILET_TRANSFER_DESCRIPTION: INCREASED TIME;SET-UP OF EQUIPMENT;VERBAL CUEING

## 2021-07-10 ASSESSMENT — GAIT ASSESSMENTS
GAIT LEVEL OF ASSIST: CONTACT GUARD ASSIST
DISTANCE (FEET): 125
ASSISTIVE DEVICE: NONE;FRONT WHEEL WALKER
DEVIATION: DECREASED BASE OF SUPPORT;BRADYKINETIC;SHUFFLED GAIT;DECREASED HEEL STRIKE
ASSISTIVE DEVICE: 4 WHEEL WALKER
GAIT LEVEL OF ASSIST: CONTACT GUARD ASSIST

## 2021-07-10 ASSESSMENT — PAIN DESCRIPTION - PAIN TYPE: TYPE: ACUTE PAIN

## 2021-07-10 ASSESSMENT — BRIEF INTERVIEW FOR MENTAL STATUS (BIMS)
WHAT MONTH IS IT: ACCURATE WITHIN 5 DAYS
BIMS SUMMARY SCORE: 14
ASKED TO RECALL SOCK: YES, NO CUE REQUIRED
WHAT YEAR IS IT: CORRECT
WHAT DAY OF THE WEEK IS IT: INCORRECT
ASKED TO RECALL BED: YES, NO CUE REQUIRED
INITIAL REPETITION OF BED BLUE SOCK - FIRST ATTEMPT: 3
ASKED TO RECALL BLUE: YES, NO CUE REQUIRED

## 2021-07-10 NOTE — PROGRESS NOTES
"Rehab Progress Note     Interval Events (Subjective)  Peg reports that she has been having some difficulty with taking deep breaths due to pain in the back with taking a deep breath.  Fracture T8.      No abdominal pain.  She would like to get rid of her richards.    Sleep was mediocre.      Objective:  VITAL SIGNS: /72   Pulse 79   Temp 36.7 °C (98 °F) (Oral)   Resp 18   Ht 1.676 m (5' 6\")   Wt 104 kg (229 lb 4.5 oz)   SpO2 92%   BMI 37.01 kg/m²   Gen: Alert and cooperative and in no acute distress  HEENT: oxygen by nasal canula  CV: Regular rate and rhythm.  No pitting edema in the lower extremities bilaterally  Lungs: Decreased breath sounds.  No rales  Abd: soft, nontender, nondistended  Ext: no calf tenderness    Recent Results (from the past 72 hour(s))   CBC WITH DIFFERENTIAL    Collection Time: 07/08/21  7:30 AM   Result Value Ref Range    WBC 6.5 4.8 - 10.8 K/uL    RBC 3.68 (L) 4.20 - 5.40 M/uL    Hemoglobin 9.9 (L) 12.0 - 16.0 g/dL    Hematocrit 31.8 (L) 37.0 - 47.0 %    MCV 86.4 81.4 - 97.8 fL    MCH 26.9 (L) 27.0 - 33.0 pg    MCHC 31.1 (L) 33.6 - 35.0 g/dL    RDW 48.0 35.9 - 50.0 fL    Platelet Count 140 (L) 164 - 446 K/uL    MPV 10.4 9.0 - 12.9 fL    Neutrophils-Polys 75.90 (H) 44.00 - 72.00 %    Lymphocytes 16.40 (L) 22.00 - 41.00 %    Monocytes 6.80 0.00 - 13.40 %    Eosinophils 0.30 0.00 - 6.90 %    Basophils 0.30 0.00 - 1.80 %    Immature Granulocytes 0.30 0.00 - 0.90 %    Nucleated RBC 0.00 /100 WBC    Neutrophils (Absolute) 4.94 2.00 - 7.15 K/uL    Lymphs (Absolute) 1.07 1.00 - 4.80 K/uL    Monos (Absolute) 0.44 0.00 - 0.85 K/uL    Eos (Absolute) 0.02 0.00 - 0.51 K/uL    Baso (Absolute) 0.02 0.00 - 0.12 K/uL    Immature Granulocytes (abs) 0.02 0.00 - 0.11 K/uL    NRBC (Absolute) 0.00 K/uL   Renal Function Panel    Collection Time: 07/08/21  7:30 AM   Result Value Ref Range    Sodium 135 135 - 145 mmol/L    Potassium 4.3 3.6 - 5.5 mmol/L    Chloride 99 96 - 112 mmol/L    Co2 25 20 - 33 " mmol/L    Glucose 90 65 - 99 mg/dL    Creatinine 0.71 0.50 - 1.40 mg/dL    Bun 21 8 - 22 mg/dL    Calcium 8.8 8.5 - 10.5 mg/dL    Phosphorus 3.9 2.5 - 4.5 mg/dL    Albumin 3.8 3.2 - 4.9 g/dL   Basic Metabolic Panel    Collection Time: 07/08/21  7:30 AM   Result Value Ref Range    Anion Gap 11.0 7.0 - 16.0   ESTIMATED GFR    Collection Time: 07/08/21  7:30 AM   Result Value Ref Range    GFR If African American >60 >60 mL/min/1.73 m 2    GFR If Non African American >60 >60 mL/min/1.73 m 2   VITAMIN B12    Collection Time: 07/08/21  7:30 AM   Result Value Ref Range    Vitamin B12 -True Cobalamin 489 211 - 911 pg/mL   TSH WITH REFLEX TO FT4    Collection Time: 07/08/21  7:30 AM   Result Value Ref Range    TSH 1.940 0.380 - 5.330 uIU/mL   SARS-CoV-2 PCR (24 hour In-House): Collect NP swab in VTM    Collection Time: 07/09/21  2:30 PM    Specimen: Nasopharyngeal; Respirate   Result Value Ref Range    SARS-CoV-2 Source NP Swab    CBC with Differential    Collection Time: 07/10/21  5:31 AM   Result Value Ref Range    WBC 4.8 4.8 - 10.8 K/uL    RBC 3.67 (L) 4.20 - 5.40 M/uL    Hemoglobin 10.0 (L) 12.0 - 16.0 g/dL    Hematocrit 31.1 (L) 37.0 - 47.0 %    MCV 84.7 81.4 - 97.8 fL    MCH 27.2 27.0 - 33.0 pg    MCHC 32.2 (L) 33.6 - 35.0 g/dL    RDW 45.9 35.9 - 50.0 fL    Platelet Count 142 (L) 164 - 446 K/uL    MPV 10.4 9.0 - 12.9 fL    Neutrophils-Polys 66.90 44.00 - 72.00 %    Lymphocytes 23.70 22.00 - 41.00 %    Monocytes 6.70 0.00 - 13.40 %    Eosinophils 1.50 0.00 - 6.90 %    Basophils 0.60 0.00 - 1.80 %    Immature Granulocytes 0.60 0.00 - 0.90 %    Nucleated RBC 0.00 /100 WBC    Neutrophils (Absolute) 3.22 2.00 - 7.15 K/uL    Lymphs (Absolute) 1.14 1.00 - 4.80 K/uL    Monos (Absolute) 0.32 0.00 - 0.85 K/uL    Eos (Absolute) 0.07 0.00 - 0.51 K/uL    Baso (Absolute) 0.03 0.00 - 0.12 K/uL    Immature Granulocytes (abs) 0.03 0.00 - 0.11 K/uL    NRBC (Absolute) 0.00 K/uL   Comp Metabolic Panel (CMP)    Collection Time: 07/10/21   5:31 AM   Result Value Ref Range    Sodium 132 (L) 135 - 145 mmol/L    Potassium 3.8 3.6 - 5.5 mmol/L    Chloride 97 96 - 112 mmol/L    Co2 25 20 - 33 mmol/L    Anion Gap 10.0 7.0 - 16.0    Glucose 86 65 - 99 mg/dL    Bun 22 8 - 22 mg/dL    Creatinine 0.64 0.50 - 1.40 mg/dL    Calcium 8.7 8.5 - 10.5 mg/dL    AST(SGOT) 20 12 - 45 U/L    ALT(SGPT) 15 2 - 50 U/L    Alkaline Phosphatase 77 30 - 99 U/L    Total Bilirubin 0.5 0.1 - 1.5 mg/dL    Albumin 3.5 3.2 - 4.9 g/dL    Total Protein 6.5 6.0 - 8.2 g/dL    Globulin 3.0 1.9 - 3.5 g/dL    A-G Ratio 1.2 g/dL   HEMOGLOBIN A1C    Collection Time: 07/10/21  5:31 AM   Result Value Ref Range    Glycohemoglobin 6.3 (H) 4.0 - 5.6 %    Est Avg Glucose 134 mg/dL   TSH with Reflex to FT4    Collection Time: 07/10/21  5:31 AM   Result Value Ref Range    TSH 3.570 0.380 - 5.330 uIU/mL   Vitamin D, 25-hydroxy (blood)    Collection Time: 07/10/21  5:31 AM   Result Value Ref Range    25-Hydroxy   Vitamin D 25 41 30 - 100 ng/mL   ESTIMATED GFR    Collection Time: 07/10/21  5:31 AM   Result Value Ref Range    GFR If African American >60 >60 mL/min/1.73 m 2    GFR If Non African American >60 >60 mL/min/1.73 m 2       Current Facility-Administered Medications   Medication Frequency   • Respiratory Therapy Consult Continuous RT   • oxyCODONE immediate-release (ROXICODONE) tablet 5 mg Q3HRS PRN    Or   • oxyCODONE immediate release (ROXICODONE) tablet 10 mg Q3HRS PRN   • hydrALAZINE (APRESOLINE) tablet 25 mg Q8HRS PRN   • acetaminophen (Tylenol) tablet 650 mg Q4HRS PRN   • senna-docusate (PERICOLACE or SENOKOT S) 8.6-50 MG per tablet 2 tablet BID    And   • polyethylene glycol/lytes (MIRALAX) PACKET 1 Packet QDAY PRN    And   • magnesium hydroxide (MILK OF MAGNESIA) suspension 30 mL QDAY PRN    And   • bisacodyl (DULCOLAX) suppository 10 mg QDAY PRN   • artificial tears ophthalmic solution 1 Drop PRN   • benzocaine-menthol (CEPACOL) lozenge 1 Lozenge Q2HRS PRN   • mag hydrox-al hydrox-simeth  (MAALOX PLUS ES or MYLANTA DS) suspension 20 mL Q2HRS PRN   • ondansetron (ZOFRAN ODT) dispertab 4 mg 4X/DAY PRN    Or   • ondansetron (ZOFRAN) syringe/vial injection 4 mg 4X/DAY PRN   • traZODone (DESYREL) tablet 50 mg QHS PRN   • sodium chloride (OCEAN) 0.65 % nasal spray 2 Spray PRN   • heparin injection 5,000 Units Q8HRS   • atenolol (TENORMIN) tablet 50 mg DAILY   • bacitracin ointment BID   • diazePAM (VALIUM) tablet 5 mg Q6HRS PRN   • gabapentin (NEURONTIN) capsule 100 mg BID   • methocarbamol (ROBAXIN) tablet 750 mg 4X/DAY   • traMADol (ULTRAM) 50 MG tablet 50 mg Q6HRS PRN       Orders Placed This Encounter   Procedures   • Diet Order Diet: Regular     Standing Status:   Standing     Number of Occurrences:   1     Order Specific Question:   Diet:     Answer:   Regular [1]       Assessment:  Active Hospital Problems    Diagnosis    • *Spinal fracture of T8 vertebra (HCC)    • Thrombocytopenia (HCC)    • Anemia    • Debility    • HTN (hypertension)    • Obesity    • Hyponatremia        Medical Decision Making and Plan:    T8 wedge fracture - Mechanical fall out of camper with T8 wedge fracture managed non-operatively. TLSO when EOB or when supine if too painful.   -PT and OT for mobility and ADLs  -Follow-up with NSG     Pain Control - Patient on Gabapentin 100 mg BID and PRN Oxycodone. Also on Robaxin 750 mg QID.  Unchanged.     HTN/Arrhythmia - Patient on Atenolol 50 mg daily      Anemia - Hb/Hct 10/31.1     Thrombocytopenia - 142, continue to monitor     Hyponatremia  - 132, continue to monitor.  Improving.    Hypoxia/Atelectasis vs. Pneumonitis - Encouraged incentive spirometer use.  Oxygen by nasal canula increased to 3L.  WBC within normal today.  Otherwise stable.  Concern for hypoxia, start empiric antibiotics.     Morbid Obesity due to excess calories - Patient with BMI of 37.0 on admission, meeting medical criteria. Dietitian to consult.      DVT Ppx - Patient on Heparin on transfer.      Total time:  35 minutes.  I spent greater than 50% of the time for patient care and coordination on this date, including unit/floor time, and face-to-face time with the patient as per assessment and plan above.    Travis Templeton M.D.

## 2021-07-10 NOTE — THERAPY
"Physical Therapy   Daily Treatment     Patient Name: Peg Louie  Age:  76 y.o., Sex:  female  Medical Record #: [id number]  Today's Date: 7/10/2021     Precautions  Precautions: Fall Risk, Spinal / Back Precautions , TLSO (Thoracolumbosacral orthosis)  Comments: Fall Risk;Spinal / Back Precautions ;TLSO (Thoracolumbosacral orthosis)    Subjective    Pt found in gym, agreeable to PT.     Objective       07/10/21 1331   Precautions   Precautions Fall Risk;Spinal / Back Precautions ;TLSO (Thoracolumbosacral orthosis)   Comments Fall Risk;Spinal / Back Precautions ;TLSO (Thoracolumbosacral orthosis)   Vitals   Pulse Oximetry 94 %   O2 (LPM) 2   Vitals Comments seated post amb   Gait Functional Level of Assist    Gait Level Of Assist Contact Guard Assist   Assistive Device 4 Wheel Walker   Distance (Feet) 125   # of Times Distance was Traveled 2   Deviation Decreased Base Of Support;Bradykinetic;Shuffled Gait;Decreased Heel Strike   Transfer Functional Level of Assist   Bed, Chair, Wheelchair Transfer Stand by Assist  (SPT w/c>bed with bed rail)   Bed Chair Wheelchair Transfer Description Increased time;Adaptive equipment;Set-up of equipment;Supervision for safety  (sit>Sup SBA)   Neuro-Muscular Treatments   Comments W/c swapped 20\"W x20\"H with foam cushion   Interdisciplinary Plan of Care Collaboration   Patient Position at End of Therapy In Bed;Call Light within Reach;Tray Table within Reach;Phone within Reach   PT Total Time Spent   PT Individual Total Time Spent (Mins) 30   PT Charge Group   PT Gait Training 1   PT Therapeutic Activities 1     Gait training: vc's for heel strike, upright posture and inc gait speed.    Assessment    Pt bradykinetic with gait, limited insight into gait quality or posture.    Strengths: Able to follow instructions, Adequate strength, Effective communication skills, Independent prior level of function, Making steady progress towards goals, Pleasant and cooperative, Motivated for " self care and independence, Willingly participates in therapeutic activities  Barriers: Pain, Decreased endurance, Generalized weakness, Impaired balance, Impaired activity tolerance    Plan    10MWT, 6Min walk test, LE & core strengthening, gait outdoors, curb & stair training for community    Passport items to be completed:  Passport items to be completed:  Get in/out of bed safely, in/out of a vehicle, safely use mobility device, walk or wheel around home/community, navigate up and down stairs, show how to get up/down from the ground, ensure home is accessible, demonstrate HEP, complete caregiver training    Physical Therapy Problems (Active)     Problem: Balance     Dates: Start: 07/10/21       Goal: STG-Within one week, patient will maintain dynamic standing perform Harris balance     Dates: Start: 07/10/21             Problem: Mobility     Dates: Start: 07/10/21       Goal: STG-Within one week, patient will ambulate household distance with FWW vs 4WW x100'     Dates: Start: 07/10/21          Goal: STG-Within one week, patient will ambulate up/down a curb with LRAD CGA     Dates: Start: 07/10/21             Problem: Mobility Transfers     Dates: Start: 07/10/21       Goal: STG-Within one week, patient will transfer bed to chair Josh w/c<>bed in room     Dates: Start: 07/10/21             Problem: PT-Long Term Goals     Dates: Start: 07/10/21       Goal: LTG-By discharge, patient will ambulate LRAD x300' Josh indoors     Dates: Start: 07/10/21          Goal: LTG-By discharge, patient will transfer one surface to another Josh with LRAD     Dates: Start: 07/10/21          Goal: LTG-By discharge, patient will perform home exercise program: Josh with amb and strengthening     Dates: Start: 07/10/21          Goal: LTG-By discharge, patient will transfer in/out of a car SPV with LRAD     Dates: Start: 07/10/21

## 2021-07-10 NOTE — THERAPY
Occupational Therapy   Initial Evaluation     Patient Name: Peg Louie  Age:  76 y.o., Sex:  female  Medical Record #: [id number]  Today's Date: 7/10/2021     Subjective    Pt agreeable to OT Eval      Objective       07/10/21 0701   Prior Living Situation   Prior Services Home-Independent   Housing / Facility 1 Story House   Steps Into Home 0   Steps In Home 0   Rail None   Elevator No   Equipment Owned None   Lives with - Patient's Self Care Capacity Adult Children   Comments Pt lives alone in [place], but will be d/cing to daughters house in [place]   Prior Level of ADL Function   Self Feeding Independent   Grooming / Hygiene Independent   Bathing Independent   Dressing Independent   Toileting Independent   Prior Level of IADL Function   Medication Management Independent   Laundry Independent   Kitchen Mobility Independent   Finances Independent   Home Management Independent   Shopping Independent   Prior Level Of Mobility Independent Without Device in Community;Independent Without Device in Home   Driving / Transportation Driving Independent   Occupation (Pre-Hospital Vocational) Employed Full Time   Leisure Interests Other (Comments)  (camping, work)   Prior Functioning: Everyday Activities   Self Care Independent   Indoor Mobility (Ambulation) Independent   Stairs Independent   Functional Cognition Independent   Pain   Pain Scales 0 to 10 Scale    Pain 0 - 10 Group   Location Back   Location Orientation Posterior   Pain Rating Scale (NPRS) 7   Description Aching   Therapist Pain Assessment Post Activity   Cognition    Cognition / Consciousness WDL   Level of Consciousness Alert   ABS (Agitated Behavior Scale)   Agitated Behavior Scale Performed No   Cognitive Pattern Assessment   Cognitive Pattern Assessment Used BIMS   Brief Interview for Mental Status (BIMS)   Repetition of Three Words (First Attempt) 3   Temporal Orientation: Year Correct   Temporal Orientation: Month Accurate within 5 days   Temporal  "Orientation: Day Incorrect   Recall: \"Sock\" Yes, no cue required   Recall: \"Blue\" Yes, no cue required   Recall: \"Bed\" Yes, no cue required   BIMS Summary Score 14   Vision Screen   Vision Not tested  (pt reports no change in vision)   Passive ROM Upper Body   Passive ROM Upper Body WDL   Active ROM Upper Body   Active ROM Upper Body  WDL   Dominant Hand Right   Strength Upper Body   Upper Body Strength  WDL   Sensation Upper Body   Upper Extremity Sensation  WDL   Upper Body Muscle Tone   Upper Body Muscle Tone  WDL   Balance Assessment   Sitting Balance (Static) Fair +   Sitting Balance (Dynamic) Fair +   Standing Balance (Static) Fair -   Standing Balance (Dynamic) Poor +   Weight Shift Sitting Fair   Weight Shift Standing Fair   Bed Mobility    Supine to Sit Contact Guard Assist   Sit to Stand Contact Guard Assist   Scooting Stand by Assist   Coordination Upper Body   Coordination WDL   Eating   Assistance Needed Independent   Physical Assistance Level No physical assistance   CARE Score 6   Eating Discharge Goal   Discharge Goal 6   Oral Hygiene   Assistance Needed Set-up / clean-up   Physical Assistance Level No physical assistance   CARE Score 5   Oral Hygiene Discharge Goal   Discharge Goal 6   Shower/Bathe Self   Assistance Needed Physical assistance   Physical Assistance Level 26%-50%   CARE Score 3   Shower/Bathe Self Discharge Goal   Discharge Goal 4   Upper Body Dressing   Assistance Needed Physical assistance   Physical Assistance Level 26%-50%   CARE Score 3   Upper Body Dressing Discharge Goal   Discharge Goal 6   Lower Body Dressing   Assistance Needed Physical assistance   Physical Assistance Level 76% or more   CARE Score 2   Lower Body Dressing Discharge Goal   Discharge Goal 4   Putting On/Taking Off Footwear   Assistance Needed Physical assistance   Physical Assistance Level Total assistance   CARE Score 1   Putting On/Taking Off Footwear Discharge Goal   Discharge Goal 6   Toileting Hygiene "   Assistance Needed Physical assistance   Physical Assistance Level 51%-75%   CARE Score 2   Toileting Hygiene Discharge Goal   Discharge Goal 6   Toilet Transfer   Assistance Needed Incidental touching   Physical Assistance Level No physical assistance   CARE Score 4   Toilet Transfer Discharge Goal   Discharge Goal 6   Hearing, Speech, and Vision   Expression of Ideas and Wants Without difficulty   Understanding Verbal and Non-Verbal Content Understands   Functional Level of Assist   Eating Independent   Grooming Supervision   Grooming Description Set-up of equipment;Seated in wheelchair at sink   Bathing Moderate Assist   Bathing Description Assit with back;Assit with perineal;Grab bar;Hand held shower;Tub bench;Set-up of equipment;Supervision for safety   Upper Body Dressing Supervision   Upper Body Dressing Description Increased time;Initial preparation for task;Set-up of equipment;Supervision for safety   Lower Body Dressing Maximal Assist   Lower Body Dressing Description Grab bar;Increased time;Initial preparation for task;Set-up of equipment;Supervision for safety   Toileting Moderate Assist   Toileting Description Assist to pull pants up;Assist for hygiene;Grab bar;Set-up of equipment;Supervision for safety;Initial preparation for task;Increased time   Bed, Chair, Wheelchair Transfer Contact Guard Assist   Bed Chair Wheelchair Transfer Description Increased time;Set-up of equipment;Supervision for safety;Initial preparation for task   Toilet Transfers Contact Guard Assist   Toilet Transfer Description Grab bar;Increased time;Set-up of equipment;Supervision for safety;Verbal cueing   Tub / Shower Transfers Contact Guard Assist   Tub Shower Transfer Description Grab bar;Shower bench;Increased time;Initial preparation for task;Requires lift;Set-up of equipment;Supervision for safety   Comprehension Modified Independent   Comprehension Description Glasses   Expression Independent   Problem Solving Independent    Memory Independent   Problem List   Problem List Decreased Active Daily Living Skills;Decreased Homemaking Skills;Decreased Functional Mobility;Decreased Activity Tolerance;Impaired Postural Control / Balance;Limited Knowledge of Post Op Precautions   Precautions   Precautions Fall Risk;Heel Weight Bearing Right Lower Extremity;Spinal / Back Precautions ;TLSO (Thoracolumbosacral orthosis)   Comments TLSO on OOB, knee pain   Current Discharge Plan   Current Discharge Plan Temporarily go to Family /  Friend's Home  (daughters house in [place])   Benefit    Therapy Benefit Patient Would Benefit from Inpatient Rehab Occupational Therapy to Maximize [place] with ADLs, IADLs and Functional Mobility.   Interdisciplinary Plan of Care Collaboration   IDT Collaboration with  Nursing   Patient Position at End of Therapy Seated;Phone within Reach;Tray Table within Reach;Call Light within Reach   Collaboration Comments CLOF, TLSO    Equipment Needs   Assistive Device / DME Shower Chair;Tub Transfer Bench;Grab Bars In Shower / Tub;Grab Bars By Toilet   Adaptive Equipment Reacher;Sock Aide;Dressing Stick;Long Handled Shoe Horn;Leg    Strengths & Barriers   Strengths Able to follow instructions;Alert and oriented;Effective communication skills;Good insight into deficits/needs;Independent prior level of function;Making steady progress towards goals;Motivated for self care and independence;Pleasant and cooperative;Supportive family;Willingly participates in therapeutic activities   Barriers Generalized weakness;Impaired activity tolerance;Impaired balance;Pain;Pain poorly managed   OT Total Time Spent   OT Individual Total Time Spent (Mins) 60   OT Charge Group   OT Evaluation OT Evaluation Low       Assessment  Patient is 76 y.o. female with a diagnosis of T8 vertebra spinal fracture. Pt has past medical history of HTN, obesity, and history of arrhythmia per H&P. Pt was closing her camper door when she fell and hit her head  and back on ground. Pt did not have any spinal surgery and CT was negative. Pt has spinal precautions and a TLSO that is to be on when pt is OOB. Pt lives alone in [place], but will be d/cing to her daughters house in [place]. The house is a single story home with 0 MATT. Pt presented with severe pain throughout eval which limited during ADLs. Pt required supervision to max A to complete all ADLs. Pt is very motivated to increase her independence and return to her home in [place].     Plan  Recommend Occupational Therapy  minutes per day 5-7 days per week for 10-14  days for the following treatments:  OT Group Therapy, OT Self Care/ADL, OT Cognitive Skill Dev, OT Community Reintegration, OT Manual Ther Technique, OT Neuro Re-Ed/Balance, OT Sensory Int Techniques, OT Therapeutic Activity, OT Evaluation and OT Therapeutic Exercise.    Passport items to be completed:  Perform bathroom transfers, complete dressing, complete feeding, get ready for the day, prepare a simple meal, participate in household tasks, adapt home for safety needs, demonstrate home exercise program, complete caregiver training     Goals:  Long term and short term goals have been discussed with patient and they are in agreement.        Occupational Therapy Goals (Active)     Problem: Bathing     Dates: Start: 07/10/21       Goal: STG-Within one week, patient will bathe with supervision using AE and DME as needed.     Dates: Start: 07/10/21             Problem: Dressing     Dates: Start: 07/10/21       Goal: STG-Within one week, patient will dress LB with min A using AE as needed.     Dates: Start: 07/10/21             Problem: Functional Transfers     Dates: Start: 07/10/21       Goal: STG-Within one week, patient will transfer to toilet with supervision.     Dates: Start: 07/10/21             Problem: OT Long Term Goals     Dates: Start: 07/10/21       Goal: LTG-By discharge, patient will complete basic self care tasks with mod I to supervision.       Dates: Start: 07/10/21          Goal: LTG-By discharge, patient will perform bathroom transfers with mod I to supervision.     Dates: Start: 07/10/21             Problem: Toileting     Dates: Start: 07/10/21       Goal: STG-Within one week, patient will complete toileting tasks with min A.     Dates: Start: 07/10/21

## 2021-07-10 NOTE — THERAPY
"Occupational Therapy  Daily Treatment     Patient Name: Peg Louie  Age:  76 y.o., Sex:  female  Medical Record #: [id number]  Today's Date: 7/10/2021     Precautions  Precautions: (P) Fall Risk, Spinal / Back Precautions , TLSO (Thoracolumbosacral orthosis)  Comments: (P) TLSO on OOB, knee pain         Subjective  \"Can we organize my closet?\"     Objective       07/10/21 1301   Precautions   Precautions Fall Risk;Spinal / Back Precautions ;TLSO (Thoracolumbosacral orthosis)   Comments TLSO on OOB, knee pain   Vitals   Pulse 90   Pulse Oximetry 97 %   O2 (LPM) 3   O2 Delivery Device Nasal Cannula   Functional Level of Assist   Bed, Chair, Wheelchair Transfer Stand by Assist   Bed Chair Wheelchair Transfer Description Adaptive equipment;Set-up of equipment;Supervision for safety;Verbal cueing;Increased time  (FWW)   Sitting Upper Body Exercises   Upper Extremity Bike Level 3 Resistance  (5 minutes, motomed)   Interdisciplinary Plan of Care Collaboration   Patient Position at End of Therapy Seated;Other (Comments)  (in gym for PT)   OT Total Time Spent   OT Individual Total Time Spent (Mins) 30   OT Charge Group   OT Therapy Activity 1   OT Therapeutic Exercise  1   Pt participated in the IADL of organizing her room and closet. Pt required verbal cues to maintain spinal precautions while unloading her suitcase. Pt was oriented to therapy space and IADL suite. Pt was educated on purpose and goals of OT and therapy in general.     Assessment    Pt tolerated session well focused on home management, orientation to rehab hospital, and endurance. Pt demonstrated increased independence when transferring with FWW from bed>w/c. Pt is motivated to increase her strength and endurance.   Strengths: Able to follow instructions, Alert and oriented, Effective communication skills, Good insight into deficits/needs, Independent prior level of function, Making steady progress towards goals, Motivated for self care and " independence, Pleasant and cooperative, Supportive family, Willingly participates in therapeutic activities  Barriers: Generalized weakness, Impaired activity tolerance, Impaired balance, Pain, Pain poorly managed    Plan    Practice AE for LB dressing, endurance, UE strengthening, balance, functional transfers/mobility    Passport items to be completed:  Perform bathroom transfers, complete dressing, complete feeding, get ready for the day, prepare a simple meal, participate in household tasks, adapt home for safety needs, demonstrate home exercise program, complete caregiver training     Occupational Therapy Goals (Active)     Problem: Bathing     Dates: Start: 07/10/21       Goal: STG-Within one week, patient will bathe with supervision using AE and DME as needed.     Dates: Start: 07/10/21             Problem: Dressing     Dates: Start: 07/10/21       Goal: STG-Within one week, patient will dress LB with min A using AE as needed.     Dates: Start: 07/10/21             Problem: Functional Transfers     Dates: Start: 07/10/21       Goal: STG-Within one week, patient will transfer to toilet with supervision.     Dates: Start: 07/10/21             Problem: OT Long Term Goals     Dates: Start: 07/10/21       Goal: LTG-By discharge, patient will complete basic self care tasks with mod I to supervision.      Dates: Start: 07/10/21          Goal: LTG-By discharge, patient will perform bathroom transfers with mod I to supervision.     Dates: Start: 07/10/21             Problem: Toileting     Dates: Start: 07/10/21       Goal: STG-Within one week, patient will complete toileting tasks with min A.     Dates: Start: 07/10/21

## 2021-07-10 NOTE — FLOWSHEET NOTE
07/09/21 1811   Patient History   Pulmonary Diagnosis Old Facial/sinus injury causes difficulty breathing.   Procedures Relevant to Respiratory Status None   Home O2 No   Nocturnal CPAP No  (Used CPAP in past, not current user.)   Home Treatments/Frequency No   Protocol Pathways   Protocol Pathways None

## 2021-07-10 NOTE — H&P
"REHABILITATION HISTORY AND PHYSICAL/POST ADMISSION PHYSICAL EVALUATION    Date of Admission: 7/9/2021  Peg Louie  RH05/02    [facility] Code / Diagnosis to Support: 0008.9 - Orthopaedic Disorders: Other Orthopaedic  Etiologic Diagnosis: Spinal fracture of T8 vertebra (HCC)    CC: Back pain, knee pain    HPI:  The patient is a 76 y.o. female with a past medical history of HTN, obesity, and history of arrhythmia who presented on 7/6/21 as transfer from OSH for new T8 wedge fracture.  Patient was reportedly closing her camper door when she feel out and hit her head and back on the ground. She was found to have T8 wedge fracture with greater than 50% height loss and fracture of the tip of T7 spinous process. Patient was transferred to [facility] for higher level of care.  NSG was consulted which recommended conservative management as she had no neurologic compromise.  They recommended TLSO and follow-up in 6 weeks. TLSO to be placed when EOB. CT head and CT C spine were negative for acute process. Patient's hospital stay complicated by anemia, thrombocytopenia, and labile SBP.     Patient tolerated transfer to [facility]. She reports she is doing well but embarrassed about her fall. She reports she is visiting from [place]. She denies pain at rest, but has mid back pain when doing activities. She is also limited by her right knee OA. She reports it is \"bone on bone.\" She denies NVD. Denies SOB, mask around neck. Denies fever or chills. Denies changes in strength in LEs.       REVIEW OF SYSTEMS:     Comprehensive 14 point ROS was reviewed and all were negative except as noted elsewhere in this document.     PMH:  Past Medical History:   Diagnosis Date   • Irregular heart beat        PSH:  Past Surgical History:   Procedure Laterality Date   • CATARACT EXTRACTION WITH IOL      both eyes   • OTHER ORTHOPEDIC SURGERY      left ankle and bilateral hand surgeries   • PRIMARY C SECTION      x 1       FAMILY HISTORY:  No family history " on file.     MEDICATIONS:  Current Facility-Administered Medications   Medication Dose   • Respiratory Therapy Consult     • Pharmacy Consult Request ...Pain Management Review 1 Each  1 Each   • oxyCODONE immediate-release (ROXICODONE) tablet 5 mg  5 mg    Or   • oxyCODONE immediate release (ROXICODONE) tablet 10 mg  10 mg   • hydrALAZINE (APRESOLINE) tablet 25 mg  25 mg   • acetaminophen (Tylenol) tablet 650 mg  650 mg   • senna-docusate (PERICOLACE or SENOKOT S) 8.6-50 MG per tablet 2 tablet  2 tablet    And   • polyethylene glycol/lytes (MIRALAX) PACKET 1 Packet  1 Packet    And   • magnesium hydroxide (MILK OF MAGNESIA) suspension 30 mL  30 mL    And   • bisacodyl (DULCOLAX) suppository 10 mg  10 mg   • artificial tears ophthalmic solution 1 Drop  1 Drop   • benzocaine-menthol (CEPACOL) lozenge 1 Lozenge  1 Lozenge   • mag hydrox-al hydrox-simeth (MAALOX PLUS ES or MYLANTA DS) suspension 20 mL  20 mL   • ondansetron (ZOFRAN ODT) dispertab 4 mg  4 mg    Or   • ondansetron (ZOFRAN) syringe/vial injection 4 mg  4 mg   • traZODone (DESYREL) tablet 50 mg  50 mg   • sodium chloride (OCEAN) 0.65 % nasal spray 2 Spray  2 Spray   • heparin injection 5,000 Units  5,000 Units   • acetaminophen (Tylenol) tablet 650 mg  650 mg   • [START ON 7/10/2021] atenolol (TENORMIN) tablet 50 mg  50 mg   • bacitracin ointment     • diazePAM (VALIUM) tablet 5 mg  5 mg   • gabapentin (NEURONTIN) capsule 100 mg  100 mg   • methocarbamol (ROBAXIN) tablet 750 mg  750 mg   • traMADol (ULTRAM) 50 MG tablet 50 mg  50 mg       ALLERGIES:  Gluten meal    PSYCHOSOCIAL HISTORY:  Housing / Facility: 1 Story House (daughters home in [place])  Steps Into Home: 0  Lives with - Patient's Self Care Capacity: Alone and Able to Care For Self  Equipment Owned: None     Prior Level of Function / Living Situation:   Physical Therapy: Prior Services: Home-Independent  Housing / Facility: 1 [address] House (daughters home in [place])  Steps Into Home: 0  Bathroom Set up:  Bathtub / Shower Combination  Equipment Owned: None  Lives with - Patient's Self Care Capacity: Alone and Able to Care For Self  Bed Mobility: Independent  Transfer Status: Independent  Ambulation: Independent  Distance Ambulation (Feet):  (community)  Assistive Devices Used: None  Stairs: Independent  Current Level of Function:   Gait Level Of Assist: Minimal Assist  Assistive Device: Front Wheel Walker  Distance (Feet): 10  Deviation: Antalgic, Shuffled Gait, Bradykinetic (unstable, O2 desat 82% with gait, rebounds < 1 min)  # of Stairs Climbed: 0  Supine to Sit: Maximal Assist  Sit to Supine: Maximal Assist  Scooting: Minimal Assist  Rolling: Maximal Assist to Rt., Maximum Assist to Lt.  Skilled Intervention: Verbal Cuing, Compensatory Strategies  Comments: found at EOB and left in txf chair  Sit to Stand: Minimal Assist (x2)  Bed, Chair, Wheelchair Transfer: Minimal Assist  Toilet Transfers:  (reports min A to [facility] earlier today)  Transfer Method: Stand Step  Skilled Intervention: Verbal Cuing, Tactile Cuing, Compensatory Strategies, Facilitation  Sitting in Chair: NT  Sitting Edge of Bed: >25  Standing: <5  Occupational Therapy:   Self Feeding: Independent  Grooming / Hygiene: Independent  Bathing: Independent  Dressing: Independent  Toileting: Independent  Medication Management: Independent  Laundry: Independent  Kitchen Mobility: Independent  Finances: Independent  Home Management: Independent  Shopping: Independent  Prior Level Of Mobility: Independent Without Device in Home, Independent Without Device in Community  Driving / Transportation: Driving Independent  Prior Services: Home-Independent  Housing / Facility: [address] (daughters home in [place])  Occupation (Pre-Hospital Vocational): Employed Full Time, Requires Sitting, Desk, Computer Tasks  Current Level of Function:   Eating: Supervision (seated drinking)  Bathing: Maximal Assist (use of washcloths for hilary area)  Upper Body Dressing: Maximal Assist  "(TLSO )  Lower Body Dressing: Maximal Assist (socks)  Toileting:  (per dtr, pt sat on BSC/toileting w/ A for pericare/safety)  Skilled Intervention: Verbal Cuing, Tactile Cuing, Facilitation, Compensatory Strategies  Comments: limited by arrival of transport    CURRENT LEVEL OF FUNCTION:   Same as level of function prior to admission to [facility]    PHYSICAL EXAM:     VITAL SIGNS:   height is 1.676 m (5' 6\") and weight is 104 kg (229 lb 4.5 oz). Her temporal temperature is 36.6 °C (97.9 °F). Her blood pressure is 125/70 and her pulse is 70. Her respiration is 18 and oxygen saturation is 92%.     GENERAL: No apparent distress  HEENT: Normocephalic/atraumatic and EOMI  CARDIAC: Regular rate and rhythm, normal S1, S2   LUNGS: Clear to auscultation   ABDOMINAL: bowel sounds present    EXTREMITIES: no contractures, spasticity, or edema. Joint effusion to right knee, enlarged bony prominences  NEURO:  Mental status:  A&Ox4 (person, place, date, situation) answers questions appropriately  Speech: fluent, no aphasia or dysarthria  Motor: Pain limited in LEs  5/5 BUE  5/5 B HF  4-/5 R KE, 5/5 LKE  4/5 ADF/APF  Sensory: intact to light touch through out      RADIOLOGY:       XR T spine 7/8/21  IMPRESSION:     1.  Severe compression of T8 vertebral body as seen previously, with associated kyphosis.  2.  Probable posterior 8th rib fracture, side is difficult to determine.  3.  Moderate multilevel degenerative change.                   LABS:  Recent Labs     07/07/21  0440 07/08/21  0730   SODIUM 130* 135   POTASSIUM 4.1 4.3   CHLORIDE 98 99   CO2 24 25   GLUCOSE 108* 90   BUN 19 21   CREATININE 0.73 0.71   CALCIUM 8.8 8.8     Recent Labs     07/07/21  0440 07/08/21  0730   WBC 6.5 6.5   RBC 3.80* 3.68*   HEMOGLOBIN 10.3* 9.9*   HEMATOCRIT 32.6* 31.8*   MCV 85.8 86.4   MCH 27.1 26.9*   MCHC 31.6* 31.1*   RDW 48.9 48.0   PLATELETCT 152* 140*   MPV 9.9 10.4             PRIMARY REHAB DIAGNOSIS:    This patient " is a 76 y.o. female admitted for acute inpatient rehabilitation with Spinal fracture of T8 vertebra (HCC).    IMPAIRMENTS:   ADLs/IADLs  Mobility    SECONDARY DIAGNOSIS/MEDICAL CO-MORBIDITIES AFFECTING FUNCTION:  HTN  Arrhythmia  Anemia  Thrombocytopenia  Hyponatremia     RELEVANT CHANGES SINCE PREADMISSION EVALUATION:    Status unchanged    The patient's rehabilitation potential is Good  The patient's medical prognosis is good    PLAN:   Discussion and Recommendations:   1. The patient requires an acute inpatient rehabilitation program with a coordinated program of care at an intensity and frequency not available at a lower level of care. This recommendation is substantiated by the patient's medical physicians who recommend that the patient's intervention and assessment of medical issues needs to be done at an acute level of care for patient's safety and maximum outcome.   2. A coordinated program of care will be supplied by an interdisciplinary team of physical therapy, occupational therapy, rehab physician, rehab nursing, and, if needed, speech therapy and rehab psychology. Rehab team presents a patient-specific rehabilitation and education program concentrating on prevention of future problems related to accessibility, mobility, skin, bowel, bladder, sexuality, and psychosocial and medical/surgical problems.   3. Need for Rehabilitation Physician: The rehab physician will be evaluating the patient on a multi-weekly basis to help coordinate the program of care. The rehab physician communicates between medical physicians, therapists, and nurses to maximize the patient's potential outcome. Specific areas in which the rehab physician will be providing daily assessment include the following:   A. Assessing the patient's heart rate and blood pressure response (vitals monitoring) to activity and making adjustments in medications or conservative measures as needed.   B. The rehab physician will be assessing the  frequency at which the program can be increased to allow the patient to reach optimal functional outcome.   C. The rehab physician will also provide assessments in daily skin care, especially in light of patient's impairments in mobility.   D. The rehab physician will provide special expertise in understanding how to work with functional impairment and recommend appropriate interventions, compensatory techniques, and education that will facilitate the patient's outcome.   4. Rehab R.N.   The rehab RN will be working with patient to carry over in room mobility and activities of daily living when the patient is not in 3 hours of skilled therapy. Rehab nursing will be working in conjunction with rehab physician to address all the medical issues above and continue to assess laboratory work and discuss abnormalities with the treating physicians, assess vitals, and response to activity, and discuss and report abnormalities with the rehab physician. Rehab RN will also continue daily skin care, supervise bladder/bowel program, instruct in medication administration, and ensure patient safety.   5. Rehab Therapy: Therapies to treat at intensity and frequency of (may change after completion of evaluation by all therapeutic disciplines):       PT:  Physical therapy to address mobility, transfer, gait training and evaluation for adaptive equipment needs 1 and 1/2 hour/day at least 5 days/week for the duration of the ELOS (see below)       OT:  Occupational therapy to address ADLs, self-care, home management training, functional mobility/transfers and assistive device evaluation, and community re-integration 1 and 1/2 hour/day at least 5 days/week for the duration of the ELOS (see below).       Medical management / Rehabilitation Issues/ Adverse Potential as part of rehabilitation plan     REHABILITATION ISSUES/ADVERSE POTENTIAL:  1.  T8 wedge fracture - Patient with mechanical fall out of camper with T8 wedge fracture managed  non-operatively. Patient demonstrates functional deficits in strength, balance, coordination, and ADL's. Patient is admitted to [facility] for comprehensive rehabilitation therapy as described below.   Rehabilitation nursing monitors bowel and bladder control, educates on medication administration, co-morbidities and monitors patient safety.    2.  Neurostimulants: None at this time but continue to assess daily for need to initiate should status change.    3.  DVT prophylaxis:  Patient is on Heparin for anticoagulation upon transfer. Encourage OOB. Monitor daily for signs and symptoms of DVT including but not limited to swelling and pain to prevent the development of DVT that may interfere with therapies.    4.  GI prophylaxis:  On prilosec to prevent gastritis/dyspepsia which may interfere with therapies.    5.  Pain: No issues with pain currently / Controlled with APAP/Tramadol/Gabapentin    6.  Nutrition/Dysphagia: Dietician monitors nutrient intake, recommend supplements prn and provide nutrition education to pt/family to promote optimal nutrition for wound healing/recovery.     7.  Bladder/bowel:  Start bowel and bladder program as described below, to prevent constipation, urinary retention (which may lead to UTI), and urinary incontinence (which will impact upon pt's functional independence).   - Post void bladder scans, I&O cath for PVRs >400  - up to commode after meal     8.  Skin/dermal ulcer prophylaxis: Monitor for new skin conditions with q.2 h. turns as required to prevent the development of skin breakdown.     9.  Cognition/Behavior: As needed psychologist provides adjustment counseling to illness and psychosocial barriers that may be potential barriers to rehabilitation.     10. Respiratory therapy: RT performs O2 management prn, breathing retraining, pulmonary hygiene and bronchospasm management prn to optimize participation in therapies.     MEDICAL CO-MORBIDITIES/ADVERSE  POTENTIAL AFFECTING FUNCTION:   T8 wedge fracture - Patient with mechanical fall out of [place] [employer] with T8 wedge fracture managed non-operatively. TLSO when EOB or when supine if too painful.   -PT and OT for mobility and ADLs  -Follow-up with NSG    Pain Control - Patient on Gabapentin 100 mg BID and PRN Oxycodone. Also on Robaxin 750 mg QID    HTN/Arrhythmia - Patient on Atenolol 50 mg daily     Anemia - Check AM CBC    Thrombocytopenia - Check AM CBC    Hyponatremia  - Check AM CMP    Morbid Obesity due to excess calories - Patient with BMI of 37.0 on admission, meeting medical criteria. Dietitian to consult.     DVT Ppx - Patient on Heparin on transfer.     I personally performed a complete drug regimen review and no potential clinically significant medication issues were identified.     Pt was seen today for 72 min, and entire time spent in face-to-face contact was >50% in counseling and coordination of care as detailed in A/P above.        GOALS/EXPECTED LEVEL OF FUNCTION BASED ON CURRENT MEDICAL AND FUNCTIONAL STATUS (may change based on patient's medical status and rate of impairment recovery):  Transfers:   Modified Independent  Mobility/Gait:   Modified Independent  ADL's:   Modified Independent    DISPOSITION: Discharge to pre-morbid independent living setting with the supportive care of patient's family.    ELOS: 10-14 days

## 2021-07-10 NOTE — CARE PLAN
Problem: Knowledge Deficit - Standard  Goal: Patient and family/care givers will demonstrate understanding of plan of care, disease process/condition, diagnostic tests and medications  Outcome: Progressing     Problem: Pain - Standard  Goal: Alleviation of pain or a reduction in pain to the patient’s comfort goal  Outcome: Progressing

## 2021-07-10 NOTE — CARE PLAN
Problem: Neurogenic Bowel  Goal: Patient will perform adequate hygiene with incontinent episodes  Note: LBM - 7/9. Bowel meds given as scheduled. Denies s/s of constipation     Problem: Pain - Standard  Goal: Alleviation of pain or a reduction in pain to the patient’s comfort goal  Note: Medicated pt with oxycodone for back pain with minimal relief. Encouraged deep breathing exercised when repositioning self in bed. Maintained room conducive to sleep and rest.

## 2021-07-10 NOTE — FLOWSHEET NOTE
07/10/21 1133   Events/Summary/Plan   Events/Summary/Plan SpO2 check   Vital Signs   Pulse 78   Respiration 16   Pulse Oximetry 93 %   $ Pulse Oximetry (Spot Check) Yes   Breath Sounds   RUL Breath Sounds Clear   RML Breath Sounds Diminished   RLL Breath Sounds Diminished   THANIA Breath Sounds Clear   LLL Breath Sounds Diminished   Secretions   Cough Non Productive   Oxygen   O2 (LPM) 3   O2 Delivery Device Nasal Cannula   Room Air Challenge Fail  (Room air SpO2=80%)

## 2021-07-10 NOTE — PROGRESS NOTES
2 RN skin check done with admitting RN Chanel and ANGELA Martinez Face photo and skin photos documented in media. Appropriate LDAs opened.   Pt with Rudy score of 18, RN wound protocol ordered on 7/9/21, orders current. Prevention measures in place including, encouraged to turn to sides, noted reddenned areas to bilateral sacral areas & right knee and leg abrasions open to air.  Forehead abrasions scabbed..

## 2021-07-10 NOTE — FLOWSHEET NOTE
07/09/21 1802   Events/Summary/Plan   Events/Summary/Plan RT Assessment   Vital Signs   Pulse 84   Respiration 16   Pulse Oximetry 92 %   $ Pulse Oximetry (Spot Check) Yes   Respiratory Assessment   Level of Consciousness Alert   Chest Exam   Work Of Breathing / Effort Within Normal Limits   Breath Sounds   RUL Breath Sounds Clear   RML Breath Sounds Clear   RLL Breath Sounds Clear   THANIA Breath Sounds Clear   LLL Breath Sounds Clear   Secretions   Cough Non Productive   Oxygen   O2 (LPM) 2   O2 Delivery Device Oxymask   Smoking History   Have you ever smoked Yes   Have you smoked in the last 12 months No   Confirm Quit Date 01/09/09

## 2021-07-10 NOTE — THERAPY
"Physical Therapy   Initial Evaluation     Patient Name: Peg Louie  Age:  76 y.o., Sex:  female  Medical Record #: [id number]  Today's Date: 7/10/2021     Subjective    Pt found up in w/c, \"I'm really hurting and I need my heart pills, my heart is fluttering.\"  RN notified and meds given.  \"I haven't had this fluttering since I started taking these pills, once I started menopause.\"    When discussing hear palpitations at end of session pt reporting \"It usually goes away within 5min of taking the pills.\"     Objective       07/10/21 0831   Prior Living Situation   Prior Services Home-Independent   Housing / Facility 1 Story House   Steps Into Home 0   Steps In Home 0   Equipment Owned None   Lives with - Patient's Self Care Capacity Adult Children   Comments Pt will d/c to dtr's home, which is 1 SH, No MATT   Prior Level of Functional Mobility   Bed Mobility Independent   Transfer Status Independent   Ambulation Independent   Distance Ambulation (Feet) 1000   Assistive Devices Used None   Stairs Independent   Prior Functioning: Everyday Activities   Self Care Independent   Indoor Mobility (Ambulation) Independent   Stairs Independent   Functional Cognition Independent   Prior Device Use None of the given options   Pain 0 - 10 Group   Therapist Pain Assessment Prior to Activity;During Activity;7;Nurse Notified  (meds given)   Passive ROM Lower Body   Passive ROM Lower Body WDL   Comments L ankle limited d/t hx of ankle fx, but WFL   Active ROM Lower Body    Active ROM Lower Body  WDL   Strength Lower Body   Lower Body Strength  X   Rt Hip Flexion Strength 3 (F)   Lt Hip Flexion Strength 3 (F)   Lt Ankle Dorsiflexion Strength 4- (G-)  (within AROM)   Comments Grossly 5/5 in seated position   Sensation Lower Body   Lower Extremity Sensation   WDL   Comments intact to light touch and proprioception, extinction intact   Lower Body Muscle Tone   Lower Body Muscle Tone  WDL   Balance Assessment   Sitting Balance " (Static) Good   Sitting Balance (Dynamic) Fair +   Standing Balance (Static) Fair -   Standing Balance (Dynamic) Poor +   Weight Shift Sitting Fair   Weight Shift Standing Fair   Bed Mobility    Sit to Supine Contact Guard Assist   Sit to Stand Contact Guard Assist   Scooting Stand by Assist   Neurological Concerns   Neurological Concerns No   Comments Seated and standing posture: incr kyphosis, forward head, forward trunk flex   Coordination Lower Body    Coordination Lower Body  Not Applicable   Roll Left and Right   Assistance Needed Verbal cues   Physical Assistance Level No physical assistance   CARE Score 4   Roll Left and Right Discharge Goal   Discharge Goal 6   Sit to Lying   Assistance Needed Supervision   Physical Assistance Level No physical assistance   CARE Score 4   Sit to Lying Discharge Goal   Discharge Goal 6   Lying to Sitting on Side of Bed   Assistance Needed Supervision   Physical Assistance Level No physical assistance   CARE Score 4   Lying to Sitting on Side of Bed Discharge Goal   Discharge Goal 6   Sit to Stand   Assistance Needed Physical assistance   Physical Assistance Level 25% or less   CARE Score 3   Sit to Stand Discharge Goal   Discharge Goal 6   Chair/Bed-to-Chair Transfer   Assistance Needed Incidental touching   Physical Assistance Level No physical assistance   CARE Score 4   Chair/Bed-to-Chair Transfer Discharge Goal   Discharge Goal 6   Car Transfer   Reason if not Attempted Environmental limitations   CARE Score 10   Car Transfer Discharge Goal   Discharge Goal 5   Walk 10 Feet   Assistance Needed Incidental touching   Physical Assistance Level 25% or less   CARE Score 3   Walk 10 Feet Discharge Goal   Discharge Goal 6   Walk 50 Feet with Two Turns   Comment unable d/t pain   Reason if not Attempted Safety concerns   CARE Score 88   Walk 50 Feet with Two Turns Discharge Goal   Discharge Goal 6   Walk 150 Feet   Comment unable d/t pain   Reason if not Attempted Safety  concerns   CARE Score 88   Walk 150 Feet Discharge Goal   Discharge Goal 6   Walking 10 Feet on Uneven Surfaces   Reason if not Attempted Safety concerns   CARE Score 88   Walking 10 Feet on Uneven Surfaces Discharge Goal   Discharge Goal 6   1 Step (Curb)   Comment limited by admit precautions   Reason if not Attempted Environmental limitations   CARE Score 10   1 Step (Curb) Discharge Goal   Discharge Goal 4   4 Steps   Comment limited by admit precautions   Reason if not Attempted Environmental limitations   CARE Score 10   4 Steps Discharge Goal   Discharge Goal 4   12 Steps   Comment limited by admit precautions   Reason if not Attempted Environmental limitations   CARE Score 10   12 Steps Discharge Goal   Discharge Goal 4   Picking Up Object   Reason if not Attempted Safety concerns   CARE Score 88   Picking Up Object Discharge Goal   Discharge Goal 5   Wheel 50 Feet with Two Turns   Assistance Needed Verbal cues;Incidental touching   Physical Assistance Level 25% or less   CARE Score 3   Type of Wheelchair/Scooter Manual   Wheel 150 Feet   Comment Pt is ambulatory   Reason if not Attempted Activity not applicable   CARE Score 9   Wheel 150 Feet Discharge Goal   Discharge Goal 9   Gait Functional Level of Assist    Gait Level Of Assist Contact Guard Assist   Assistive Device None;Front Wheel Walker   Distance (Feet)   (x10' w/ no AD CGA; x50' with FWW CGA)   # of Times Distance was Traveled 2   Deviation Decreased Base Of Support;Bradykinetic;Decreased Heel Strike;Shuffled Gait   Wheelchair Functional Level of Assist   Wheelchair Assist Minimal Assist   Distance Wheelchair (Feet or Distance) 50   Wheelchair Description Extra time;Assistance with steering;Limited by fatigue;Impaired coordination;Verbal cueing;Safety concerns;Supervision for safety   Stairs Functional Level of Assist   Level of Assist with Stairs Unable to Participate   Transfer Functional Level of Assist   Bed, Chair, Wheelchair Transfer  Contact Guard Assist   Bed Chair Wheelchair Transfer Description Increased time;Set-up of equipment;Initial preparation for task;Supervision for safety;Verbal cueing   Toilet Transfers Contact Guard Assist   Toilet Transfer Description Increased time;Set-up of equipment;Verbal cueing   Problem List    Problems Pain;Impaired Bed Mobility;Impaired Transfers;Impaired Ambulation;Decreased Activity Tolerance   Precautions   Precautions Fall Risk;Spinal / Back Precautions ;TLSO (Thoracolumbosacral orthosis)   Comments TLSO on OOB   Current Discharge Plan   Current Discharge Plan Temporarily go to Family /  Friend's Home   Interdisciplinary Plan of Care Collaboration   IDT Collaboration with  Occupational Therapist   Patient Position at End of Therapy In Bed;Call Light within Reach;Tray Table within Reach;Phone within Reach   Collaboration Comments re: barriers, CLOF   Benefit   Therapy Benefit Patient Would Benefit from Inpatient Rehabilitation Physical Therapy to Maximize Functional [place] with ADLs, IADLs and Mobility.   Strengths & Barriers   Strengths Able to follow instructions;Adequate strength;Effective communication skills;Independent prior level of function;Making steady progress towards goals;Pleasant and cooperative;Motivated for self care and independence;Willingly participates in therapeutic activities   Barriers Pain;Decreased endurance;Generalized weakness;Impaired balance;Impaired activity tolerance   PT Total Time Spent   PT Individual Total Time Spent (Mins) 60   PT Charge Group   PT Therapeutic Activities 1   PT Evaluation PT Evaluation Mod   *Addend to flowsheet above  Reflexes: 1+ equal bilaterally    Pt requesting indep in w/c for toilet transfers. Trialed pt performing w/c mobility>toilet<>SPT SBA with grab bar. Required Eze for w/c mobility/safety with turning and managing locking brakes.   Reinforced pt needing SPV at this time.      Gait with no AD: shuffled gait, short step lengths, and  inc trunk flex x10'.  Gait with FWW x 50' CGA increased adele, incr gait speed and decr pain.    Assessment  Patient is 76 y.o. female with a diagnosis of T8 wedge fx with 50% height loss and fracture of T7 spinous process, TLSO for 6 weeks resulting from fall from her trailer/van when turning to close the door, she fell back peoples onto groun.  Additional factors influencing patient status / progress (ie: cognitive factors, co-morbidities, social support, etc): HTN, obesity, hx of arrhythmia, lives in [place], supportive dtr in [place], single LH/no MATT.      Plan  Recommend Physical Therapy  minutes per day 5-7 days per week for 10days-2 weeks for the following treatments:  PT E Stim Attended, PT Gait Training, PT Therapeutic Exercises, PT TENS Application, PT Neuro Re-Ed/Balance, PT Aquatic Therapy and PT Manual Therapy.    Passport items to be completed:  Passport items to be completed:  Get in/out of bed safely, in/out of a vehicle, safely use mobility device, walk or wheel around home/community, navigate up and down stairs, show how to get up/down from the ground, ensure home is accessible, demonstrate HEP, complete caregiver training    Goals:  Long term and short term goals have been discussed with patient and they are in agreement.    Physical Therapy Problems (Active)     Problem: Balance     Dates: Start: 07/10/21       Goal: STG-Within one week, patient will maintain dynamic standing perform Harris balance     Dates: Start: 07/10/21             Problem: Mobility     Dates: Start: 07/10/21       Goal: STG-Within one week, patient will ambulate household distance with FWW vs 4WW x100'     Dates: Start: 07/10/21          Goal: STG-Within one week, patient will ambulate up/down a curb with LRAD CGA     Dates: Start: 07/10/21             Problem: Mobility Transfers     Dates: Start: 07/10/21       Goal: STG-Within one week, patient will transfer bed to chair Josh w/c<>bed in room     Dates: Start: 07/10/21              Problem: PT-Long Term Goals     Dates: Start: 07/10/21       Goal: LTG-By discharge, patient will ambulate LRAD x300' Josh indoors     Dates: Start: 07/10/21          Goal: LTG-By discharge, patient will transfer one surface to another Josh with LRAD     Dates: Start: 07/10/21          Goal: LTG-By discharge, patient will perform home exercise program: Josh with amb and strengthening     Dates: Start: 07/10/21          Goal: LTG-By discharge, patient will transfer in/out of a car SPV with LRAD     Dates: Start: 07/10/21

## 2021-07-11 PROCEDURE — 700111 HCHG RX REV CODE 636 W/ 250 OVERRIDE (IP): Performed by: PHYSICAL MEDICINE & REHABILITATION

## 2021-07-11 PROCEDURE — 700111 HCHG RX REV CODE 636 W/ 250 OVERRIDE (IP)

## 2021-07-11 PROCEDURE — 770010 HCHG ROOM/CARE - REHAB SEMI PRIVAT*

## 2021-07-11 PROCEDURE — 700102 HCHG RX REV CODE 250 W/ 637 OVERRIDE(OP): Performed by: PHYSICAL MEDICINE & REHABILITATION

## 2021-07-11 PROCEDURE — A9270 NON-COVERED ITEM OR SERVICE: HCPCS | Performed by: PHYSICAL MEDICINE & REHABILITATION

## 2021-07-11 PROCEDURE — 97535 SELF CARE MNGMENT TRAINING: CPT

## 2021-07-11 PROCEDURE — 94760 N-INVAS EAR/PLS OXIMETRY 1: CPT

## 2021-07-11 PROCEDURE — 700105 HCHG RX REV CODE 258: Performed by: PHYSICAL MEDICINE & REHABILITATION

## 2021-07-11 PROCEDURE — 99232 SBSQ HOSP IP/OBS MODERATE 35: CPT | Performed by: PHYSICAL MEDICINE & REHABILITATION

## 2021-07-11 PROCEDURE — 97530 THERAPEUTIC ACTIVITIES: CPT

## 2021-07-11 RX ORDER — PIPERACILLIN SODIUM, TAZOBACTAM SODIUM 4; .5 G/20ML; G/20ML
INJECTION, POWDER, LYOPHILIZED, FOR SOLUTION INTRAVENOUS
Status: COMPLETED
Start: 2021-07-11 | End: 2021-07-11

## 2021-07-11 RX ORDER — SODIUM CHLORIDE 9 MG/ML
INJECTION, SOLUTION INTRAVENOUS
Status: ACTIVE
Start: 2021-07-11 | End: 2021-07-11

## 2021-07-11 RX ADMIN — HEPARIN SODIUM 5000 UNITS: 5000 INJECTION, SOLUTION INTRAVENOUS; SUBCUTANEOUS at 05:13

## 2021-07-11 RX ADMIN — OXYCODONE 5 MG: 5 TABLET ORAL at 12:18

## 2021-07-11 RX ADMIN — OXYCODONE HYDROCHLORIDE 10 MG: 10 TABLET ORAL at 04:04

## 2021-07-11 RX ADMIN — METHOCARBAMOL TABLETS 750 MG: 750 TABLET, COATED ORAL at 21:38

## 2021-07-11 RX ADMIN — SENNOSIDES AND DOCUSATE SODIUM 2 TABLET: 8.6; 5 TABLET ORAL at 08:05

## 2021-07-11 RX ADMIN — OXYCODONE 5 MG: 5 TABLET ORAL at 16:49

## 2021-07-11 RX ADMIN — METHOCARBAMOL TABLETS 750 MG: 750 TABLET, COATED ORAL at 08:05

## 2021-07-11 RX ADMIN — PIPERACILLIN AND TAZOBACTAM 4.5 G: 4; .5 INJECTION, POWDER, LYOPHILIZED, FOR SOLUTION INTRAVENOUS; PARENTERAL at 23:27

## 2021-07-11 RX ADMIN — HEPARIN SODIUM 5000 UNITS: 5000 INJECTION, SOLUTION INTRAVENOUS; SUBCUTANEOUS at 22:14

## 2021-07-11 RX ADMIN — BACITRACIN ZINC: 500 OINTMENT TOPICAL at 21:41

## 2021-07-11 RX ADMIN — PIPERACILLIN AND TAZOBACTAM 4.5 G: 4; .5 INJECTION, POWDER, LYOPHILIZED, FOR SOLUTION INTRAVENOUS; PARENTERAL at 05:15

## 2021-07-11 RX ADMIN — PIPERACILLIN AND TAZOBACTAM 4.5 G: 4; .5 INJECTION, POWDER, LYOPHILIZED, FOR SOLUTION INTRAVENOUS; PARENTERAL at 17:29

## 2021-07-11 RX ADMIN — HEPARIN SODIUM 5000 UNITS: 5000 INJECTION, SOLUTION INTRAVENOUS; SUBCUTANEOUS at 15:33

## 2021-07-11 RX ADMIN — SENNOSIDES AND DOCUSATE SODIUM 2 TABLET: 8.6; 5 TABLET ORAL at 21:38

## 2021-07-11 RX ADMIN — GABAPENTIN 100 MG: 100 CAPSULE ORAL at 05:13

## 2021-07-11 RX ADMIN — PIPERACILLIN AND TAZOBACTAM 4.5 G: 4; .5 INJECTION, POWDER, LYOPHILIZED, FOR SOLUTION INTRAVENOUS; PARENTERAL at 12:52

## 2021-07-11 RX ADMIN — PIPERACILLIN AND TAZOBACTAM 4.5 G: 4; .5 INJECTION, POWDER, LYOPHILIZED, FOR SOLUTION INTRAVENOUS; PARENTERAL at 00:00

## 2021-07-11 RX ADMIN — BACITRACIN ZINC: 500 OINTMENT TOPICAL at 09:00

## 2021-07-11 RX ADMIN — GABAPENTIN 100 MG: 100 CAPSULE ORAL at 17:27

## 2021-07-11 RX ADMIN — PIPERACILLIN AND TAZOBACTAM 4.5 G: 4; .5 INJECTION, POWDER, LYOPHILIZED, FOR SOLUTION INTRAVENOUS; PARENTERAL at 12:50

## 2021-07-11 RX ADMIN — OXYCODONE 5 MG: 5 TABLET ORAL at 08:05

## 2021-07-11 RX ADMIN — ATENOLOL 50 MG: 25 TABLET ORAL at 05:13

## 2021-07-11 RX ADMIN — METHOCARBAMOL TABLETS 750 MG: 750 TABLET, COATED ORAL at 16:49

## 2021-07-11 RX ADMIN — OXYCODONE 5 MG: 5 TABLET ORAL at 21:38

## 2021-07-11 RX ADMIN — METHOCARBAMOL TABLETS 750 MG: 750 TABLET, COATED ORAL at 12:50

## 2021-07-11 ASSESSMENT — ACTIVITIES OF DAILY LIVING (ADL)
TOILET_TRANSFER_DESCRIPTION: GRAB BAR;ADAPTIVE EQUIPMENT;SET-UP OF EQUIPMENT;SUPERVISION FOR SAFETY
TOILETING_LEVEL_OF_ASSIST_DESCRIPTION: ADAPTIVE EQUIPMENT;SET-UP OF EQUIPMENT;SUPERVISION FOR SAFETY
BED_CHAIR_WHEELCHAIR_TRANSFER_DESCRIPTION: ADAPTIVE EQUIPMENT;SET-UP OF EQUIPMENT;SUPERVISION FOR SAFETY

## 2021-07-11 ASSESSMENT — FIBROSIS 4 INDEX: FIB4 SCORE: 2.69

## 2021-07-11 ASSESSMENT — PAIN DESCRIPTION - PAIN TYPE: TYPE: ACUTE PAIN

## 2021-07-11 NOTE — PROGRESS NOTES
"Rehab Progress Note     Interval Events (Subjective)  Peg reports that she did pretty well overnight.  She is working on taking deep breaths and using IS.  Still, somewhat pain limited.  She demonstrated IS at 1250ml for me.  She seems to think that her breathing has improved.    No difficulty with chest pain, no abdominal pain.  No other ROS complaints.    Discontinuing richards without difficulty, 18ml on scan after voiding.    Objective:  VITAL SIGNS: /51   Pulse 67   Temp 36.7 °C (98.1 °F) (Oral)   Resp 17   Ht 1.676 m (5' 6\")   Wt 104 kg (229 lb 4.5 oz)   SpO2 94%   BMI 37.01 kg/m²   Gen: Alert and cooperative and in no acute distress  HEENT: oxygen by nasal canula, now 2L  CV: Regular rate and rhythm.  No pitting edema in the lower extremities bilaterally  Lungs: Decreased breath sounds bilaterally  Abd: soft, nontender, nondistended  Ext: no calf tenderness      Radiology:  Xray Chest 07/10/2021  IMPRESSION:     1.  Ill-defined right perihilar opacity could represent atelectasis versus pneumonitis.    Recent Results (from the past 72 hour(s))   SARS-CoV-2 PCR (24 hour In-House): Collect NP swab in [facility]    Collection Time: 07/09/21  2:30 PM    Specimen: Nasopharyngeal; Respirate   Result Value Ref Range    SARS-CoV-2 Source NP Swab     SARS-CoV-2 by PCR NotDetected    CBC with Differential    Collection Time: 07/10/21  5:31 AM   Result Value Ref Range    WBC 4.8 4.8 - 10.8 K/uL    RBC 3.67 (L) 4.20 - 5.40 M/uL    Hemoglobin 10.0 (L) 12.0 - 16.0 g/dL    Hematocrit 31.1 (L) 37.0 - 47.0 %    MCV 84.7 81.4 - 97.8 fL    MCH 27.2 27.0 - 33.0 pg    MCHC 32.2 (L) 33.6 - 35.0 g/dL    RDW 45.9 35.9 - 50.0 fL    Platelet Count 142 (L) 164 - 446 K/uL    MPV 10.4 9.0 - 12.9 fL    Neutrophils-Polys 66.90 44.00 - 72.00 %    Lymphocytes 23.70 22.00 - 41.00 %    Monocytes 6.70 0.00 - 13.40 %    Eosinophils 1.50 0.00 - 6.90 %    Basophils 0.60 0.00 - 1.80 %    Immature Granulocytes 0.60 0.00 - 0.90 %    Nucleated RBC " 0.00 /100 WBC    Neutrophils (Absolute) 3.22 2.00 - 7.15 K/uL    Lymphs (Absolute) 1.14 1.00 - 4.80 K/uL    Monos (Absolute) 0.32 0.00 - 0.85 K/uL    Eos (Absolute) 0.07 0.00 - 0.51 K/uL    Baso (Absolute) 0.03 0.00 - 0.12 K/uL    Immature Granulocytes (abs) 0.03 0.00 - 0.11 K/uL    NRBC (Absolute) 0.00 K/uL   Comp Metabolic Panel (CMP)    Collection Time: 07/10/21  5:31 AM   Result Value Ref Range    Sodium 132 (L) 135 - 145 mmol/L    Potassium 3.8 3.6 - 5.5 mmol/L    Chloride 97 96 - 112 mmol/L    Co2 25 20 - 33 mmol/L    Anion Gap 10.0 7.0 - 16.0    Glucose 86 65 - 99 mg/dL    Bun 22 8 - 22 mg/dL    Creatinine 0.64 0.50 - 1.40 mg/dL    Calcium 8.7 8.5 - 10.5 mg/dL    AST(SGOT) 20 12 - 45 U/L    ALT(SGPT) 15 2 - 50 U/L    Alkaline Phosphatase 77 30 - 99 U/L    Total Bilirubin 0.5 0.1 - 1.5 mg/dL    Albumin 3.5 3.2 - 4.9 g/dL    Total Protein 6.5 6.0 - 8.2 g/dL    Globulin 3.0 1.9 - 3.5 g/dL    A-G Ratio 1.2 g/dL   HEMOGLOBIN A1C    Collection Time: 07/10/21  5:31 AM   Result Value Ref Range    Glycohemoglobin 6.3 (H) 4.0 - 5.6 %    Est Avg Glucose 134 mg/dL   TSH with Reflex to FT4    Collection Time: 07/10/21  5:31 AM   Result Value Ref Range    TSH 3.570 0.380 - 5.330 uIU/mL   Vitamin D, 25-hydroxy (blood)    Collection Time: 07/10/21  5:31 AM   Result Value Ref Range    25-Hydroxy   Vitamin D 25 41 30 - 100 ng/mL   ESTIMATED GFR    Collection Time: 07/10/21  5:31 AM   Result Value Ref Range    GFR If African American >60 >60 mL/min/1.73 m 2    GFR If Non African American >60 >60 mL/min/1.73 m 2       Current Facility-Administered Medications   Medication Frequency   • SODIUM CHLORIDE 0.9 % IV SOLN    • PIPERACILLIN SOD-TAZOBACTAM SO 4.5 (4-0.5) G IV SOLR    • atenolol (TENORMIN) tablet 50 mg Daily-0600   • piperacillin-tazobactam (ZOSYN) 4.5 g in  mL IVPB Q6HRS   • Respiratory Therapy Consult Continuous RT   • oxyCODONE immediate-release (ROXICODONE) tablet 5 mg Q3HRS PRN    Or   • oxyCODONE immediate  release (ROXICODONE) tablet 10 mg Q3HRS PRN   • hydrALAZINE (APRESOLINE) tablet 25 mg Q8HRS PRN   • acetaminophen (Tylenol) tablet 650 mg Q4HRS PRN   • senna-docusate (PERICOLACE or SENOKOT S) 8.6-50 MG per tablet 2 tablet BID    And   • polyethylene glycol/lytes (MIRALAX) PACKET 1 Packet QDAY PRN    And   • magnesium hydroxide (MILK OF MAGNESIA) suspension 30 mL QDAY PRN    And   • bisacodyl (DULCOLAX) suppository 10 mg QDAY PRN   • artificial tears ophthalmic solution 1 Drop PRN   • benzocaine-menthol (CEPACOL) lozenge 1 Lozenge Q2HRS PRN   • mag hydrox-al hydrox-simeth (MAALOX PLUS ES or MYLANTA DS) suspension 20 mL Q2HRS PRN   • ondansetron (ZOFRAN ODT) dispertab 4 mg 4X/DAY PRN    Or   • ondansetron (ZOFRAN) syringe/vial injection 4 mg 4X/DAY PRN   • traZODone (DESYREL) tablet 50 mg QHS PRN   • sodium chloride (OCEAN) 0.65 % nasal spray 2 Spray PRN   • heparin injection 5,000 Units Q8HRS   • bacitracin ointment BID   • diazePAM (VALIUM) tablet 5 mg Q6HRS PRN   • gabapentin (NEURONTIN) capsule 100 mg BID   • methocarbamol (ROBAXIN) tablet 750 mg 4X/DAY   • traMADol (ULTRAM) 50 MG tablet 50 mg Q6HRS PRN       Orders Placed This Encounter   Procedures   • Diet Order Diet: Regular     Standing Status:   Standing     Number of Occurrences:   1     Order Specific Question:   Diet:     Answer:   Regular [1]       Assessment:  Active Hospital Problems    Diagnosis    • *Spinal fracture of T8 vertebra (HCC)    • Thrombocytopenia (HCC)    • Anemia    • Debility    • HTN (hypertension)    • Obesity    • Hyponatremia        Medical Decision Making and Plan:    T8 wedge fracture - Mechanical fall out of camper with T8 wedge fracture managed non-operatively. TLSO when EOB or when supine if too painful.   -PT and OT for mobility and ADLs  -Follow-up with NSG     Pain Control - Patient on Gabapentin 100 mg BID and PRN Oxycodone. Also on Robaxin 750 mg QID.  Unchanged.     HTN/Arrhythmia - Patient on Atenolol 50 mg daily       Anemia - Hb/Hct 10/31.1     Thrombocytopenia - 142, continue to monitor     Hyponatremia  - 132, continue to monitor.  Improving.    Hypoxia/Atelectasis vs. Pneumonitis - Started Zosyn.  She is feeling less hypoxic and her oxygen was decreased to 2L today.       Morbid Obesity due to excess calories - Patient with BMI of 37.0 on admission, meeting medical criteria. Dietitian to consult.      DVT Ppx - Patient on Heparin on transfer.    Total time: 27 minutes.  I spent greater than 50% of the time for patient care and coordination on this date, including unit/floor time, and face-to-face time with the patient as per assessment and plan above.    Travis Templeton M.D.

## 2021-07-11 NOTE — THERAPY
Occupational Therapy  Daily Treatment     Patient Name: Peg Louie  Age:  76 y.o., Sex:  female  Medical Record #: [id number]  Today's Date: 7/11/2021     Precautions  Precautions: (P) Fall Risk, Spinal / Back Precautions , TLSO (Thoracolumbosacral orthosis)  Comments: Fall Risk;Spinal / Back Precautions ;TLSO (Thoracolumbosacral orthosis)         Subjective    Patient expressed desire to brush teeth and use the restroom at start of session. Also stating her TLSO not fitting well.     Objective       07/11/21 1001   Precautions   Precautions Fall Risk;Spinal / Back Precautions ;TLSO (Thoracolumbosacral orthosis)   Vitals   O2 (LPM) 3   O2 Delivery Device Nasal Cannula   Functional Level of Assist   Grooming Supervision   Grooming Description Standing at sink;Supervision for safety   Upper Body Dressing Minimal Assist   Upper Body Dressing Description Set-up of equipment;Supervision for safety;Verbal cueing  (min A to don TLSO)   Lower Body Dressing Stand by Assist   Lower Body Dressing Description Dressing stick;Sock aid;Set-up of equipment;Supervision for safety;Verbal cueing  (don/doff pants up to knees and socks on/off w/ AE)   Toileting Stand by Assist   Toileting Description Adaptive equipment;Set-up of equipment;Supervision for safety  (4ww for support)   Bed, Chair, Wheelchair Transfer Stand by Assist   Bed Chair Wheelchair Transfer Description Adaptive equipment;Set-up of equipment;Supervision for safety  (4ww)   Toilet Transfers Stand by Assist   Toilet Transfer Description Grab bar;Adaptive equipment;Set-up of equipment;Supervision for safety  (4ww)   Bed Mobility    Sit to Supine Stand by Assist   Scooting Stand by Assist   Interdisciplinary Plan of Care Collaboration   IDT Collaboration with  Nursing   Patient Position at End of Therapy In Bed;Call Light within Reach;Tray Table within Reach;Phone within Reach   Collaboration Comments informed RN about swollen left hand   OT Total Time Spent   OT  Individual Total Time Spent (Mins) 60   OT Charge Group   OT Self Care / ADL 3   OT Therapy Activity 1     Functional mobility with 4ww and SBA in room, bathroom, hallway.      Educated patient on correct placement of side velcro straps on TLSO.  Lowered chest piece on TLSO one notch, per patient request.    Educated patient on purpose of and how to use AE for LB Dressing.    Assessment    Patient tolerated all activities well during session and appreciated having TLSO adjusted.    Strengths: Able to follow instructions, Alert and oriented, Effective communication skills, Good insight into deficits/needs, Independent prior level of function, Making steady progress towards goals, Motivated for self care and independence, Pleasant and cooperative, Supportive family, Willingly participates in therapeutic activities  Barriers: Generalized weakness, Impaired activity tolerance, Impaired balance, Pain, Pain poorly managed    Plan    Endurance, UE strengthening, balance, functional transfers/mobility    Occupational Therapy Goals (Active)     Problem: Bathing     Dates: Start: 07/10/21       Goal: STG-Within one week, patient will bathe with supervision using AE and DME as needed.     Dates: Start: 07/10/21             Problem: Dressing     Dates: Start: 07/10/21       Goal: STG-Within one week, patient will dress LB with min A using AE as needed.     Dates: Start: 07/10/21             Problem: Functional Transfers     Dates: Start: 07/10/21       Goal: STG-Within one week, patient will transfer to toilet with supervision.     Dates: Start: 07/10/21             Problem: OT Long Term Goals     Dates: Start: 07/10/21       Goal: LTG-By discharge, patient will complete basic self care tasks with mod I to supervision.      Dates: Start: 07/10/21          Goal: LTG-By discharge, patient will perform bathroom transfers with mod I to supervision.     Dates: Start: 07/10/21             Problem: Toileting     Dates: Start: 07/10/21        Goal: STG-Within one week, patient will complete toileting tasks with min A.     Dates: Start: 07/10/21

## 2021-07-11 NOTE — FLOWSHEET NOTE
07/11/21 1319   Events/Summary/Plan   Events/Summary/Plan 02 spot check; no wean yet   Vital Signs   Pulse 65   Respiration 18   Pulse Oximetry 95 %   $ Pulse Oximetry (Spot Check) Yes   Respiratory Assessment   Level of Consciousness Alert   Chest Exam   Work Of Breathing / Effort Within Normal Limits   Oxygen   O2 (LPM) 3   O2 Delivery Device Silicone Nasal Cannula

## 2021-07-11 NOTE — DISCHARGE PLANNING
CASE MANAGEMENT INITIAL ASSESSMENT    Admit Date:  7/9/2021     I was unable to get a hold of patient or patients daughter. I spoke with Fei to discuss role of case management / discharge planning / team conference. He was alert and able to provide information.   Patient is a  76 y.o. female transferred from [facility] [place] where she was admitted from 07/06-07/09.    Admitting Physician: Dr. Penn  PCP: patient will need one in the [place] area   Neurosurgery: Dr. Eduardo    Diagnosis: Closed wedge compression fracture of T8 vertebra, initial encounter ([facility]) [S22.060A]    Co-morbidities:   Patient Active Problem List    Diagnosis Date Noted   • Thrombocytopenia ([facility]) 07/08/2021   • HTN (hypertension) 07/07/2021   • Obesity 07/07/2021   • Hyponatremia 07/07/2021   • Anemia 07/07/2021   • Debility 07/07/2021   • Spinal fracture of T7 vertebra ([facility]) 07/06/2021   • Spinal fracture of T8 vertebra ([facility]) 07/06/2021     Prior Living Situation:  Housing / Facility: [address]  Lives with - Patient's Self Care Capacity: Adult Children    Prior Level of Function:  Medication Management: Independent  Finances: Independent  Home Management: Independent  Shopping: Independent  Prior Level Of Mobility: Independent Without Device in Community, Independent Without Device in Home  Driving / Transportation: Driving Independent    Support Systems:  Primary : Hayden Louie (daughter) [phone], Fei Louie (ex-) [phone]    Previous Services Utilized:   Equipment Owned: None  Prior Services: Home-Independent    Other Information:  Occupation (Pre-Hospital Vocational): Employed Full Time  Primary Payor Source: Medicare B, Medicare A  Secondary Payor Source: Other (Comments) (aarp )  Primary Care Practitioner :  (patient lives in [place] )  Other MDs: Dr. Eduardo     Patient / Family Goal:  Patient / Family Goal: patient lives in [place] and was visiting family. Patient will be moving to [place] with daughter at discharge. She  lives in a single level home with one very small step to enter. There are no steps in the home. Per Fei both homes in [place] and [place] are being fixed to support patients discharge. Patient does not have DME. Patients goal is to return to good health.     Additional Case Management Questions:  Have you ever received case management services for yourself or a family member? No    Do you feel you have and an understanding of what services  provide? Yes    Do you have any additional questions regarding case management?    No, Maryanne's information provided.       CASE MANAGEMENT PLAN OF CARE   Individualized Goals:   1. Return to good health   2. Be discharged for daughters home  3. Follow up appointments    Barriers:   1. Functional deficit       Plan:  1. Continue to follow patient through hospitalization and provide discharge planning in collaboration with patient, family, physicians and ancillary services.     2. Utilize community resources to ensure a safe discharge.

## 2021-07-11 NOTE — CARE PLAN
Problem: Neurogenic Bladder  Goal: Patient will demonstrate self-cath technique using clean technique and care of the catheter  Note: Galaviz catheter removed today at 1530 by day RN. Pt able to void after FC out. Denies pain or discomfort when voiding. Latest PVR - 18ml.      Problem: Infection  Goal: Patient will remain free from infection  Note: Pt started on Zosyn 4.5mg IV by MD. Started PIV on left forearm by RN.

## 2021-07-12 ENCOUNTER — APPOINTMENT (OUTPATIENT)
Dept: RADIOLOGY | Facility: REHABILITATION | Age: 76
DRG: 559 | End: 2021-07-12
Attending: HOSPITALIST
Payer: MEDICARE

## 2021-07-12 PROBLEM — T14.90XA TRAUMA: Status: ACTIVE | Noted: 2021-07-12

## 2021-07-12 PROBLEM — E87.6 HYPOKALEMIA: Status: ACTIVE | Noted: 2021-07-12

## 2021-07-12 PROBLEM — R09.02 HYPOXIA: Status: ACTIVE | Noted: 2021-07-12

## 2021-07-12 LAB
ALBUMIN SERPL BCP-MCNC: 3.7 G/DL (ref 3.2–4.9)
ALBUMIN/GLOB SERPL: 1.3 G/DL
ALP SERPL-CCNC: 82 U/L (ref 30–99)
ALT SERPL-CCNC: 23 U/L (ref 2–50)
ANION GAP SERPL CALC-SCNC: 9 MMOL/L (ref 7–16)
AST SERPL-CCNC: 28 U/L (ref 12–45)
BASOPHILS # BLD AUTO: 0.6 % (ref 0–1.8)
BASOPHILS # BLD: 0.03 K/UL (ref 0–0.12)
BILIRUB SERPL-MCNC: 0.5 MG/DL (ref 0.1–1.5)
BUN SERPL-MCNC: 14 MG/DL (ref 8–22)
CALCIUM SERPL-MCNC: 9.2 MG/DL (ref 8.5–10.5)
CHLORIDE SERPL-SCNC: 98 MMOL/L (ref 96–112)
CO2 SERPL-SCNC: 28 MMOL/L (ref 20–33)
CREAT SERPL-MCNC: 0.75 MG/DL (ref 0.5–1.4)
EOSINOPHIL # BLD AUTO: 0.09 K/UL (ref 0–0.51)
EOSINOPHIL NFR BLD: 1.7 % (ref 0–6.9)
ERYTHROCYTE [DISTWIDTH] IN BLOOD BY AUTOMATED COUNT: 46.2 FL (ref 35.9–50)
GLOBULIN SER CALC-MCNC: 2.9 G/DL (ref 1.9–3.5)
GLUCOSE SERPL-MCNC: 91 MG/DL (ref 65–99)
HCT VFR BLD AUTO: 31.8 % (ref 37–47)
HGB BLD-MCNC: 10.2 G/DL (ref 12–16)
IMM GRANULOCYTES # BLD AUTO: 0.03 K/UL (ref 0–0.11)
IMM GRANULOCYTES NFR BLD AUTO: 0.6 % (ref 0–0.9)
LYMPHOCYTES # BLD AUTO: 1.86 K/UL (ref 1–4.8)
LYMPHOCYTES NFR BLD: 35.2 % (ref 22–41)
MCH RBC QN AUTO: 27.3 PG (ref 27–33)
MCHC RBC AUTO-ENTMCNC: 32.1 G/DL (ref 33.6–35)
MCV RBC AUTO: 85.3 FL (ref 81.4–97.8)
MONOCYTES # BLD AUTO: 0.35 K/UL (ref 0–0.85)
MONOCYTES NFR BLD AUTO: 6.6 % (ref 0–13.4)
NEUTROPHILS # BLD AUTO: 2.92 K/UL (ref 2–7.15)
NEUTROPHILS NFR BLD: 55.3 % (ref 44–72)
NRBC # BLD AUTO: 0 K/UL
NRBC BLD-RTO: 0 /100 WBC
PLATELET # BLD AUTO: 163 K/UL (ref 164–446)
PMV BLD AUTO: 10.3 FL (ref 9–12.9)
POTASSIUM SERPL-SCNC: 3.4 MMOL/L (ref 3.6–5.5)
PROT SERPL-MCNC: 6.6 G/DL (ref 6–8.2)
RBC # BLD AUTO: 3.73 M/UL (ref 4.2–5.4)
SODIUM SERPL-SCNC: 135 MMOL/L (ref 135–145)
WBC # BLD AUTO: 5.3 K/UL (ref 4.8–10.8)

## 2021-07-12 PROCEDURE — 97116 GAIT TRAINING THERAPY: CPT

## 2021-07-12 PROCEDURE — 700111 HCHG RX REV CODE 636 W/ 250 OVERRIDE (IP): Performed by: PHYSICAL MEDICINE & REHABILITATION

## 2021-07-12 PROCEDURE — 97110 THERAPEUTIC EXERCISES: CPT

## 2021-07-12 PROCEDURE — 85025 COMPLETE CBC W/AUTO DIFF WBC: CPT

## 2021-07-12 PROCEDURE — A9270 NON-COVERED ITEM OR SERVICE: HCPCS | Performed by: PHYSICAL MEDICINE & REHABILITATION

## 2021-07-12 PROCEDURE — 36415 COLL VENOUS BLD VENIPUNCTURE: CPT

## 2021-07-12 PROCEDURE — 94760 N-INVAS EAR/PLS OXIMETRY 1: CPT

## 2021-07-12 PROCEDURE — 71045 X-RAY EXAM CHEST 1 VIEW: CPT

## 2021-07-12 PROCEDURE — 97530 THERAPEUTIC ACTIVITIES: CPT

## 2021-07-12 PROCEDURE — 99233 SBSQ HOSP IP/OBS HIGH 50: CPT | Mod: GC | Performed by: PHYSICAL MEDICINE & REHABILITATION

## 2021-07-12 PROCEDURE — 80053 COMPREHEN METABOLIC PANEL: CPT

## 2021-07-12 PROCEDURE — 700105 HCHG RX REV CODE 258: Performed by: PHYSICAL MEDICINE & REHABILITATION

## 2021-07-12 PROCEDURE — 700102 HCHG RX REV CODE 250 W/ 637 OVERRIDE(OP): Performed by: PHYSICAL MEDICINE & REHABILITATION

## 2021-07-12 PROCEDURE — 700101 HCHG RX REV CODE 250: Performed by: PHYSICAL MEDICINE & REHABILITATION

## 2021-07-12 PROCEDURE — 770010 HCHG ROOM/CARE - REHAB SEMI PRIVAT*

## 2021-07-12 PROCEDURE — 99221 1ST HOSP IP/OBS SF/LOW 40: CPT | Performed by: HOSPITALIST

## 2021-07-12 PROCEDURE — 97535 SELF CARE MNGMENT TRAINING: CPT

## 2021-07-12 PROCEDURE — 700102 HCHG RX REV CODE 250 W/ 637 OVERRIDE(OP): Performed by: HOSPITALIST

## 2021-07-12 PROCEDURE — A9270 NON-COVERED ITEM OR SERVICE: HCPCS | Performed by: HOSPITALIST

## 2021-07-12 RX ORDER — LIDOCAINE 50 MG/G
2 PATCH TOPICAL DAILY
Status: DISCONTINUED | OUTPATIENT
Start: 2021-07-12 | End: 2021-07-20 | Stop reason: HOSPADM

## 2021-07-12 RX ORDER — CEFDINIR 300 MG/1
300 CAPSULE ORAL EVERY 12 HOURS
Status: COMPLETED | OUTPATIENT
Start: 2021-07-12 | End: 2021-07-14

## 2021-07-12 RX ORDER — POTASSIUM CHLORIDE 20 MEQ/1
20 TABLET, EXTENDED RELEASE ORAL DAILY
Status: COMPLETED | OUTPATIENT
Start: 2021-07-12 | End: 2021-07-14

## 2021-07-12 RX ADMIN — SENNOSIDES AND DOCUSATE SODIUM 2 TABLET: 8.6; 5 TABLET ORAL at 09:04

## 2021-07-12 RX ADMIN — GABAPENTIN 100 MG: 100 CAPSULE ORAL at 17:33

## 2021-07-12 RX ADMIN — PIPERACILLIN AND TAZOBACTAM 4.5 G: 4; .5 INJECTION, POWDER, LYOPHILIZED, FOR SOLUTION INTRAVENOUS; PARENTERAL at 05:41

## 2021-07-12 RX ADMIN — CEFDINIR 300 MG: 300 CAPSULE ORAL at 19:56

## 2021-07-12 RX ADMIN — POTASSIUM CHLORIDE 20 MEQ: 1500 TABLET, EXTENDED RELEASE ORAL at 12:10

## 2021-07-12 RX ADMIN — METHOCARBAMOL TABLETS 750 MG: 750 TABLET, COATED ORAL at 14:23

## 2021-07-12 RX ADMIN — METHOCARBAMOL TABLETS 750 MG: 750 TABLET, COATED ORAL at 09:04

## 2021-07-12 RX ADMIN — GABAPENTIN 100 MG: 100 CAPSULE ORAL at 05:43

## 2021-07-12 RX ADMIN — OXYCODONE HYDROCHLORIDE 10 MG: 10 TABLET ORAL at 14:24

## 2021-07-12 RX ADMIN — ATENOLOL 50 MG: 25 TABLET ORAL at 09:13

## 2021-07-12 RX ADMIN — HEPARIN SODIUM 5000 UNITS: 5000 INJECTION, SOLUTION INTRAVENOUS; SUBCUTANEOUS at 22:05

## 2021-07-12 RX ADMIN — METHOCARBAMOL TABLETS 750 MG: 750 TABLET, COATED ORAL at 19:56

## 2021-07-12 RX ADMIN — OXYCODONE 5 MG: 5 TABLET ORAL at 06:14

## 2021-07-12 RX ADMIN — OXYCODONE HYDROCHLORIDE 10 MG: 10 TABLET ORAL at 09:04

## 2021-07-12 RX ADMIN — METHOCARBAMOL TABLETS 750 MG: 750 TABLET, COATED ORAL at 17:33

## 2021-07-12 RX ADMIN — POLYETHYLENE GLYCOL 3350 1 PACKET: 17 POWDER, FOR SOLUTION ORAL at 19:57

## 2021-07-12 RX ADMIN — HEPARIN SODIUM 5000 UNITS: 5000 INJECTION, SOLUTION INTRAVENOUS; SUBCUTANEOUS at 06:04

## 2021-07-12 RX ADMIN — LIDOCAINE 2 PATCH: 50 PATCH TOPICAL at 17:39

## 2021-07-12 RX ADMIN — OXYCODONE 5 MG: 5 TABLET ORAL at 19:55

## 2021-07-12 RX ADMIN — HEPARIN SODIUM 5000 UNITS: 5000 INJECTION, SOLUTION INTRAVENOUS; SUBCUTANEOUS at 14:23

## 2021-07-12 RX ADMIN — OXYCODONE HYDROCHLORIDE 10 MG: 10 TABLET ORAL at 01:37

## 2021-07-12 RX ADMIN — SENNOSIDES AND DOCUSATE SODIUM 2 TABLET: 8.6; 5 TABLET ORAL at 19:56

## 2021-07-12 RX ADMIN — BACITRACIN ZINC: 500 OINTMENT TOPICAL at 09:00

## 2021-07-12 RX ADMIN — CEFDINIR 300 MG: 300 CAPSULE ORAL at 12:10

## 2021-07-12 ASSESSMENT — GAIT ASSESSMENTS
DEVIATION: ANTALGIC;DECREASED BASE OF SUPPORT;BRADYKINETIC;DECREASED HEEL STRIKE
DISTANCE (FEET): 475
ASSISTIVE DEVICE: 4 WHEEL WALKER
GAIT LEVEL OF ASSIST: STAND BY ASSIST

## 2021-07-12 ASSESSMENT — ACTIVITIES OF DAILY LIVING (ADL)
TOILETING_LEVEL_OF_ASSIST_DESCRIPTION: GRAB BAR;INCREASED TIME;INITIAL PREPARATION FOR TASK;SUPERVISION FOR SAFETY;VERBAL CUEING
TOILET_TRANSFER_DESCRIPTION: INCREASED TIME;SET-UP OF EQUIPMENT;SUPERVISION FOR SAFETY;VERBAL CUEING

## 2021-07-12 ASSESSMENT — ENCOUNTER SYMPTOMS
COUGH: 0
FEVER: 0
NAUSEA: 0
EYES NEGATIVE: 1
SHORTNESS OF BREATH: 0
VOMITING: 0
ABDOMINAL PAIN: 0
PALPITATIONS: 0
BACK PAIN: 1
CHILLS: 0
POLYDIPSIA: 0
BRUISES/BLEEDS EASILY: 0

## 2021-07-12 ASSESSMENT — PAIN DESCRIPTION - PAIN TYPE
TYPE: ACUTE PAIN

## 2021-07-12 NOTE — THERAPY
Occupational Therapy  Daily Treatment     Patient Name: Peg Louie  Age:  76 y.o., Sex:  female  Medical Record #: [id number]  Today's Date: 2021     Precautions  Precautions: Fall Risk, Spinal / Back Precautions , TLSO (Thoracolumbosacral orthosis)  Comments: Fall Risk;Spinal / Back Precautions ;TLSO (Thoracolumbosacral orthosis)         Subjective    Pt supine in bed upon arrival, pleasant, copperative, and agreeable to therapy.       Objective  SpO2%- seated on room air: 86%, seated on 2L before ambulation: 85%, seated on 2L after walkin%    Functional mobility with 4WW - ambulation around main gym to gather objects at various levels incl between knee level and chest height. Pt able to tolerate ambulation for 4 minutes to arrive at gym, 8 minutes to complete activitiy followed by 5 minutes walking to return to room with a rest break in between each. Did not require assist to manage O2 as pt used 4ww seat to store items.       21 1031   Functional Level of Assist   Upper Body Dressing Minimal Assist  (A don TLSO)   Bed, Chair, Wheelchair Transfer Stand by Assist  (bed<>4WW)   Interdisciplinary Plan of Care Collaboration   Patient Position at End of Therapy In Bed;Call Light within Reach;Tray Table within Reach   OT Total Time Spent   OT Individual Total Time Spent (Mins) 30   OT Charge Group   OT Therapy Activity 2       Assessment    Pt tolerated session well. Pt participated in a functional mobility task with a focus on standing tolearce and endurance during activity at the SBA-CGA level overall. Pt cued for taking breaks when needed, but able to set goal of finishing task and follow through before taking a break.      Strengths: Able to follow instructions, Alert and oriented, Effective communication skills, Good insight into deficits/needs, Independent prior level of function, Making steady progress towards goals, Motivated for self care and independence, Pleasant and cooperative,  Supportive family, Willingly participates in therapeutic activities  Barriers: Generalized weakness, Impaired activity tolerance, Impaired balance, Pain, Pain poorly managed    Plan    Refer to primarty OT goals/POC. Cont focus on endurance and standing tolerance during activity.        Occupational Therapy Goals (Active)     Problem: Bathing     Dates: Start: 07/10/21       Goal: STG-Within one week, patient will bathe with supervision using AE and DME as needed.     Dates: Start: 07/10/21             Problem: Dressing     Dates: Start: 07/10/21       Goal: STG-Within one week, patient will dress LB with min A using AE as needed.     Dates: Start: 07/10/21             Problem: Functional Transfers     Dates: Start: 07/10/21       Goal: STG-Within one week, patient will transfer to toilet with supervision.     Dates: Start: 07/10/21             Problem: OT Long Term Goals     Dates: Start: 07/10/21       Goal: LTG-By discharge, patient will complete basic self care tasks with mod I to supervision.      Dates: Start: 07/10/21          Goal: LTG-By discharge, patient will perform bathroom transfers with mod I to supervision.     Dates: Start: 07/10/21             Problem: Toileting     Dates: Start: 07/10/21       Goal: STG-Within one week, patient will complete toileting tasks with min A.     Dates: Start: 07/10/21

## 2021-07-12 NOTE — PROGRESS NOTES
Rehab Progress Note      Encounter Date: 7/12/2021     CC: Back pain, knee pain      Interval Events (Subjective)  Patient is at bedside. Patient is able to participate in therapy. Pain is controlled. She says her breathing has improved since receiving the antibiotics. She denies any nausea, vomit, and diarrhea.      DC/Disposition:       Objective:  .Blood Pressure : 138/69, Pulse: 96, Respiration: 18, Temperature: 36.9 °C (98.4 °F), Weight: 107 kg (235 lb 14.3 oz), Pulse Oximetry: 92 %, O2 (LPM): 2, O2 Delivery Device: Silicone Nasal Cannula     Gen: NAD  Psych: Mood and affect appropriate  CV: RRR, no edema  Resp: no distress,decrease breath sounds    Recent Labs     Recent Labs     07/10/21  0531 07/12/21  0558   WBC 4.8 5.3   RBC 3.67* 3.73*   HEMOGLOBIN 10.0* 10.2*   HEMATOCRIT 31.1* 31.8*   MCV 84.7 85.3   MCH 27.2 27.3   RDW 45.9 46.2   PLATELETCT 142* 163*   MPV 10.4 10.3   NEUTSPOLYS 66.90 55.30   LYMPHOCYTES 23.70 35.20   MONOCYTES 6.70 6.60   EOSINOPHILS 1.50 1.70   BASOPHILS 0.60 0.60     Recent Labs     07/10/21  0531 07/12/21  0558   SODIUM 132* 135   POTASSIUM 3.8 3.4*   CHLORIDE 97 98   CO2 25 28   GLUCOSE 86 91   BUN 22 14       Current Facility-Administered Medications:   •  potassium chloride SA (Kdur) tablet 20 mEq, 20 mEq, Oral, DAILY, Sugey Penn M.D., 20 mEq at 07/12/21 1210  •  cefdinir (OMNICEF) capsule 300 mg, 300 mg, Oral, Q12HRS, Sully Alvarado M.D., 300 mg at 07/12/21 1210  •  atenolol (TENORMIN) tablet 50 mg, 50 mg, Oral, Daily-0600, Travis Templeton M.D., 50 mg at 07/12/21 0913  •  Respiratory Therapy Consult, , Nebulization, Continuous RT, Sugey Penn M.D.  •  oxyCODONE immediate-release (ROXICODONE) tablet 5 mg, 5 mg, Oral, Q3HRS PRN, 5 mg at 07/12/21 0614 **OR** oxyCODONE immediate release (ROXICODONE) tablet 10 mg, 10 mg, Oral, Q3HRS PRN, Sugey Penn M.D., 10 mg at 07/12/21 0904  •  hydrALAZINE (APRESOLINE) tablet 25 mg, 25 mg, Oral, Q8HRS PRN,  Sugey Penn M.D.  •  acetaminophen (Tylenol) tablet 650 mg, 650 mg, Oral, Q4HRS PRN, Sugey Penn M.D.  •  senna-docusate (PERICOLACE or SENOKOT S) 8.6-50 MG per tablet 2 tablet, 2 tablet, Oral, BID, 2 tablet at 07/12/21 0904 **AND** polyethylene glycol/lytes (MIRALAX) PACKET 1 Packet, 1 Packet, Oral, QDAY PRN **AND** magnesium hydroxide (MILK OF MAGNESIA) suspension 30 mL, 30 mL, Oral, QDAY PRN **AND** bisacodyl (DULCOLAX) suppository 10 mg, 10 mg, Rectal, QDAY PRN, Sugey Penn M.D.  •  artificial tears ophthalmic solution 1 Drop, 1 Drop, Both Eyes, PRN, Sugey Penn M.D.  •  benzocaine-menthol (CEPACOL) lozenge 1 Lozenge, 1 Lozenge, Mouth/Throat, Q2HRS PRN, Sugey Penn M.D.  •  mag hydrox-al hydrox-simeth (MAALOX PLUS ES or MYLANTA DS) suspension 20 mL, 20 mL, Oral, Q2HRS PRN, Sugey Penn M.D.  •  ondansetron (ZOFRAN ODT) dispertab 4 mg, 4 mg, Oral, 4X/DAY PRN **OR** ondansetron (ZOFRAN) syringe/vial injection 4 mg, 4 mg, Intramuscular, 4X/DAY PRN, Sugey Penn M.D.  •  traZODone (DESYREL) tablet 50 mg, 50 mg, Oral, QHS PRN, Sugey Penn M.D.  •  sodium chloride (OCEAN) 0.65 % nasal spray 2 Spray, 2 Spray, Nasal, PRN, Sugey Penn M.D.  •  heparin injection 5,000 Units, 5,000 Units, Subcutaneous, Q8HRS, Sugey Penn M.D., 5,000 Units at 07/12/21 0604  •  diazePAM (VALIUM) tablet 5 mg, 5 mg, Oral, Q6HRS PRN, Sugey Penn M.D.  •  gabapentin (NEURONTIN) capsule 100 mg, 100 mg, Oral, BID, Sugey Penn M.D., 100 mg at 07/12/21 0543  •  methocarbamol (ROBAXIN) tablet 750 mg, 750 mg, Oral, 4X/DAY, Sugey Penn M.D., 750 mg at 07/12/21 0904  •  traMADol (ULTRAM) 50 MG tablet 50 mg, 50 mg, Oral, Q6HRS PRN, Sugey Penn M.D., 50 mg at 07/10/21 1221     Assessment:          Active Hospital Problems     Active Hospital Problems     Diagnosis     • *Spinal  fracture of T8 vertebra (HCC)     • Thrombocytopenia (HCC)     • Anemia     • Debility     • HTN (hypertension)     • Obesity     • Hyponatremia        Medical Decision Making and Plan:  T8 wedge fracture - Mechanical fall out of camper with T8 wedge fracture managed non-operatively. TLSO when EOB or when supine if too painful.   -PT and OT for mobility and ADLs  -Follow-up with NSG    Pain Control - Patient on Gabapentin 100 mg BID and PRN Oxycodone. Also on Robaxin 750 mg QID.  Unchanged.     HTN/Arrhythmia - Patient on Atenolol 50 mg daily      Anemia - Hb/Hct 10.2/31.8 stable on 7/12     Thrombocytopenia - 142, continue to monitor     Hyponatremia  - 132 on 7/10, improving 135 on 7/12 continue to monitor.  Improving.     Hypoxia/Atelectasis vs. Pneumonitis - less hypoxic and her oxygen was decreased to 2L. given Zosyn on 7/10-7/11 and was less hypoxic and oxygen was decrease  2L. Consult Hospitalist and  started on Omnicef on 7/12  and continue incentive spirometry  Pending repeat cxr       Morbid Obesity due to excess calories - Patient with BMI of 37.0 on admission, meeting medical criteria. Dietitian to consult.      DVT Ppx - Patient on Heparin on transfer.

## 2021-07-12 NOTE — THERAPY
07/12/21 0929   Precautions   Precautions Fall Risk;Spinal / Back Precautions ;TLSO (Thoracolumbosacral orthosis)   Pain 0 - 10 Group   Location Back   Location Orientation Mid   Therapist Pain Assessment Prior to Activity;During Activity;7;Nurse Notified   Cognition    Cognition / Consciousness WDL   Level of Consciousness Alert   Gait Functional Level of Assist    Gait Level Of Assist Stand by Assist   Assistive Device 4 Wheel Walker   Distance (Feet) 475  (475FT x 2, 375 ft x 1)   # of Times Distance was Traveled 2   Deviation Antalgic;Decreased Base Of Support;Bradykinetic;Decreased Heel Strike   Standing Lower Body Exercises   Standing Lower Body Exercises Yes   Hip Flexion 1 set of 15;Bilateral   Hip Extension 1 set of 15;Bilateral    Hip Abduction 1 set of 15;Bilateral   Heel Rise 1 set of 15;Bilateral   Mini Squat 1 set of 15;Partial   Bed Mobility    Supine to Sit Stand by Assist   Sit to Stand Contact Guard Assist   Scooting Stand by Assist   Rolling Supervised   PT Total Time Spent   PT Individual Total Time Spent (Mins) 60   PT Charge Group   PT Gait Training 2   PT Therapeutic Exercise 1   PT Therapeutic Activities 1   Physical Therapy   Daily Treatment     Patient Name: Peg Louie  Age:  76 y.o., Sex:  female  Medical Record #: [id number]  Today's Date: 7/12/2021     Precautions  Precautions: Fall Risk, Spinal / Back Precautions , TLSO (Thoracolumbosacral orthosis)  Comments: Fall Risk;Spinal / Back Precautions ;TLSO (Thoracolumbosacral orthosis)    Subjective    The pt was resting in bed. She agreed to PT but reported 7 out of 10 back pain.      Objective    The pt sat to the edge of bed and was assisted to but on the TLSO and adjust it. The pt participated in gait training with a 4WW cga/sba and tolerated 475 ft x 2 and 375 ft x 1. She took  A sitting rest to take her medications. The pt also did 1 set of 15 standing LE therapeutic exercises. She reported spasms in her back when doing R  hip abduction and extension.     Assessment    The pt had significant back pain at 7 of 10 but she said it was better when she was standing and walking. She demonstrates spine precautions consistently and is motivated to participate in therapy to improve function.  Strengths: Able to follow instructions, Adequate strength, Effective communication skills, Independent prior level of function, Making steady progress towards goals, Pleasant and cooperative, Motivated for self care and independence, Willingly participates in therapeutic activities  Barriers: Pain, Decreased endurance, Generalized weakness, Impaired balance, Impaired activity tolerance    Plan    Safety education, bed mobility and transfers with spine precautions, therapeutic exercise, gait    Passport items to be completed:  Passport items to be completed:  Get in/out of bed safely, in/out of a vehicle, safely use mobility device, walk or wheel around home/community, navigate up and down stairs, show how to get up/down from the ground, ensure home is accessible, demonstrate HEP, complete caregiver training    Physical Therapy Problems (Active)     Problem: Balance     Dates: Start: 07/10/21       Goal: STG-Within one week, patient will maintain dynamic standing perform Harris balance     Dates: Start: 07/10/21             Problem: Mobility     Dates: Start: 07/10/21       Goal: STG-Within one week, patient will ambulate household distance with FWW vs 4WW x100'     Dates: Start: 07/10/21          Goal: STG-Within one week, patient will ambulate up/down a curb with LRAD CGA     Dates: Start: 07/10/21             Problem: Mobility Transfers     Dates: Start: 07/10/21       Goal: STG-Within one week, patient will transfer bed to chair Josh w/c<>bed in room     Dates: Start: 07/10/21             Problem: PT-Long Term Goals     Dates: Start: 07/10/21       Goal: LTG-By discharge, patient will ambulate LRAD x300' Josh indoors     Dates: Start: 07/10/21           Goal: LTG-By discharge, patient will transfer one surface to another Josh with LRAD     Dates: Start: 07/10/21          Goal: LTG-By discharge, patient will perform home exercise program: Josh with amb and strengthening     Dates: Start: 07/10/21          Goal: LTG-By discharge, patient will transfer in/out of a car SPV with LRAD     Dates: Start: 07/10/21

## 2021-07-12 NOTE — THERAPY
Occupational Therapy  Daily Treatment     Patient Name: Peg Louie  Age:  76 y.o., Sex:  female  Medical Record #: [id number]  Today's Date: 7/12/2021     Precautions  Precautions: Fall Risk, Spinal / Back Precautions , TLSO (Thoracolumbosacral orthosis)  Comments: Fall Risk;Spinal / Back Precautions ;TLSO (Thoracolumbosacral orthosis)         Subjective       Objective       07/12/21 1401   Precautions   Precautions Fall Risk;Spinal / Back Precautions ;TLSO (Thoracolumbosacral orthosis)   Comments Fall Risk;Spinal / Back Precautions ;TLSO (Thoracolumbosacral orthosis)   Vitals   O2 (LPM) 2   O2 Delivery Device Silicone Nasal Cannula   Pain   Intervention Nurse Notified   Pain 0 - 10 Group   Location Back   Location Orientation Mid   Description Aching   Comfort Goal Perform Activity   Therapist Pain Assessment 4;Nurse Notified;Post Activity Pain Same as Prior to Activity   Non Verbal Descriptors   Non Verbal Scale  Calm   Cognition    Level of Consciousness Alert   Sleep/Wake Cycle   Sleep & Rest Awake   Functional Level of Assist   Upper Body Dressing Minimal Assist   Upper Body Dressing Description Application of orthotic or brace  (Min assist to don/doff TLSO)   Toileting Stand by Assist   Toileting Description Grab bar;Increased time;Initial preparation for task;Supervision for safety;Verbal cueing   Toilet Transfers Stand by Assist  (4WW + O2/concentrator)   Toilet Transfer Description Increased time;Set-up of equipment;Supervision for safety;Verbal cueing   Sitting Upper Body Exercises   Tricep Press 3 sets of 10  (Rickshaw:Wwylify1h33@25#'s;Ibpdpga4o22@20#'s1x10@25#'s)   Sitting Lower Body Exercises   Nustep Resistance Level 2  (20 mins, rest break x 2, .69 miles)   Interdisciplinary Plan of Care Collaboration   IDT Collaboration with  Nursing;Recreation Therapist   Patient Position at End of Therapy Seated;Self Releasing Lap Belt Applied   Collaboration Comments CLOF, Meds   OT Total Time Spent    OT Individual Total Time Spent (Mins) 60   OT Charge Group   OT Self Care / ADL 1   OT Therapeutic Exercise  3       Assessment    Pt was alert and cooperative w/ tx.  Tx emphasis on ADL's at 4WW level + TherEx - see notes above for details.  Strengths: Able to follow instructions, Alert and oriented, Effective communication skills, Good insight into deficits/needs, Independent prior level of function, Making steady progress towards goals, Motivated for self care and independence, Pleasant and cooperative, Supportive family, Willingly participates in therapeutic activities  Barriers: Generalized weakness, Impaired activity tolerance, Impaired balance, Pain, Pain poorly managed    Plan    Endurance, UE strengthening, balance, functional transfers/mobility    Passport items to be completed:  Passport items to be completed:  Perform bathroom transfers, complete dressing, complete feeding, get ready for the day, prepare a simple meal, participate in household tasks, adapt home for safety needs, demonstrate home exercise program, complete caregiver training     Occupational Therapy Goals (Active)     Problem: Bathing     Dates: Start: 07/10/21       Goal: STG-Within one week, patient will bathe with supervision using AE and DME as needed.     Dates: Start: 07/10/21             Problem: Dressing     Dates: Start: 07/10/21       Goal: STG-Within one week, patient will dress LB with min A using AE as needed.     Dates: Start: 07/10/21             Problem: Functional Transfers     Dates: Start: 07/10/21       Goal: STG-Within one week, patient will transfer to toilet with supervision.     Dates: Start: 07/10/21             Problem: OT Long Term Goals     Dates: Start: 07/10/21       Goal: LTG-By discharge, patient will complete basic self care tasks with mod I to supervision.      Dates: Start: 07/10/21          Goal: LTG-By discharge, patient will perform bathroom transfers with mod I to supervision.     Dates: Start:  07/10/21             Problem: Toileting     Dates: Start: 07/10/21       Goal: STG-Within one week, patient will complete toileting tasks with min A.     Dates: Start: 07/10/21

## 2021-07-12 NOTE — ASSESSMENT & PLAN NOTE
"Denies chest pain, palpitations, or calf pain  On O2 supplementation  Covid: negative 7/9/21  CXR (7/10): Ill-defined right perihilar opacity could represent atelectasis versus pneumonitis  CXR (7/12): unchanged  S/P Zosyn  S/P Omnicef (7/14)  2nd to pneumonia and/or having had \"shallow breathing from pain\"  Encouraging IS  Will check CXR  "

## 2021-07-12 NOTE — CONSULTS
HOSPITAL MEDICINE CONSULTATION    Requesting Physician:  Dr. Penn    Reason for Consult:  Possible Pneumonia    History of Present Illness:  The patient is a 76-year-old  female with past medical history significant for hypertension and gluten sensitivity.  She is visiting from [place] and had an accidental fall out of her camper, landing onto her back.  The patient was transferred from [facility] to [facility] for a higher level of care on 7/6/21.  She was found to have a T7 spinous process fracture and T8 wedge fracture, for which Neurosurgery recommended conservative management for both.  Due to her ongoing functional debility, the patient was transferred to [facility] on 7/9/21.  Hospital Medicine consultation is requested on 7/12/21 to assist in the management of this patient's hypoxia.  On 7/10/21, the patient had increasing oxygen requirements with chest radiogram suspicious for pneumonitis, so she was started on Zosyn by Dr. Templeton.  She is also noted to have anemia, thrombocytopenia, and hypokalemia.    Review of Systems:  Review of Systems   Constitutional: Negative for chills, fever and malaise/fatigue.   HENT: Negative.    Eyes: Negative.    Respiratory: Negative for cough and shortness of breath.    Cardiovascular: Negative for chest pain and palpitations.   Gastrointestinal: Negative for abdominal pain, nausea and vomiting.   Genitourinary: Negative.    Musculoskeletal: Positive for back pain.   Skin: Negative for itching and rash.   Endo/Heme/Allergies: Negative for polydipsia. Does not bruise/bleed easily.   All other systems reviewed and are negative.      Allergies:  Allergies   Allergen Reactions   • Gluten Meal        Medications:    Current Facility-Administered Medications:   •  potassium chloride SA (Kdur) tablet 20 mEq, 20 mEq, Oral, DAILY, Sugey Penn M.D.  •  cefdinir (OMNICEF) capsule 300 mg, 300 mg, Oral, Q12HRS, Moon  JUDY Alvarado  •  atenolol (TENORMIN) tablet 50 mg, 50 mg, Oral, Daily-0600, Travis Templeton M.D., 50 mg at 07/12/21 0913  •  Respiratory Therapy Consult, , Nebulization, Continuous RT, Sugey Penn M.D.  •  oxyCODONE immediate-release (ROXICODONE) tablet 5 mg, 5 mg, Oral, Q3HRS PRN, 5 mg at 07/12/21 0614 **OR** oxyCODONE immediate release (ROXICODONE) tablet 10 mg, 10 mg, Oral, Q3HRS PRN, Sugey Penn M.D., 10 mg at 07/12/21 0904  •  hydrALAZINE (APRESOLINE) tablet 25 mg, 25 mg, Oral, Q8HRS PRN, Sugey Penn M.D.  •  acetaminophen (Tylenol) tablet 650 mg, 650 mg, Oral, Q4HRS PRN, Sugey Penn M.D.  •  senna-docusate (PERICOLACE or SENOKOT S) 8.6-50 MG per tablet 2 tablet, 2 tablet, Oral, BID, 2 tablet at 07/12/21 0904 **AND** polyethylene glycol/lytes (MIRALAX) PACKET 1 Packet, 1 Packet, Oral, QDAY PRN **AND** magnesium hydroxide (MILK OF MAGNESIA) suspension 30 mL, 30 mL, Oral, QDAY PRN **AND** bisacodyl (DULCOLAX) suppository 10 mg, 10 mg, Rectal, QDAY PRN, Sugey Penn M.D.  •  artificial tears ophthalmic solution 1 Drop, 1 Drop, Both Eyes, PRN, Sugey Penn M.D.  •  benzocaine-menthol (CEPACOL) lozenge 1 Lozenge, 1 Lozenge, Mouth/Throat, Q2HRS PRN, Sugey Penn M.D.  •  mag hydrox-al hydrox-simeth (MAALOX PLUS ES or MYLANTA DS) suspension 20 mL, 20 mL, Oral, Q2HRS PRN, Sugey Penn M.D.  •  ondansetron (ZOFRAN ODT) dispertab 4 mg, 4 mg, Oral, 4X/DAY PRN **OR** ondansetron (ZOFRAN) syringe/vial injection 4 mg, 4 mg, Intramuscular, 4X/DAY PRN, Sugey Penn M.D.  •  traZODone (DESYREL) tablet 50 mg, 50 mg, Oral, QHS PRN, Sugey Penn M.D.  •  sodium chloride (OCEAN) 0.65 % nasal spray 2 Spray, 2 Spray, Nasal, PRN, Sugey Penn M.D.  •  heparin injection 5,000 Units, 5,000 Units, Subcutaneous, Q8HRS, Sugey Penn M.D., 5,000 Units at 07/12/21 0604  •  diazePAM (VALIUM) tablet 5  mg, 5 mg, Oral, Q6HRS PRN, Sugey Penn M.D.  •  gabapentin (NEURONTIN) capsule 100 mg, 100 mg, Oral, BID, Sugey Penn M.D., 100 mg at 07/12/21 0543  •  methocarbamol (ROBAXIN) tablet 750 mg, 750 mg, Oral, 4X/DAY, Sugey Penn M.D., 750 mg at 07/12/21 0904  •  traMADol (ULTRAM) 50 MG tablet 50 mg, 50 mg, Oral, Q6HRS PRN, Sugey Penn M.D., 50 mg at 07/10/21 1221    Past Medical/Surgical History:  Past Medical History:   Diagnosis Date   • Irregular heart beat      Past Surgical History:   Procedure Laterality Date   • CATARACT EXTRACTION WITH IOL      both eyes   • OTHER ORTHOPEDIC SURGERY      left ankle and bilateral hand surgeries   • PRIMARY C SECTION      x 1       Social History:  Social History     Socioeconomic History   • Marital status:      Spouse name: Not on file   • Number of children: Not on file   • Years of education: Not on file   • Highest education level: Not on file   Occupational History   • Not on file   Tobacco Use   • Smoking status: Never Smoker   • Smokeless tobacco: Never Used   Vaping Use   • Vaping Use: Never used   Substance and Sexual Activity   • Alcohol use: Yes     Comment: 0nce or twice a week   • Drug use: Never   • Sexual activity: Not on file   Other Topics Concern   • Not on file   Social History Narrative   • Not on file     Social Determinants of Health     Financial Resource Strain:    • Difficulty of Paying Living Expenses:    Food Insecurity:    • Worried About Running Out of Food in the Last Year:    • Ran Out of Food in the Last Year:    Transportation Needs:    • Lack of Transportation (Medical):    • Lack of Transportation (Non-Medical):    Physical Activity:    • Days of Exercise per Week:    • Minutes of Exercise per Session:    Stress:    • Feeling of Stress :    Social Connections:    • Frequency of Communication with Friends and Family:    • Frequency of Social Gatherings with Friends and Family:    •  Attends Holiness Services:    • Active Member of Clubs or Organizations:    • Attends Club or Organization Meetings:    • Marital Status:    Intimate Partner Violence:    • Fear of Current or Ex-Partner:    • Emotionally Abused:    • Physically Abused:    • Sexually Abused:        Family History:  The patient is uncertain of her family medical history.    Physical Examination:   Vitals:    07/11/21 1840 07/12/21 0600 07/12/21 0700 07/12/21 0817   BP: 121/52 123/58 138/69    Pulse: 90 (!) 56 61 68   Resp: 18  18 18   Temp: 36.7 °C (98.1 °F)  36.9 °C (98.4 °F)    TempSrc: Oral  Oral    SpO2: 94%  95% 97%   Weight:       Height:           Physical Exam  Vitals reviewed.   Constitutional:       General: She is not in acute distress.     Appearance: Normal appearance. She is not ill-appearing.   HENT:      Head: Normocephalic and atraumatic.      Right Ear: External ear normal.      Left Ear: External ear normal.      Nose: Nose normal.      Mouth/Throat:      Pharynx: Oropharynx is clear.   Eyes:      General:         Right eye: No discharge.         Left eye: No discharge.      Extraocular Movements: Extraocular movements intact.      Conjunctiva/sclera: Conjunctivae normal.   Cardiovascular:      Rate and Rhythm: Normal rate and regular rhythm.   Pulmonary:      Effort: No respiratory distress.      Breath sounds: No wheezing.      Comments: Decreased BS   Abdominal:      General: Bowel sounds are normal. There is no distension.      Palpations: Abdomen is soft.      Tenderness: There is no abdominal tenderness. There is no guarding or rebound.   Musculoskeletal:      Cervical back: Normal range of motion and neck supple.      Right lower leg: No edema.      Left lower leg: No edema.   Skin:     General: Skin is warm and dry.   Neurological:      Mental Status: She is alert and oriented to person, place, and time.         Laboratory Data:  Recent Labs     07/10/21  0531 07/12/21  0558   WBC 4.8 5.3   RBC 3.67* 3.73*  "  HEMOGLOBIN 10.0* 10.2*   HEMATOCRIT 31.1* 31.8*   MCV 84.7 85.3   MCH 27.2 27.3   MCHC 32.2* 32.1*   RDW 45.9 46.2   PLATELETCT 142* 163*   MPV 10.4 10.3     Recent Labs     07/10/21  0531 07/12/21  0558   SODIUM 132* 135   POTASSIUM 3.8 3.4*   CHLORIDE 97 98   CO2 25 28   GLUCOSE 86 91   BUN 22 14   CREATININE 0.64 0.75   CALCIUM 8.7 9.2           Impressions/Recommendations:  Thrombocytopenia (HCC)  Follow Platelet counts    HTN (hypertension)  Observe blood pressure trends on Atenolol    Anemia  Has normocytic indices  Check Fe Panel w/ next draw  Follow H/H    Hypokalemia  On KCl per Primary Service  Check F/U labs in a few days    Trauma  S/P mechanical GLF out of camper onto back  Has T7 spinous process fx and T8 wedge fx  Non-surgical management per Neurosurgery  TLSO when OOB  Pain control per Physiatry    Hypoxia  Pt reports having had \"shallow breathing from pain\"  Denies chest pain, palpitations, or calf pain  On O2 at 2-3 lpm via NC  SARS-CoV-2 PCR negative 7/9/21  CXR 7/10/21 atx vs pneumonitis  Started on Zosyn over the weekend per Dr. Templeton  Check F/U CXR and de-escalate abx to Omnicef  May discontinue abx if F/U CXR negative  Start IS    Full Code    Discussed with Dr. Penn.  Thank you for the opportunity to assist in this patient's care.  We will continue to follow along with you.  "

## 2021-07-12 NOTE — ASSESSMENT & PLAN NOTE
S/P mechanical GLF -- fell out of camper onto back  Has T7 spinous process fx and T8 wedge fx  Non-surgical management per Neurosurgery  TLSO when OOB

## 2021-07-12 NOTE — FLOWSHEET NOTE
07/12/21 0817   Events/Summary/Plan   Events/Summary/Plan 02 spot check with wean to 2 lpm   Vital Signs   Pulse 68   Respiration 18   Pulse Oximetry 97 %   $ Pulse Oximetry (Spot Check) Yes   Respiratory Assessment   Level of Consciousness Alert   Chest Exam   Work Of Breathing / Effort Within Normal Limits   Oxygen   O2 (LPM) 2   O2 Delivery Device Silicone Nasal Cannula

## 2021-07-12 NOTE — CARE PLAN
Safety measures enforced, TLSO in use when oob  to bathroom, voiding freely .  Problem: Pain - Standard  Goal: Alleviation of pain or a reduction in pain to the patient’s comfort goal  Outcome: Progressing  Note: Assessed for pain and discomfort, medicated with oxycodone 5 mg  for back pain at 0140 hrs with relief . Cont monitored.   The patient is Stable - Low risk of patient condition declining or worsening    Shift Goals  Patient Goals: pain control    Progress made toward(s) clinical / shift goals:  Pain relief afforded.     Patient is not progressing towards the following goals:Cont monitored.

## 2021-07-12 NOTE — THERAPY
"Occupational Therapy  Daily Treatment     Patient Name: Peg Louie  Age:  76 y.o., Sex:  female  Medical Record #: [id number]  Today's Date: 7/12/2021     Precautions  Precautions: Fall Risk, Spinal / Back Precautions , TLSO (Thoracolumbosacral orthosis)  Comments: Fall Risk;Spinal / Back Precautions ;TLSO (Thoracolumbosacral orthosis)         Subjective    \"I'm going to [place] to stay with my daughter until I can tolerate the flight back to [place].\"     \"My daughter's tub is similar to mine, but it does not have grab bars.\"     Objective       07/12/21 0931   Vitals   Pulse 96   Pulse Oximetry 92 %   O2 (LPM) 2   O2 Delivery Device Silicone Nasal Cannula   Functional Level of Assist   Bed, Chair, Wheelchair Transfer Stand by Assist   Tub / Shower Transfers Contact Guard Assist  (simulated home set up with tub, ttb, and grab bar)   Tub Shower Transfer Description   (pt has gb at her house, but not daughters, will need ttb)   IADL Treatments   IADL Treatments Home management;Kitchen mobility education   Kitchen Mobility Education Education on kitchen mobility including having family move items to counter to increase independence, energy conservation, recommendation not to use oven until she is safer with bending her knees or spinal precautions are gone (pt does have a small appliance oven she can use for now), practiced getting items out of fridge including door and middle shelf, education on not retrieving from bottomw shelf yet, also recommended family move overhead items to counter as pt reports pain when lifting arms too high.    Balance   Comments pt walked to/from ADL kitchen with 4WW at SBA level. Provided education on energy conservation.   Interdisciplinary Plan of Care Collaboration   IDT Collaboration with  Nursing   Patient Position at End of Therapy In Bed;Call Light within Reach;Tray Table within Reach;Phone within Reach   Collaboration Comments re: pt reporting high pain   OT Total Time Spent   OT " Individual Total Time Spent (Mins) 30   OT Charge Group   OT Self Care / ADL 1   OT Therapy Activity 1     Pt also retrieved, filled, and delivered cup 10 feet away. Pt with good problem solving AEB using 4WW to deliver item.      Assessment    Pt taking shallow breaths throughout session, however, pulse ox reading WFL. Pt is receptive to education and demonstrates good carry over of techniques learned in session.    Strengths: Able to follow instructions, Alert and oriented, Effective communication skills, Good insight into deficits/needs, Independent prior level of function, Making steady progress towards goals, Motivated for self care and independence, Pleasant and cooperative, Supportive family, Willingly participates in therapeutic activities  Barriers: Generalized weakness, Impaired activity tolerance, Impaired balance, Pain, Pain poorly managed    Plan    Endurance, UE strengthening, balance, functional transfers/mobility    Passport items to be completed:  Perform bathroom transfers, complete dressing, complete feeding, get ready for the day, prepare a simple meal, participate in household tasks, adapt home for safety needs, demonstrate home exercise program, complete caregiver training     Occupational Therapy Goals (Active)     Problem: Bathing     Dates: Start: 07/10/21       Goal: STG-Within one week, patient will bathe with supervision using AE and DME as needed.     Dates: Start: 07/10/21             Problem: Dressing     Dates: Start: 07/10/21       Goal: STG-Within one week, patient will dress LB with min A using AE as needed.     Dates: Start: 07/10/21             Problem: Functional Transfers     Dates: Start: 07/10/21       Goal: STG-Within one week, patient will transfer to toilet with supervision.     Dates: Start: 07/10/21             Problem: OT Long Term Goals     Dates: Start: 07/10/21       Goal: LTG-By discharge, patient will complete basic self care tasks with mod I to supervision.       Dates: Start: 07/10/21          Goal: LTG-By discharge, patient will perform bathroom transfers with mod I to supervision.     Dates: Start: 07/10/21             Problem: Toileting     Dates: Start: 07/10/21       Goal: STG-Within one week, patient will complete toileting tasks with min A.     Dates: Start: 07/10/21

## 2021-07-13 PROCEDURE — 700101 HCHG RX REV CODE 250: Performed by: PHYSICAL MEDICINE & REHABILITATION

## 2021-07-13 PROCEDURE — 97110 THERAPEUTIC EXERCISES: CPT

## 2021-07-13 PROCEDURE — 700102 HCHG RX REV CODE 250 W/ 637 OVERRIDE(OP): Performed by: HOSPITALIST

## 2021-07-13 PROCEDURE — A9270 NON-COVERED ITEM OR SERVICE: HCPCS | Performed by: PHYSICAL MEDICINE & REHABILITATION

## 2021-07-13 PROCEDURE — 97530 THERAPEUTIC ACTIVITIES: CPT

## 2021-07-13 PROCEDURE — 97116 GAIT TRAINING THERAPY: CPT

## 2021-07-13 PROCEDURE — A9270 NON-COVERED ITEM OR SERVICE: HCPCS | Performed by: HOSPITALIST

## 2021-07-13 PROCEDURE — 700111 HCHG RX REV CODE 636 W/ 250 OVERRIDE (IP): Performed by: PHYSICAL MEDICINE & REHABILITATION

## 2021-07-13 PROCEDURE — 770010 HCHG ROOM/CARE - REHAB SEMI PRIVAT*

## 2021-07-13 PROCEDURE — 99232 SBSQ HOSP IP/OBS MODERATE 35: CPT | Performed by: HOSPITALIST

## 2021-07-13 PROCEDURE — 97535 SELF CARE MNGMENT TRAINING: CPT

## 2021-07-13 PROCEDURE — 700102 HCHG RX REV CODE 250 W/ 637 OVERRIDE(OP): Performed by: PHYSICAL MEDICINE & REHABILITATION

## 2021-07-13 PROCEDURE — 99233 SBSQ HOSP IP/OBS HIGH 50: CPT | Performed by: PHYSICAL MEDICINE & REHABILITATION

## 2021-07-13 PROCEDURE — 94760 N-INVAS EAR/PLS OXIMETRY 1: CPT

## 2021-07-13 RX ORDER — BACLOFEN 10 MG/1
10 TABLET ORAL 3 TIMES DAILY
Status: DISCONTINUED | OUTPATIENT
Start: 2021-07-13 | End: 2021-07-13

## 2021-07-13 RX ORDER — BACLOFEN 10 MG/1
10 TABLET ORAL 3 TIMES DAILY
Status: DISCONTINUED | OUTPATIENT
Start: 2021-07-13 | End: 2021-07-16

## 2021-07-13 RX ADMIN — OXYCODONE HYDROCHLORIDE 10 MG: 10 TABLET ORAL at 11:41

## 2021-07-13 RX ADMIN — OXYCODONE HYDROCHLORIDE 10 MG: 10 TABLET ORAL at 21:19

## 2021-07-13 RX ADMIN — SENNOSIDES AND DOCUSATE SODIUM 2 TABLET: 8.6; 5 TABLET ORAL at 08:06

## 2021-07-13 RX ADMIN — SENNOSIDES AND DOCUSATE SODIUM 2 TABLET: 8.6; 5 TABLET ORAL at 21:12

## 2021-07-13 RX ADMIN — GABAPENTIN 100 MG: 100 CAPSULE ORAL at 05:24

## 2021-07-13 RX ADMIN — ENOXAPARIN SODIUM 40 MG: 40 INJECTION SUBCUTANEOUS at 21:12

## 2021-07-13 RX ADMIN — ATENOLOL 50 MG: 25 TABLET ORAL at 05:25

## 2021-07-13 RX ADMIN — BACLOFEN 10 MG: 10 TABLET ORAL at 21:13

## 2021-07-13 RX ADMIN — OXYCODONE HYDROCHLORIDE 10 MG: 10 TABLET ORAL at 05:25

## 2021-07-13 RX ADMIN — OXYCODONE HYDROCHLORIDE 10 MG: 10 TABLET ORAL at 15:37

## 2021-07-13 RX ADMIN — LIDOCAINE 2 PATCH: 50 PATCH TOPICAL at 08:05

## 2021-07-13 RX ADMIN — OXYCODONE HYDROCHLORIDE 10 MG: 10 TABLET ORAL at 00:38

## 2021-07-13 RX ADMIN — BACLOFEN 10 MG: 10 TABLET ORAL at 15:37

## 2021-07-13 RX ADMIN — POTASSIUM CHLORIDE 20 MEQ: 1500 TABLET, EXTENDED RELEASE ORAL at 08:06

## 2021-07-13 RX ADMIN — GABAPENTIN 100 MG: 100 CAPSULE ORAL at 17:36

## 2021-07-13 RX ADMIN — CEFDINIR 300 MG: 300 CAPSULE ORAL at 08:06

## 2021-07-13 RX ADMIN — CEFDINIR 300 MG: 300 CAPSULE ORAL at 21:12

## 2021-07-13 RX ADMIN — HEPARIN SODIUM 5000 UNITS: 5000 INJECTION, SOLUTION INTRAVENOUS; SUBCUTANEOUS at 05:25

## 2021-07-13 RX ADMIN — BACLOFEN 10 MG: 10 TABLET ORAL at 11:39

## 2021-07-13 RX ADMIN — OXYCODONE HYDROCHLORIDE 10 MG: 10 TABLET ORAL at 08:29

## 2021-07-13 RX ADMIN — METHOCARBAMOL TABLETS 750 MG: 750 TABLET, COATED ORAL at 08:06

## 2021-07-13 ASSESSMENT — ACTIVITIES OF DAILY LIVING (ADL)
BED_CHAIR_WHEELCHAIR_TRANSFER_DESCRIPTION: ADAPTIVE EQUIPMENT;INCREASED TIME;SUPERVISION FOR SAFETY;VERBAL CUEING
TOILET_TRANSFER_DESCRIPTION: ADAPTIVE EQUIPMENT;GRAB BAR;SUPERVISION FOR SAFETY
BED_CHAIR_WHEELCHAIR_TRANSFER_DESCRIPTION: ADAPTIVE EQUIPMENT;INCREASED TIME;SUPERVISION FOR SAFETY;VERBAL CUEING
BED_CHAIR_WHEELCHAIR_TRANSFER_DESCRIPTION: ADAPTIVE EQUIPMENT;INCREASED TIME;SUPERVISION FOR SAFETY;VERBAL CUEING

## 2021-07-13 ASSESSMENT — GAIT ASSESSMENTS
DISTANCE (FEET): 475
ASSISTIVE DEVICE: 4 WHEEL WALKER
GAIT LEVEL OF ASSIST: STAND BY ASSIST
DEVIATION: ANTALGIC;DECREASED BASE OF SUPPORT;BRADYKINETIC;DECREASED HEEL STRIKE
ASSISTIVE DEVICE: 4 WHEEL WALKER
DEVIATION: ANTALGIC;DECREASED BASE OF SUPPORT;BRADYKINETIC;DECREASED HEEL STRIKE
ASSISTIVE DEVICE: 4 WHEEL WALKER
DISTANCE (FEET): 460
GAIT LEVEL OF ASSIST: STAND BY ASSIST
DISTANCE (FEET): 460
DEVIATION: ANTALGIC;DECREASED BASE OF SUPPORT;BRADYKINETIC;DECREASED HEEL STRIKE
GAIT LEVEL OF ASSIST: STAND BY ASSIST

## 2021-07-13 ASSESSMENT — ENCOUNTER SYMPTOMS
ABDOMINAL PAIN: 0
VOMITING: 0
NERVOUS/ANXIOUS: 0
SHORTNESS OF BREATH: 0
DIARRHEA: 0
FEVER: 0
NAUSEA: 0
CHILLS: 0

## 2021-07-13 NOTE — CARE PLAN
Problem: Bathing  Goal: STG-Within one week, patient will bathe with supervision using AE and DME as needed.  Outcome: Not Met  Note: Requires Min A     Problem: Dressing  Goal: STG-Within one week, patient will dress LB with min A using AE as needed.  Outcome: Met     Problem: Toileting  Goal: STG-Within one week, patient will complete toileting tasks with min A.  Outcome: Met     Problem: Functional Transfers  Goal: STG-Within one week, patient will transfer to toilet with supervision.  Outcome: Not Met  Note: Requires SBA

## 2021-07-13 NOTE — CARE PLAN
Problem: Knowledge Deficit - Standard  Goal: Patient and family/care givers will demonstrate understanding of plan of care, disease process/condition, diagnostic tests and medications  Outcome: Progressing  Note: Plan of care . Pain mgt, safety measures, reviewed. Verbalized understanding . Last bm was 7/12, miralax given . Cont monitored.     Problem: Pain - Standard  Goal: Alleviation of pain or a reduction in pain to the patient’s comfort goal  Outcome: Progressing  Note: Assessed for pain and discomfort ,pt assisted oob to bathroom , TLSO in use when OOB , medicated with oxycodone for back pain with relief [ see mar ]Cont monitored.   The patient is Stable - Low risk of patient condition declining or worsening    Shift Goals  Patient Goals: pain control    Progress made toward(s) clinical / shift goals:  pain under control .    Patient is not progressing towards the following goals:Pt last bm 7/10  Medicated with miralax.

## 2021-07-13 NOTE — CARE PLAN
Problem: Balance  Goal: STG-Within one week, patient will maintain dynamic standing perform Harris balance  Outcome: Not Met     Problem: Mobility  Goal: STG-Within one week, patient will ambulate up/down a curb with LRAD CGA  Outcome: Not Met     Problem: Mobility Transfers  Goal: STG-Within one week, patient will transfer bed to chair Josh w/c<>bed in room  Outcome: Progressing     Problem: Mobility  Goal: STG-Within one week, patient will ambulate household distance with FWW vs 4WW x100'  Outcome: Met

## 2021-07-13 NOTE — PROGRESS NOTES
"Rehab Progress Note     Encounter Date: 7/13/2021    CC: back pain, knee pain    Interval Events (Subjective)  Patient sitting up in room. She reports her back pain is causing muscle spasms. She reports the muscle relaxant is not enough. She reports lidocaine patch caused only small improvement. Discussed would switch to different muscle relaxant. Otherwise discussed that we would have IDT later today.     IDT Team Meeting 7/13/2021    ISugey M.D., was present and led the interdisciplinary team conference on 7/13/2021.  I led the IDT conference and agree with the IDT conference documentation and plan of care as noted below.     RN:  Diet Regular   % Meal     Pain Oxycodone   Sleep    Bowel Continent   Bladder    In's & Out's      PT:  Bed Mobility    Transfers SBA-CGA   Mobility Eze   Stairs    Limited by Pain but motivated  Does poorly sitting     OT:  Eating    Grooming    Bathing    UB Dressing Eze   LB Dressing    Toileting Eze   Shower & Transfer    Pain limited and activity tolerance limited    Resp:  2L    CM:  Continues to work on disposition and DME needs.      DC/Disposition:  7/20/21    Objective:  VITAL SIGNS: /66   Pulse 67   Temp 36.5 °C (97.7 °F) (Oral)   Resp 18   Ht 1.676 m (5' 6\")   Wt 107 kg (235 lb 14.3 oz)   SpO2 93%   BMI 38.07 kg/m²   Gen: NAD  Psych: Mood and affect appropriate  CV: RRR, no edema  Resp: CTAB, no upper airway sounds  Abd: NTND  Neuro: AOx4, 5/5 BUE    Recent Results (from the past 72 hour(s))   CBC WITH DIFFERENTIAL    Collection Time: 07/12/21  5:58 AM   Result Value Ref Range    WBC 5.3 4.8 - 10.8 K/uL    RBC 3.73 (L) 4.20 - 5.40 M/uL    Hemoglobin 10.2 (L) 12.0 - 16.0 g/dL    Hematocrit 31.8 (L) 37.0 - 47.0 %    MCV 85.3 81.4 - 97.8 fL    MCH 27.3 27.0 - 33.0 pg    MCHC 32.1 (L) 33.6 - 35.0 g/dL    RDW 46.2 35.9 - 50.0 fL    Platelet Count 163 (L) 164 - 446 K/uL    MPV 10.3 9.0 - 12.9 fL    Neutrophils-Polys 55.30 44.00 - 72.00 %    " Lymphocytes 35.20 22.00 - 41.00 %    Monocytes 6.60 0.00 - 13.40 %    Eosinophils 1.70 0.00 - 6.90 %    Basophils 0.60 0.00 - 1.80 %    Immature Granulocytes 0.60 0.00 - 0.90 %    Nucleated RBC 0.00 /100 WBC    Neutrophils (Absolute) 2.92 2.00 - 7.15 K/uL    Lymphs (Absolute) 1.86 1.00 - 4.80 K/uL    Monos (Absolute) 0.35 0.00 - 0.85 K/uL    Eos (Absolute) 0.09 0.00 - 0.51 K/uL    Baso (Absolute) 0.03 0.00 - 0.12 K/uL    Immature Granulocytes (abs) 0.03 0.00 - 0.11 K/uL    NRBC (Absolute) 0.00 K/uL   Comp Metabolic Panel    Collection Time: 07/12/21  5:58 AM   Result Value Ref Range    Sodium 135 135 - 145 mmol/L    Potassium 3.4 (L) 3.6 - 5.5 mmol/L    Chloride 98 96 - 112 mmol/L    Co2 28 20 - 33 mmol/L    Anion Gap 9.0 7.0 - 16.0    Glucose 91 65 - 99 mg/dL    Bun 14 8 - 22 mg/dL    Creatinine 0.75 0.50 - 1.40 mg/dL    Calcium 9.2 8.5 - 10.5 mg/dL    AST(SGOT) 28 12 - 45 U/L    ALT(SGPT) 23 2 - 50 U/L    Alkaline Phosphatase 82 30 - 99 U/L    Total Bilirubin 0.5 0.1 - 1.5 mg/dL    Albumin 3.7 3.2 - 4.9 g/dL    Total Protein 6.6 6.0 - 8.2 g/dL    Globulin 2.9 1.9 - 3.5 g/dL    A-G Ratio 1.3 g/dL   ESTIMATED GFR    Collection Time: 07/12/21  5:58 AM   Result Value Ref Range    GFR If African American >60 >60 mL/min/1.73 m 2    GFR If Non African American >60 >60 mL/min/1.73 m 2       Current Facility-Administered Medications   Medication Frequency   • potassium chloride SA (Kdur) tablet 20 mEq DAILY   • cefdinir (OMNICEF) capsule 300 mg Q12HRS   • lidocaine (LIDODERM) 5 % 2 Patch DAILY   • atenolol (TENORMIN) tablet 50 mg Daily-0600   • Respiratory Therapy Consult Continuous RT   • oxyCODONE immediate-release (ROXICODONE) tablet 5 mg Q3HRS PRN    Or   • oxyCODONE immediate release (ROXICODONE) tablet 10 mg Q3HRS PRN   • hydrALAZINE (APRESOLINE) tablet 25 mg Q8HRS PRN   • acetaminophen (Tylenol) tablet 650 mg Q4HRS PRN   • senna-docusate (PERICOLACE or SENOKOT S) 8.6-50 MG per tablet 2 tablet BID    And   •  polyethylene glycol/lytes (MIRALAX) PACKET 1 Packet QDAY PRN    And   • magnesium hydroxide (MILK OF MAGNESIA) suspension 30 mL QDAY PRN    And   • bisacodyl (DULCOLAX) suppository 10 mg QDAY PRN   • artificial tears ophthalmic solution 1 Drop PRN   • benzocaine-menthol (CEPACOL) lozenge 1 Lozenge Q2HRS PRN   • mag hydrox-al hydrox-simeth (MAALOX PLUS ES or MYLANTA DS) suspension 20 mL Q2HRS PRN   • ondansetron (ZOFRAN ODT) dispertab 4 mg 4X/DAY PRN    Or   • ondansetron (ZOFRAN) syringe/vial injection 4 mg 4X/DAY PRN   • traZODone (DESYREL) tablet 50 mg QHS PRN   • sodium chloride (OCEAN) 0.65 % nasal spray 2 Spray PRN   • heparin injection 5,000 Units Q8HRS   • diazePAM (VALIUM) tablet 5 mg Q6HRS PRN   • gabapentin (NEURONTIN) capsule 100 mg BID   • methocarbamol (ROBAXIN) tablet 750 mg 4X/DAY   • traMADol (ULTRAM) 50 MG tablet 50 mg Q6HRS PRN       Orders Placed This Encounter   Procedures   • Diet Order Diet: Regular     Standing Status:   Standing     Number of Occurrences:   1     Order Specific Question:   Diet:     Answer:   Regular [1]       Assessment:  Active Hospital Problems    Diagnosis    • *Spinal fracture of T8 vertebra (HCC)    • Hypokalemia    • Trauma    • Hypoxia    • Thrombocytopenia (HCC)    • Anemia    • Debility    • HTN (hypertension)    • Obesity    • Hyponatremia        Medical Decision Making and Plan:  T8 wedge fracture - Patient with mechanical fall out of [facility] with T8 wedge fracture managed non-operatively. TLSO when EOB or when supine if too painful.   -PT and OT for mobility and ADLs  -Follow-up with NSG     Pain Control - Patient on Gabapentin 100 mg BID and PRN Oxycodone. Also on Robaxin 750 mg QID  -Add Lidocaine patches.   -Change Robaxin to Baclofen 10 mg TID   -Consider increase in Gabapentin     HTN/Arrhythmia - Patient on Atenolol 50 mg daily      Anemia - Check AM CBC - 10.2 stable.      Thrombocytopenia - Check AM CBC - 163, improving.      Hyponatremia  - Check AM CMP -  135, improving. Recheck      Morbid Obesity due to excess calories - Patient with BMI of 37.0 on admission, meeting medical criteria. Dietitian to consult.      DVT Ppx - Patient on Heparin on transfer.     Total time:  36 minutes.  I spent greater than 50% of the time for patient care and coordination on this date, including unit/floor time, and face-to-face time with the patient as per assessment and plan above.  Discussion included pain control, lidocaine patches minimal improvement, muscle spasms, start baclofen and discharge planning. Patient was discussed separately in IDT today; please see details above.    Sugey Penn M.D.

## 2021-07-13 NOTE — THERAPY
"Occupational Therapy  Daily Treatment     Patient Name: Peg Louie  Age:  76 y.o., Sex:  female  Medical Record #: [id number]  Today's Date: 7/13/2021     Precautions  Precautions: Fall Risk, Spinal / Back Precautions , TLSO (Thoracolumbosacral orthosis)  Comments: TLSO EOB         Subjective    \"We will get any equipment that we need to make sure she feels comfortable, or we can stay somewhere else for a couple of weeks that is more accessible if we need to.\" daughter reported about d/c home      Objective       07/13/21 0931   Precautions   Precautions Fall Risk;Spinal / Back Precautions ;TLSO (Thoracolumbosacral orthosis)   Comments TLSO EOB   Vitals   O2 (LPM) 2   O2 Delivery Device Nasal Cannula   Cognition    Level of Consciousness Alert   Interdisciplinary Plan of Care Collaboration   IDT Collaboration with  Family / Caregiver   Patient Position at End of Therapy Family / Friend in Room;Other (Comments)  (standing in gym with daughter present waiting for PT)   Collaboration Comments bathroom set-up and safety; d/c planning   OT Total Time Spent   OT Individual Total Time Spent (Mins) 30   OT Charge Group   OT Self Care / ADL 2       Assessment    Pt tolerated session well. Pt ambulated to therapy gym to discuss and visualize/practice tub shower xfer's with pt and daughter present. Daughter is in town from [place] and will be staying here in [place] until pt d/c home. Pt will need to be able to tolerate 1.5 hours on the plane in order to get back to [place] with daughter. Daughter rents a place but is confident that landlord will be able to accommodate needs for pt I.e. grab bars in shower. There are no steps to get inside home, just pavers outside the front door. Daughter has tub shower and plans to get TTB and hand held shower head, discussed grab bars for shower- further education on grab bar placement will be needed. Pt practiced dry TTB 2x with daughter present with supervision. Pt plans to stay with daughter " in [place] until safe to return home to [place], pt has a lot of support in [place] and family that lives very close by. 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 Daughter reported that she is willing to book another place for pt and her to stay that is more accessible for a couple of weeks if needed.     Strengths: Able to follow instructions, Alert and oriented, Effective communication skills, Good insight into deficits/needs, Independent prior level of function, Making steady progress towards goals, Motivated for self care and independence, Pleasant and cooperative, Supportive family, Willingly participates in therapeutic activities  Barriers: Generalized weakness, Impaired activity tolerance, Impaired balance, Pain, Pain poorly managed    Plan    Endurance, UE strengthening, balance, functional transfers/mobility     Passport items to be completed:  Perform bathroom transfers, complete dressing, complete feeding, get ready for the day, prepare a simple meal, participate in household tasks, adapt home for safety needs, demonstrate home exercise program, complete caregiver training     Occupational Therapy Goals (Active)     Problem: Bathing     Dates: Start: 07/10/21       Goal: STG-Within one week, patient will bathe with supervision using AE and DME as needed.     Dates: Start: 07/10/21       Goal Note filed on 07/13/21 1318 by Alejandra Wan, MS,OTR/L     Requires Min A               Problem: Functional Transfers     Dates: Start: 07/10/21       Goal: STG-Within one week, patient will transfer to toilet with supervision.     Dates: Start: 07/10/21       Goal Note filed on 07/13/21 1318 by Alejandra Wan MS,OTR/L     Requires SBA               Problem: IADL's     Dates: Start: 07/13/21       Goal: STG-Within one week, patient will prepare a meal with SBA at 4WW level     Dates: Start: 07/13/21             Problem: OT Long Term Goals     Dates: Start:  07/10/21       Goal: LTG-By discharge, patient will complete basic self care tasks with mod I to supervision.      Dates: Start: 07/10/21          Goal: LTG-By discharge, patient will perform bathroom transfers with mod I to supervision.     Dates: Start: 07/10/21

## 2021-07-13 NOTE — PROGRESS NOTES
[facility] Medicine Daily Progress Note    Date of Service  7/13/2021    Chief Complaint:  Hypertension  Hypoxia  Pneumonia    Interval History:  No significant events or changes since last visit    Review of Systems  Review of Systems   Constitutional: Negative for chills and fever.   Respiratory: Negative for shortness of breath.    Cardiovascular: Negative for chest pain.   Gastrointestinal: Negative for abdominal pain, diarrhea, nausea and vomiting.   Psychiatric/Behavioral: The patient is not nervous/anxious.         Physical Exam  Temp:  [36.5 °C (97.7 °F)-36.7 °C (98 °F)] 36.5 °C (97.7 °F)  Pulse:  [67-96] 67  Resp:  [18] 18  BP: (129-133)/(63-75) 133/66  SpO2:  [92 %-95 %] 93 %    Physical Exam  Vitals and nursing note reviewed.   Constitutional:       Appearance: Normal appearance.   HENT:      Head: Atraumatic.   Eyes:      Conjunctiva/sclera: Conjunctivae normal.      Pupils: Pupils are equal, round, and reactive to light.   Cardiovascular:      Rate and Rhythm: Normal rate and regular rhythm.      Heart sounds: No murmur heard.     Pulmonary:      Effort: Pulmonary effort is normal.      Breath sounds: No stridor. No wheezing or rales.   Abdominal:      General: There is no distension.      Palpations: Abdomen is soft.      Tenderness: There is no abdominal tenderness.   Musculoskeletal:      Cervical back: Normal range of motion and neck supple.      Right lower leg: No edema.      Left lower leg: No edema.   Skin:     General: Skin is warm and dry.      Findings: No rash.   Neurological:      Mental Status: She is alert and oriented to person, place, and time.   Psychiatric:         Mood and Affect: Mood normal.         Behavior: Behavior normal.         Fluids    Intake/Output Summary (Last 24 hours) at 7/13/2021 0848  Last data filed at 7/12/2021 2005  Gross per 24 hour   Intake 850 ml   Output --   Net 850 ml       Laboratory  Recent Labs     07/12/21  0558   WBC 5.3   RBC 3.73*   HEMOGLOBIN 10.2*  "  HEMATOCRIT 31.8*   MCV 85.3   MCH 27.3   MCHC 32.1*   RDW 46.2   PLATELETCT 163*   MPV 10.3     Recent Labs     07/12/21  0558   SODIUM 135   POTASSIUM 3.4*   CHLORIDE 98   CO2 28   GLUCOSE 91   BUN 14   CREATININE 0.75   CALCIUM 9.2                   Imaging    Assessment/Plan  Hypoxia  Assessment & Plan  Pt reports having had \"shallow breathing from pain\"  Denies chest pain, palpitations, or calf pain  On O2 supplementation  Covid: negative 7/9/21  CXR (7/10): Ill-defined right perihilar opacity could represent atelectasis versus pneumonitis  CXR (7/12): unchanged  S/P Zosyn  On Omnicef (thru 7/14)  Encouraging IS    Trauma  Assessment & Plan  S/P mechanical GLF -- fell out of camper onto back  Has T7 spinous process fx and T8 wedge fx  Non-surgical management per Neurosurgery  TLSO when OOB    Hypokalemia  Assessment & Plan  K+: 3.4  On supplements  Monitor    Anemia- (present on admission)  Assessment & Plan  H&H stable    HTN (hypertension)- (present on admission)  Assessment & Plan  BP ok  On Atenolol  Monitor      "

## 2021-07-13 NOTE — CARE PLAN
Problem: Knowledge Deficit - Standard  Goal: Patient and family/care givers will demonstrate understanding of plan of care, disease process/condition, diagnostic tests and medications  Outcome: Progressing   Pt education given regarding plan of care, pt shows understanding, will continue to reinforce education and continue to monitor.      Problem: Fall Risk - Rehab  Goal: Patient will remain free from falls  Outcome: Progressing   Pt education given regarding fall precautions AND safety measures, pt shows good understanding, has not attempted to self transfer this shift, will continue to reinforce education and continue to monitor.

## 2021-07-13 NOTE — THERAPY
07/13/21 0959   Precautions   Precautions Fall Risk;Spinal / Back Precautions ;TLSO (Thoracolumbosacral orthosis)   Comments TLSO EOB   Pain 0 - 10 Group   Location Back   Location Orientation Mid   Therapist Pain Assessment 6;Prior to Activity;During Activity   Cognition    Cognition / Consciousness WDL   Level of Consciousness Alert   ABS (Agitated Behavior Scale)   Agitated Behavior Scale Performed No   Gait Functional Level of Assist    Gait Level Of Assist Stand by Assist   Assistive Device 4 Wheel Walker   Distance (Feet) 460   # of Times Distance was Traveled 1   Deviation Antalgic;Decreased Base Of Support;Bradykinetic;Decreased Heel Strike   Wheelchair Functional Level of Assist   Wheelchair Assist Minimal Assist   Distance Wheelchair (Feet or Distance) 50   Wheelchair Description Extra time;Assistance with steering;Limited by fatigue;Supervision for safety;Verbal cueing   Stairs Functional Level of Assist   Level of Assist with Stairs Unable to Participate   Transfer Functional Level of Assist   Bed, Chair, Wheelchair Transfer Stand by Assist   Bed Chair Wheelchair Transfer Description Adaptive equipment;Increased time;Supervision for safety;Verbal cueing  (4WW)   Standing Lower Body Exercises   Hip Flexion 1 set of 10;Bilateral   Hip Extension 1 set of 10;Bilateral    Hip Abduction 1 set of 10;Bilateral   Heel Rise 1 set of 10;Bilateral   Mini Squat 1 set of 10;Partial   Bed Mobility    Supine to Sit Stand by Assist   Sit to Supine Stand by Assist   Sit to Stand Stand by Assist   Scooting Stand by Assist   Rolling Supervised   Interdisciplinary Plan of Care Collaboration   IDT Collaboration with  Family / Caregiver   Collaboration Comments CLOF   PT Total Time Spent   PT Individual Total Time Spent (Mins) 30   PT Charge Group   PT Gait Training 1   PT Therapeutic Exercise 1   Physical Therapy   Daily Treatment     Patient Name: Peg Louie  Age:  76 y.o., Sex:  female  Medical Record  #: [id number]  Today's Date: 7/13/2021     Precautions  Precautions: Fall Risk, Spinal / Back Precautions , TLSO (Thoracolumbosacral orthosis)  Comments: TLSO EOB    Subjective    The pt agreed to PT. She reported having spasm pain in her back.      Objective    The pt participated in standing BLE therapeutic exercise //bars 1 set of 10. She also participated in gait training with a 4ww and tolerated 460 ft x 1.     Assessment    The pt reported her pain is best when lying in bed or standing and walking. She is improving in all phases of mobility. Her primary barriers are pain and the spine precautions. The pt is motivated and participates well within her pain limitations.  Strengths: Able to follow instructions, Adequate strength, Effective communication skills, Independent prior level of function, Making steady progress towards goals, Pleasant and cooperative, Motivated for self care and independence, Willingly participates in therapeutic activities  Barriers: Pain, Decreased endurance, Generalized weakness, Impaired balance, Impaired activity tolerance    Plan    Safety education, bed mobility and transfers with spine precautions, gait training, therapeutic exercise    Passport items to be completed:  Passport items to be completed:  Get in/out of bed safely, in/out of a vehicle, safely use mobility device, walk or wheel around home/community, navigate up and down stairs, show how to get up/down from the ground, ensure home is accessible, demonstrate HEP, complete caregiver training    Physical Therapy Problems (Active)     Problem: Balance     Dates: Start: 07/10/21       Goal: STG-Within one week, patient will maintain dynamic standing perform Harris balance     Dates: Start: 07/10/21             Problem: Mobility     Dates: Start: 07/10/21       Goal: STG-Within one week, patient will ambulate household distance with FWW vs 4WW x100'     Dates: Start: 07/10/21          Goal: STG-Within one week, patient will  ambulate up/down a curb with LRAD CGA     Dates: Start: 07/10/21             Problem: Mobility Transfers     Dates: Start: 07/10/21       Goal: STG-Within one week, patient will transfer bed to chair Josh w/c<>bed in room     Dates: Start: 07/10/21             Problem: PT-Long Term Goals     Dates: Start: 07/10/21       Goal: LTG-By discharge, patient will ambulate LRAD x300' Josh indoors     Dates: Start: 07/10/21          Goal: LTG-By discharge, patient will transfer one surface to another Josh with LRAD     Dates: Start: 07/10/21          Goal: LTG-By discharge, patient will perform home exercise program: Josh with amb and strengthening     Dates: Start: 07/10/21          Goal: LTG-By discharge, patient will transfer in/out of a car SPV with LRAD     Dates: Start: 07/10/21

## 2021-07-13 NOTE — THERAPY
"Occupational Therapy  Daily Treatment     Patient Name: Peg Louie  Age:  76 y.o., Sex:  female  Medical Record #: [id number]  Today's Date: 7/13/2021     Precautions  Precautions: Fall Risk, Spinal / Back Precautions , TLSO (Thoracolumbosacral orthosis)  Comments: TLSO EOB         Subjective    \"We want her to stay as long as she needs to. As much as I'd like to have her home and not in a hospital, I know this is what she needs. I don't want her to go home too soon because I will be responsible for her since she is staying in my home.\" Daughter     \"I don't feel I've gotten the hang of the brace yet... oh, she can lay down without it on? Thank you.\" Daughter      Objective       07/13/21 1001   Vitals   Pulse Oximetry 97 %  (after walking for grocery task)   O2 (LPM) 2   O2 Delivery Device Nasal Cannula   Pain   Intervention   (adjusted TLSO and education on how to properly don it )   Pain 0 - 10 Group   Location Back   Location Orientation Mid   Description Aching;Cramping;Constant   Non Verbal Descriptors   Non Verbal Scale  Grimacing;Tense Body Language   Cognition    Level of Consciousness Alert   Functional Level of Assist   Upper Body Dressing Maximal Assist  (TLSO only. education on how to best position & strap TLSO)   Bed, Chair, Wheelchair Transfer Contact Guard Assist   Bed Chair Wheelchair Transfer Description   (4WW)   Sitting Upper Body Exercises   Comments provided BUE HEP. See below.   IADL Treatments   Comments Pt placed breakfast items in to grocery cart. Pt moved concentrator from 4WW to cart. Education on use of cart as support. Education on short trips only at first and working up to [employer] trips slowly. Pt walked 1 lap around back gym with grocery cart and put grocery items back on the shelf.   Interdisciplinary Plan of Care Collaboration   IDT Collaboration with  Family / Caregiver   Patient Position at End of Therapy In Bed;Call Light within Reach;Tray Table within Reach;Phone " within Reach   Collaboration Comments re: CLOF, POC,  initiating family training   OT Total Time Spent   OT Individual Total Time Spent (Mins) 60   OT Charge Group   OT Therapy Activity 2   OT Therapeutic Exercise  2           Initiated 1 set of 10 each exercise R and then L  However L more limited.  Unable to perform shoulder extension, shoulder flexion, or shoulder abduction with LUE. OT starred exercises on pt's handout to identify exercises that had potential to increase back pain if pt was having a bad day.    Assessment    Pt with more pain today than yesterday. Pt's daughter is present and supportive. She states she will help pt at home and that she is working on getting grab bars installed. She states she will be present everyday until d/c to learn how to help her at home, but that she will need to be fairly independent since daughter works full time.    Strengths: Able to follow instructions, Alert and oriented, Effective communication skills, Good insight into deficits/needs, Independent prior level of function, Making steady progress towards goals, Motivated for self care and independence, Pleasant and cooperative, Supportive family, Willingly participates in therapeutic activities  Barriers: Generalized weakness, Impaired activity tolerance, Impaired balance, Pain, Pain poorly managed    Plan    Endurance, UE strengthening, balance, functional transfers/mobility     Passport items to be completed:  Perform bathroom transfers, complete dressing, complete feeding, get ready for the day, prepare a simple meal, participate in household tasks, adapt home for safety needs, demonstrate home exercise program, complete caregiver training     Occupational Therapy Goals (Active)     Problem: Bathing     Dates: Start: 07/10/21       Goal: STG-Within one week, patient will bathe with supervision using AE and DME as needed.     Dates: Start: 07/10/21       Goal Note filed on 07/13/21 1318 by Alejandra Wan,  MS,OTR/L     Requires Min A               Problem: Functional Transfers     Dates: Start: 07/10/21       Goal: STG-Within one week, patient will transfer to toilet with supervision.     Dates: Start: 07/10/21       Goal Note filed on 07/13/21 1318 by Alejandra Wan, MS,OTR/L     Requires SBA               Problem: IADL's     Dates: Start: 07/13/21       Goal: STG-Within one week, patient will prepare a meal with SBA at 4WW level     Dates: Start: 07/13/21             Problem: OT Long Term Goals     Dates: Start: 07/10/21       Goal: LTG-By discharge, patient will complete basic self care tasks with mod I to supervision.      Dates: Start: 07/10/21          Goal: LTG-By discharge, patient will perform bathroom transfers with mod I to supervision.     Dates: Start: 07/10/21

## 2021-07-13 NOTE — THERAPY
07/13/21 1459   Precautions   Precautions Fall Risk;Spinal / Back Precautions ;TLSO (Thoracolumbosacral orthosis)   Comments TLSO EOB   Pain 0 - 10 Group   Location Back   Location Orientation Mid   Therapist Pain Assessment 6;During Activity;Prior to Activity   Cognition    Cognition / Consciousness WDL   Level of Consciousness Alert   Gait Functional Level of Assist    Gait Level Of Assist Stand by Assist   Assistive Device 4 Wheel Walker   Distance (Feet) 475  (475 ft x 1, 320 ft x 1)   # of Times Distance was Traveled 1   Deviation Antalgic;Decreased Base Of Support;Bradykinetic;Decreased Heel Strike   Transfer Functional Level of Assist   Bed, Chair, Wheelchair Transfer Contact Guard Assist   Bed Chair Wheelchair Transfer Description Adaptive equipment;Increased time;Supervision for safety;Verbal cueing  (FWW, spine precautiopns)   Sitting Lower Body Exercises   Nustep Resistance Level 3  (63 spm 23 minutes)   Bed Mobility    Supine to Sit Minimal Assist   Sit to Supine Stand by Assist   Sit to Stand Contact Guard Assist   Scooting Stand by Assist   Rolling Minimum Assist to Lt.;Supervised   Neuro-Muscular Treatments   Neuro-Muscular Treatments Sequencing;Tactile Cuing;Facilitation   Comments Sequencing supine rolling in bed   Interdisciplinary Plan of Care Collaboration   Patient Position at End of Therapy In Bed;Family / Friend in Room;Phone within Reach;Call Light within Reach   PT Total Time Spent   PT Individual Total Time Spent (Mins) 60   PT Charge Group   PT Gait Training 1   PT Therapeutic Exercise 2   PT Therapeutic Activities 1   Physical Therapy   Daily Treatment     Patient Name: Peg Louie  Age:  76 y.o., Sex:  female  Medical Record #: [id number]  Today's Date: 7/13/2021     Precautions  Precautions: Fall Risk, Spinal / Back Precautions , TLSO (Thoracolumbosacral orthosis)  Comments: TLSO EOB    Subjective    The pt was resting in bed and she agreed to PT. She c/o spasms on the left  side of her back but wanted to participate in therapy.     Objective    The pt was educated by PT regarding the rolling in bed in the hook-lying position. She was able to position into hook-lying and needed Eze to roll left and sit up to EOB. She needed assistance to put on the TLSO and CGA to stand with the 4ww. The pt participated in gait training, 4ww, SBA and tolerated 475 FT and 325 FT. She also used the Nustep with the information indicated above.     Assessment    The pt is able to transfer supine<>sit with spine precautions CGA/Eze. Her barrier is pain from the spasms in the paraspinal muscles. 23 minutes on the Nustep helped provide some pain moderation. She walked to her room with the 4ww and needed assistance to remove the TLSO before lying down. She was able to lie down without assistance and reported her back felt better than when she sat up.  Strengths: Able to follow instructions, Alert and oriented, Effective communication skills, Good carryover of learning, Supportive family, Pleasant and cooperative, Motivated for self care and independence, Making steady progress towards goals, Willingly participates in therapeutic activities  Barriers: Pain, Generalized weakness, Fatigue, Impaired balance, Impaired activity tolerance (Fall Risk)    Plan    Safety education, bed mobility and transfers with spine precautions, gait training 4ww, TLSO training with her daughter, Nustep     Passport items to be completed:  Passport items to be completed:  Get in/out of bed safely, in/out of a vehicle, safely use mobility device, walk or wheel around home/community, navigate up and down stairs, show how to get up/down from the ground, ensure home is accessible, demonstrate HEP, complete caregiver training    Physical Therapy Problems (Active)     Problem: Balance     Dates: Start: 07/10/21       Goal: STG-Within one week, patient will maintain dynamic standing perform Harris balance     Dates: Start: 07/10/21              Problem: Mobility     Dates: Start: 07/10/21       Goal: STG-Within one week, patient will ambulate up/down a curb with LRAD CGA     Dates: Start: 07/10/21          Goal: STG-Within one week, patient will     Dates: Start: 07/13/21       Goal Note filed on 07/13/21 1249 by CHRISTINA Osullivan     1) Individualized goal:   Gait 4ww 450 ft supervision, one week  2) Interventions:  PT Gait Training, PT Therapeutic Exercises, and PT Therapeutic Activity               Problem: Mobility Transfers     Dates: Start: 07/10/21       Goal: STG-Within one week, patient will transfer bed to chair Josh w/c<>bed in room     Dates: Start: 07/10/21             Problem: PT-Long Term Goals     Dates: Start: 07/10/21       Goal: LTG-By discharge, patient will ambulate LRAD x300' Josh indoors     Dates: Start: 07/10/21          Goal: LTG-By discharge, patient will transfer one surface to another Josh with LRAD     Dates: Start: 07/10/21          Goal: LTG-By discharge, patient will perform home exercise program: Josh with amb and strengthening     Dates: Start: 07/10/21          Goal: LTG-By discharge, patient will transfer in/out of a car SPV with LRAD     Dates: Start: 07/10/21

## 2021-07-14 PROCEDURE — 97535 SELF CARE MNGMENT TRAINING: CPT

## 2021-07-14 PROCEDURE — 97530 THERAPEUTIC ACTIVITIES: CPT

## 2021-07-14 PROCEDURE — 99232 SBSQ HOSP IP/OBS MODERATE 35: CPT | Mod: GC | Performed by: PHYSICAL MEDICINE & REHABILITATION

## 2021-07-14 PROCEDURE — 700102 HCHG RX REV CODE 250 W/ 637 OVERRIDE(OP): Performed by: PHYSICAL MEDICINE & REHABILITATION

## 2021-07-14 PROCEDURE — 700102 HCHG RX REV CODE 250 W/ 637 OVERRIDE(OP): Performed by: HOSPITALIST

## 2021-07-14 PROCEDURE — 94760 N-INVAS EAR/PLS OXIMETRY 1: CPT

## 2021-07-14 PROCEDURE — 97116 GAIT TRAINING THERAPY: CPT

## 2021-07-14 PROCEDURE — 99232 SBSQ HOSP IP/OBS MODERATE 35: CPT | Performed by: HOSPITALIST

## 2021-07-14 PROCEDURE — 770010 HCHG ROOM/CARE - REHAB SEMI PRIVAT*

## 2021-07-14 PROCEDURE — 97140 MANUAL THERAPY 1/> REGIONS: CPT

## 2021-07-14 PROCEDURE — 97112 NEUROMUSCULAR REEDUCATION: CPT

## 2021-07-14 PROCEDURE — A9270 NON-COVERED ITEM OR SERVICE: HCPCS | Performed by: PHYSICAL MEDICINE & REHABILITATION

## 2021-07-14 PROCEDURE — 97110 THERAPEUTIC EXERCISES: CPT

## 2021-07-14 PROCEDURE — 700111 HCHG RX REV CODE 636 W/ 250 OVERRIDE (IP): Performed by: PHYSICAL MEDICINE & REHABILITATION

## 2021-07-14 PROCEDURE — A9270 NON-COVERED ITEM OR SERVICE: HCPCS | Performed by: HOSPITALIST

## 2021-07-14 RX ORDER — GABAPENTIN 300 MG/1
300 CAPSULE ORAL 3 TIMES DAILY
Status: DISCONTINUED | OUTPATIENT
Start: 2021-07-14 | End: 2021-07-16

## 2021-07-14 RX ADMIN — OXYCODONE HYDROCHLORIDE 10 MG: 10 TABLET ORAL at 15:44

## 2021-07-14 RX ADMIN — BISACODYL 10 MG: 10 SUPPOSITORY RECTAL at 16:58

## 2021-07-14 RX ADMIN — ENOXAPARIN SODIUM 40 MG: 40 INJECTION SUBCUTANEOUS at 20:10

## 2021-07-14 RX ADMIN — GABAPENTIN 300 MG: 300 CAPSULE ORAL at 20:10

## 2021-07-14 RX ADMIN — MAGNESIUM HYDROXIDE 30 ML: 400 SUSPENSION ORAL at 05:18

## 2021-07-14 RX ADMIN — POTASSIUM CHLORIDE 20 MEQ: 1500 TABLET, EXTENDED RELEASE ORAL at 08:15

## 2021-07-14 RX ADMIN — CEFDINIR 300 MG: 300 CAPSULE ORAL at 08:14

## 2021-07-14 RX ADMIN — CEFDINIR 300 MG: 300 CAPSULE ORAL at 20:09

## 2021-07-14 RX ADMIN — OXYCODONE HYDROCHLORIDE 10 MG: 10 TABLET ORAL at 04:52

## 2021-07-14 RX ADMIN — BACLOFEN 10 MG: 10 TABLET ORAL at 15:44

## 2021-07-14 RX ADMIN — ATENOLOL 50 MG: 25 TABLET ORAL at 05:18

## 2021-07-14 RX ADMIN — SENNOSIDES AND DOCUSATE SODIUM 2 TABLET: 8.6; 5 TABLET ORAL at 08:14

## 2021-07-14 RX ADMIN — GABAPENTIN 300 MG: 300 CAPSULE ORAL at 15:44

## 2021-07-14 RX ADMIN — BACLOFEN 10 MG: 10 TABLET ORAL at 08:15

## 2021-07-14 RX ADMIN — OXYCODONE HYDROCHLORIDE 10 MG: 10 TABLET ORAL at 08:13

## 2021-07-14 RX ADMIN — OXYCODONE HYDROCHLORIDE 10 MG: 10 TABLET ORAL at 11:59

## 2021-07-14 RX ADMIN — SENNOSIDES AND DOCUSATE SODIUM 2 TABLET: 8.6; 5 TABLET ORAL at 20:09

## 2021-07-14 RX ADMIN — BACLOFEN 10 MG: 10 TABLET ORAL at 20:10

## 2021-07-14 RX ADMIN — GABAPENTIN 100 MG: 100 CAPSULE ORAL at 05:18

## 2021-07-14 ASSESSMENT — ENCOUNTER SYMPTOMS
PALPITATIONS: 0
BLURRED VISION: 0
SHORTNESS OF BREATH: 0
NAUSEA: 0
VOMITING: 0
HALLUCINATIONS: 0
DIZZINESS: 0
HEADACHES: 0
FEVER: 0

## 2021-07-14 ASSESSMENT — GAIT ASSESSMENTS
ASSISTIVE DEVICE: 4 WHEEL WALKER
DISTANCE (FEET): 200
GAIT LEVEL OF ASSIST: STAND BY ASSIST

## 2021-07-14 ASSESSMENT — ACTIVITIES OF DAILY LIVING (ADL)
BED_CHAIR_WHEELCHAIR_TRANSFER_DESCRIPTION: ADAPTIVE EQUIPMENT;INCREASED TIME;SUPERVISION FOR SAFETY
TOILET_TRANSFER_DESCRIPTION: ADAPTIVE EQUIPMENT;INCREASED TIME;SUPERVISION FOR SAFETY
TOILETING_LEVEL_OF_ASSIST_DESCRIPTION: SUPERVISION FOR SAFETY

## 2021-07-14 NOTE — THERAPY
"Occupational Therapy  Daily Treatment     Patient Name: Peg Louie  Age:  76 y.o., Sex:  female  Medical Record #: [id number]  Today's Date: 7/14/2021     Precautions  Precautions: (P) Fall Risk, Spinal / Back Precautions , TLSO (Thoracolumbosacral orthosis)  Comments: (P) TLSO EOB         Subjective    \"If I didn't have back pain I would be fine.\"     Objective       07/14/21 0931   Precautions   Precautions Fall Risk;Spinal / Back Precautions ;TLSO (Thoracolumbosacral orthosis)   Comments TLSO EOB   Functional Level of Assist   Toileting Supervision   Toileting Description Supervision for safety   Toilet Transfers Supervised   Toilet Transfer Description Adaptive equipment;Increased time;Supervision for safety   Sitting Upper Body Exercises   Front Arm Raise 2 sets of 10;Right ;Left;Weight (See Comments for lbs)  (1# BUE)   Side Arm Raise 2 sets of 10;Right ;Left;Weight (See Comments for lbs)  (2# on RUE, 1# LUE)   Shoulder Extension 2 sets of 10;Right ;Left;Weight (See Comments for lbs)  (3# BUE)   External Shoulder Rotation 2 sets of 10;Right ;Left;Weight (See Comments for lbs)  (3# BUE)   Bicep Curls 2 sets of 10;Right ;Left;Weight (See Comments for lbs)  (3# BUE)   Pronation / Supination 2 sets of 10;Right ;Left;Weight (See Comments for lbs)  (3# BUE)   Upper Extremity Bike Level 1 Resistance  (fluido bike, ~1 minute, too painful )   Sitting Lower Body Exercises   Nustep Resistance Level 2  (10 minutes, 2 breaks)   Interdisciplinary Plan of Care Collaboration   IDT Collaboration with  Physical Therapist   Patient Position at End of Therapy In Bed;Tray Table within Reach;Phone within Reach;Call Light within Reach   Collaboration Comments CLOF   OT Total Time Spent   OT Individual Total Time Spent (Mins) 60   OT Charge Group   OT Self Care / ADL 1   OT Therapeutic Exercise  3     Pt ambulated in gyms and hallways with 4WW and SBA for safety.   Assessment    Pt tolerated session well focused on " endurance, UE strengthening, and toileting. Pt donned TLSO with min A at EOB and was able to adjust tightness while standing.  Pt's biggest barrier cont to be back pain. Pt completed 2 inch step up and down for fluido bike. Pt reported the fluido bike hurt her back and was unable to cont. Pt reported that massage from PT helped a lot with the pain.   Strengths: Able to follow instructions, Alert and oriented, Effective communication skills, Good insight into deficits/needs, Independent prior level of function, Making steady progress towards goals, Motivated for self care and independence, Pleasant and cooperative, Supportive family, Willingly participates in therapeutic activities  Barriers: Generalized weakness, Impaired activity tolerance, Impaired balance, Pain, Pain poorly managed    Plan    Endurance, UE strengthening, balance, functional transfers/mobility     Passport items to be completed:  Perform bathroom transfers, complete dressing, complete feeding, get ready for the day, prepare a simple meal, participate in household tasks, adapt home for safety needs, demonstrate home exercise program, complete caregiver training        Occupational Therapy Goals (Active)     Problem: Bathing     Dates: Start: 07/10/21       Goal: STG-Within one week, patient will bathe with supervision using AE and DME as needed.     Dates: Start: 07/10/21       Goal Note filed on 07/13/21 1318 by Alejandra Wan, MS,OTR/L     Requires Min A               Problem: Functional Transfers     Dates: Start: 07/10/21       Goal: STG-Within one week, patient will transfer to toilet with supervision.     Dates: Start: 07/10/21       Goal Note filed on 07/13/21 1318 by Alejandra Wan MS,OTR/L     Requires SBA               Problem: IADL's     Dates: Start: 07/13/21       Goal: STG-Within one week, patient will prepare a meal with SBA at 4WW level     Dates: Start: 07/13/21             Problem: OT Long Term Goals     Dates: Start:  07/10/21       Goal: LTG-By discharge, patient will complete basic self care tasks with mod I to supervision.      Dates: Start: 07/10/21          Goal: LTG-By discharge, patient will perform bathroom transfers with mod I to supervision.     Dates: Start: 07/10/21

## 2021-07-14 NOTE — CARE PLAN
The patient is Stable - Low risk of patient condition declining or worsening    Problem: Pain - Standard  Goal: Alleviation of pain or a reduction in pain to the patient’s comfort goal  Note: Patient able to verbalize pain level and verbalize an acceptable level of pain.      Problem: Infection  Goal: Patient will remain free from infection  Note: Patient remains free from s/s infection; afebrile.  Will continue to monitor.

## 2021-07-14 NOTE — PROGRESS NOTES
Rehab Progress Note      Encounter Date: 7/14/2021     CC: back pain, knee pain     Interval Events (Subjective)  Patient sitting up in room. Her back pain has improved. Pain is controlled. Denies nausea, vomit, diarrhea.. Denies SOB. Patient is doing well in therapy. She is looking forward to seeing her daughter today.        DC/Disposition:  7/20/21     Objective:  VITAL SIGNS: Blood Pressure : 128/70, Pulse: 71, Respiration: 18, Temperature: 37 °C (98.6 °F), Pulse Oximetry: 93 %, O2 (LPM): 2, O2 Delivery Device: Silicone Nasal Cannula    Psych: Mood and affect appropriate  CV: RRR, no edema  Resp: CTAB, no upper airway sounds  Abd: NTND  Neuro: AOx4, 5/5 BUE  No change from 7/13           Recent Results (from the past 72 hour(s))   CBC WITH DIFFERENTIAL     Collection Time: 07/12/21  5:58 AM   Result Value Ref Range     WBC 5.3 4.8 - 10.8 K/uL     RBC 3.73 (L) 4.20 - 5.40 M/uL     Hemoglobin 10.2 (L) 12.0 - 16.0 g/dL     Hematocrit 31.8 (L) 37.0 - 47.0 %     MCV 85.3 81.4 - 97.8 fL     MCH 27.3 27.0 - 33.0 pg     MCHC 32.1 (L) 33.6 - 35.0 g/dL     RDW 46.2 35.9 - 50.0 fL     Platelet Count 163 (L) 164 - 446 K/uL     MPV 10.3 9.0 - 12.9 fL     Neutrophils-Polys 55.30 44.00 - 72.00 %     Lymphocytes 35.20 22.00 - 41.00 %     Monocytes 6.60 0.00 - 13.40 %     Eosinophils 1.70 0.00 - 6.90 %     Basophils 0.60 0.00 - 1.80 %     Immature Granulocytes 0.60 0.00 - 0.90 %     Nucleated RBC 0.00 /100 WBC     Neutrophils (Absolute) 2.92 2.00 - 7.15 K/uL     Lymphs (Absolute) 1.86 1.00 - 4.80 K/uL     Monos (Absolute) 0.35 0.00 - 0.85 K/uL     Eos (Absolute) 0.09 0.00 - 0.51 K/uL     Baso (Absolute) 0.03 0.00 - 0.12 K/uL     Immature Granulocytes (abs) 0.03 0.00 - 0.11 K/uL     NRBC (Absolute) 0.00 K/uL   Comp Metabolic Panel     Collection Time: 07/12/21  5:58 AM   Result Value Ref Range     Sodium 135 135 - 145 mmol/L     Potassium 3.4 (L) 3.6 - 5.5 mmol/L     Chloride 98 96 - 112 mmol/L     Co2 28 20 - 33 mmol/L     Anion  Gap 9.0 7.0 - 16.0     Glucose 91 65 - 99 mg/dL     Bun 14 8 - 22 mg/dL     Creatinine 0.75 0.50 - 1.40 mg/dL     Calcium 9.2 8.5 - 10.5 mg/dL     AST(SGOT) 28 12 - 45 U/L     ALT(SGPT) 23 2 - 50 U/L     Alkaline Phosphatase 82 30 - 99 U/L     Total Bilirubin 0.5 0.1 - 1.5 mg/dL     Albumin 3.7 3.2 - 4.9 g/dL     Total Protein 6.6 6.0 - 8.2 g/dL     Globulin 2.9 1.9 - 3.5 g/dL     A-G Ratio 1.3 g/dL   ESTIMATED GFR     Collection Time: 07/12/21  5:58 AM   Result Value Ref Range     GFR If African American >60 >60 mL/min/1.73 m 2     GFR If Non African American >60 >60 mL/min/1.73 m 2              Current Facility-Administered Medications   Medication Frequency   • potassium chloride SA (Kdur) tablet 20 mEq DAILY   • cefdinir (OMNICEF) capsule 300 mg Q12HRS   • lidocaine (LIDODERM) 5 % 2 Patch DAILY   • atenolol (TENORMIN) tablet 50 mg Daily-0600   • Respiratory Therapy Consult Continuous RT   • oxyCODONE immediate-release (ROXICODONE) tablet 5 mg Q3HRS PRN     Or   • oxyCODONE immediate release (ROXICODONE) tablet 10 mg Q3HRS PRN   • hydrALAZINE (APRESOLINE) tablet 25 mg Q8HRS PRN   • acetaminophen (Tylenol) tablet 650 mg Q4HRS PRN   • senna-docusate (PERICOLACE or SENOKOT S) 8.6-50 MG per tablet 2 tablet BID     And   • polyethylene glycol/lytes (MIRALAX) PACKET 1 Packet QDAY PRN     And   • magnesium hydroxide (MILK OF MAGNESIA) suspension 30 mL QDAY PRN     And   • bisacodyl (DULCOLAX) suppository 10 mg QDAY PRN   • artificial tears ophthalmic solution 1 Drop PRN   • benzocaine-menthol (CEPACOL) lozenge 1 Lozenge Q2HRS PRN   • mag hydrox-al hydrox-simeth (MAALOX PLUS ES or MYLANTA DS) suspension 20 mL Q2HRS PRN   • ondansetron (ZOFRAN ODT) dispertab 4 mg 4X/DAY PRN     Or   • ondansetron (ZOFRAN) syringe/vial injection 4 mg 4X/DAY PRN   • traZODone (DESYREL) tablet 50 mg QHS PRN   • sodium chloride (OCEAN) 0.65 % nasal spray 2 Spray PRN   • heparin injection 5,000 Units Q8HRS   • diazePAM (VALIUM) tablet 5 mg  Q6HRS PRN   • gabapentin (NEURONTIN) capsule 100 mg BID   • methocarbamol (ROBAXIN) tablet 750 mg 4X/DAY   • traMADol (ULTRAM) 50 MG tablet 50 mg Q6HRS PRN               Orders Placed This Encounter   Procedures   • Diet Order Diet: Regular       Standing Status:   Standing       Number of Occurrences:   1       Order Specific Question:   Diet:       Answer:   Regular [1]         Assessment:       Active Hospital Problems     Diagnosis     • *Spinal fracture of T8 vertebra (HCC)     • Hypokalemia     • Trauma     • Hypoxia     • Thrombocytopenia (HCC)     • Anemia     • Debility     • HTN (hypertension)     • Obesity     • Hyponatremia           Medical Decision Making and Plan:  T8 wedge fracture - Patient with mechanical fall out of [place] with T8 wedge fracture managed non-operatively. TLSO when EOB or when supine if too painful.   -PT and OT for mobility and ADLs  -Follow-up with NSG     Pain Control - Patient on Gabapentin 100 mg BID and PRN Oxycodone. Also on Robaxin 750 mg QID  -Add Lidocaine patches.   -Change Robaxin to Baclofen 10 mg TID   -Consider increase in Gabapentin      HTN/Arrhythmia - Patient on Atenolol 50 mg daily      Anemia - Check AM CBC - 10.2 stable.      Thrombocytopenia - Check AM CBC - 163, improving.      Hyponatremia  - Check AM CMP - 135, improving. Recheck      Hypoxia/Atelectasis vs. Pneumonitis - less hypoxic and her oxygen was decreased to 2L. given Zosyn on 7/10-7/11 and was less hypoxic and oxygen was decrease  2L. Consult Hospitalist and  started on Omnicef on 7/12  and continue incentive spirometry      Morbid Obesity due to excess calories - Patient with BMI of 37.0 on admission, meeting medical criteria. Dietitian to consult.      DVT Ppx - Patient on Heparin on transfer and change to Lovenox

## 2021-07-14 NOTE — CARE PLAN
Problem: Risk for Autonomic Dysreflexia  Goal: Patient will show no signs and symptoms of autonomic dysreflexia  Outcome: Progressing  Note: Vital signs stable , no s/s of AD noted.     Problem: Pain - Standard  Goal: Alleviation of pain or a reduction in pain to the patient’s comfort goal  Outcome: Progressing  Note: Assessed for pain and discomfort, assisted to reposition in bed , medicated with oxycodone 10 mg at 2114 hrs with relief. Cont monitored,     Problem: Infection  Goal: Patient will remain free from infection  Outcome: Progressing  Note: Afebrile. V/s stable , no s/s of infection noted.Encouraged deep breathing and coughing exercise. O2 2 Lnc in use , denies sob .Cont monitored   The patient is Stable - Low risk of patient condition declining or worsening    Shift Goals  Patient Goals: pain control    Progress made toward(s) clinical / shift goals:  *Pain relief afforded. Able to sleep    Patient is not progressing towards the following goals:No bm yesterday , will medicate with MOM today .

## 2021-07-14 NOTE — THERAPY
"Occupational Therapy  Daily Treatment     Patient Name: Peg Louie  Age:  76 y.o., Sex:  female  Medical Record #: [id number]  Today's Date: 7/14/2021     Precautions  Precautions: Fall Risk, Spinal / Back Precautions , TLSO (Thoracolumbosacral orthosis)  Comments: TLSO EOB, 2L O2         Subjective    \"stepping down is the hardest\" - pt reports about setting off 4in step     Objective       07/14/21 1401   Precautions   Precautions Fall Risk;Spinal / Back Precautions ;TLSO (Thoracolumbosacral orthosis)   Comments TLSO EOB, 2L O2   Balance   Comments activity in // bars to address functional mobility and balance. Pt steped up/ down 4 in step, over half bolster, and balanced on blue therapy mat. 1st time through pt held onto // bars with BUE for support. Pt completed 6 rounds with rests inbetween. By the last round pt was able to compelte activity without touching // bars or losing balance.    Interdisciplinary Plan of Care Collaboration   IDT Collaboration with  Physical Therapist;Family / Caregiver   Patient Position at End of Therapy Seated;Other (Comments)  (in gym for PT)   Collaboration Comments transition of care, Daughter in pt's room   OT Total Time Spent   OT Individual Total Time Spent (Mins) 30   OT Charge Group   OT Neuromuscular Re-education / Balance 1   OT Therapy Activity 1   Pt ambulated in therapy gym and hallways with 4WW and supervision.   Pt donned TLSO EOB with min A, pt able to readjust TLSO while standing at 4WW.       Assessment    Pt tolerated session well focused on functional mobility and balance. Pt demonstrated improved balance throughout session. Pt cont to show increased independence with donning/doffing TLSO. Pt cont to require frequent breaks due to shortness of breath.   Strengths: Able to follow instructions, Alert and oriented, Effective communication skills, Good insight into deficits/needs, Independent prior level of function, Making steady progress towards goals, " Motivated for self care and independence, Pleasant and cooperative, Supportive family, Willingly participates in therapeutic activities  Barriers: Generalized weakness, Impaired activity tolerance, Impaired balance, Pain, Pain poorly managed    Plan    Endurance, UE strengthening, balance, functional transfers/mobility     Passport items to be completed:  Perform bathroom transfers, complete dressing, complete feeding, get ready for the day, prepare a simple meal, participate in household tasks, adapt home for safety needs, demonstrate home exercise program, complete caregiver training        Occupational Therapy Goals (Active)     Problem: Bathing     Dates: Start: 07/10/21       Goal: STG-Within one week, patient will bathe with supervision using AE and DME as needed.     Dates: Start: 07/10/21       Goal Note filed on 07/13/21 1318 by Alejandra Wan, MS,OTR/L     Requires Min A               Problem: Functional Transfers     Dates: Start: 07/10/21       Goal: STG-Within one week, patient will transfer to toilet with supervision.     Dates: Start: 07/10/21       Goal Note filed on 07/13/21 1318 by Alejandra Wan MS,OTR/L     Requires SBA               Problem: IADL's     Dates: Start: 07/13/21       Goal: STG-Within one week, patient will prepare a meal with SBA at 4WW level     Dates: Start: 07/13/21             Problem: OT Long Term Goals     Dates: Start: 07/10/21       Goal: LTG-By discharge, patient will complete basic self care tasks with mod I to supervision.      Dates: Start: 07/10/21          Goal: LTG-By discharge, patient will perform bathroom transfers with mod I to supervision.     Dates: Start: 07/10/21

## 2021-07-14 NOTE — DISCHARGE PLANNING
Met with patient to update after IDT conference and provide DC date of 7/20/21.  Patient will be flying to [facility] [facility] and staying there for a couple of weeks and will then return home to [place].  Her grandson will assist her at home once she gets there.  Discussed usual process of continued therapy/HH and follow-ups with providers which is challenging given that she lives in [place] and will be in [place] for a bit of time.  I can discuss further with her daughter at a later time as Hayden was not here.

## 2021-07-14 NOTE — PROGRESS NOTES
[facility] Medicine Daily Progress Note    Date of Service  7/14/2021    Chief Complaint:  Hypertension  Hypoxia  Pneumonia    Interval History:  No significant events or changes since last visit    Review of Systems  Review of Systems   Constitutional: Negative for fever.   Eyes: Negative for blurred vision.   Respiratory: Negative for shortness of breath.    Cardiovascular: Negative for palpitations.   Gastrointestinal: Negative for nausea and vomiting.   Neurological: Negative for dizziness and headaches.   Psychiatric/Behavioral: Negative for hallucinations.        Physical Exam  Temp:  [36.4 °C (97.6 °F)-37 °C (98.6 °F)] 37 °C (98.6 °F)  Pulse:  [64-77] 70  Resp:  [18] 18  BP: (128-146)/(70-78) 128/70  SpO2:  [93 %-98 %] 96 %    Physical Exam  Vitals and nursing note reviewed.   Constitutional:       General: She is not in acute distress.  HENT:      Mouth/Throat:      Mouth: Mucous membranes are moist.      Pharynx: Oropharynx is clear.   Eyes:      General: No scleral icterus.  Neck:      Vascular: No JVD.   Cardiovascular:      Rate and Rhythm: Normal rate and regular rhythm.      Heart sounds: Normal heart sounds. No murmur heard.     Pulmonary:      Effort: Pulmonary effort is normal.      Breath sounds: Normal breath sounds. No stridor.   Abdominal:      General: There is no distension.      Palpations: Abdomen is soft.      Tenderness: There is no abdominal tenderness.   Musculoskeletal:      Cervical back: No rigidity.      Right lower leg: No edema.      Left lower leg: No edema.   Skin:     General: Skin is warm and dry.      Findings: No rash.   Neurological:      Mental Status: She is alert and oriented to person, place, and time.   Psychiatric:         Mood and Affect: Mood normal.         Behavior: Behavior normal.         Fluids    Intake/Output Summary (Last 24 hours) at 7/14/2021 0904  Last data filed at 7/13/2021 1400  Gross per 24 hour   Intake 240 ml   Output --   Net 240 ml  "      Laboratory  Recent Labs     07/12/21  0558   WBC 5.3   RBC 3.73*   HEMOGLOBIN 10.2*   HEMATOCRIT 31.8*   MCV 85.3   MCH 27.3   MCHC 32.1*   RDW 46.2   PLATELETCT 163*   MPV 10.3     Recent Labs     07/12/21  0558   SODIUM 135   POTASSIUM 3.4*   CHLORIDE 98   CO2 28   GLUCOSE 91   BUN 14   CREATININE 0.75   CALCIUM 9.2                   Imaging    Assessment/Plan  Hypoxia  Assessment & Plan  Pt reports having had \"shallow breathing from pain\"  Denies chest pain, palpitations, or calf pain  On O2 supplementation  Covid: negative 7/9/21  CXR (7/10): Ill-defined right perihilar opacity could represent atelectasis versus pneumonitis  CXR (7/12): unchanged  S/P Zosyn  On Omnicef (thru 7/14)  Encouraging IS    Trauma  Assessment & Plan  S/P mechanical GLF -- fell out of camper onto back  Has T7 spinous process fx and T8 wedge fx  Non-surgical management per Neurosurgery  TLSO when OOB    Hypokalemia  Assessment & Plan  K+: 3.4  S/P supplements x 3 days  Monitor    Anemia- (present on admission)  Assessment & Plan  H&H stable  Will check Fe, B12 at the next blood draw    HTN (hypertension)- (present on admission)  Assessment & Plan  BP ok  On Atenolol  Monitor    "

## 2021-07-14 NOTE — DISCHARGE PLANNING
Met with patient and her daughter to discuss discharge planning.  They are surprised at the DC date of 7/20/21.  Discussed follow-up with PCP in [place], equipment and ability to travel.  Questions answered.  Hayden is hoping to meet with Dr. Penn on Friday morning when he sees Peg to be able to ask questions.

## 2021-07-14 NOTE — FLOWSHEET NOTE
07/14/21 1123   Events/Summary/Plan   Events/Summary/Plan 02 spot check, no further wean yet   Vital Signs   Pulse 71   Respiration 18   Pulse Oximetry 93 %   $ Pulse Oximetry (Spot Check) Yes   Respiratory Assessment   Level of Consciousness Alert   Chest Exam   Work Of Breathing / Effort Within Normal Limits   Oxygen   O2 (LPM) 2   O2 Delivery Device Silicone Nasal Cannula

## 2021-07-14 NOTE — CARE PLAN
Problem: Pain - Standard  Goal: Alleviation of pain or a reduction in pain to the patient’s comfort goal  Note: Patient able to verbalize pain level and verbalize an acceptable level of pain.      Problem: Infection  Goal: Patient will remain free from infection  Note: Patient remains free from s/s infection; afebrile.  Will continue to monitor.          The patient is Stable - Low risk of patient condition declining or worsening

## 2021-07-14 NOTE — THERAPY
Physical Therapy   Daily Treatment     Patient Name: Peg Louie  Age:  76 y.o., Sex:  female  Medical Record #: [id number]  Today's Date: 7/14/2021     Precautions  Precautions: Fall Risk, Spinal / Back Precautions , TLSO (Thoracolumbosacral orthosis)  Comments: TLSO EOB    Subjective    Pt was supine in bed upon arrival and agreeable to treatment.  Pt reported increased back pain and muscle spasm.     Objective       07/14/21 0831   Precautions   Precautions Fall Risk;Spinal / Back Precautions ;TLSO (Thoracolumbosacral orthosis)   Pain 0 - 10 Group   Therapist Pain Assessment Prior to Activity;8;Post Activity;6   Supine Lower Body Exercise   Abdominal Bracing 1 set of 10;Bilateral   Hip Abduction 1 set of 10;Hook Lying;Bilateral   Hip Adduction  1 set of 10;Bilateral   Ankle Pumps Reciprocal;1 set of 10   Gluteal Isometrics 1 set of 10;Bilateral   Quadriceps Isometrics 1 set of 10;Bilateral   Bed Mobility    Scooting Stand by Assist   Rolling Supervised   Interdisciplinary Plan of Care Collaboration   IDT Collaboration with  Occupational Therapist   Patient Position at End of Therapy In Bed;Call Light within Reach;Tray Table within Reach;Phone within Reach   Collaboration Comments POC, pain management   PT Total Time Spent   PT Individual Total Time Spent (Mins) 60   PT Charge Group   PT Therapeutic Exercise 2   PT Therapeutic Activities 1   PT Manual Therapy 1     Completed gentle STM to B thoracic and lumbar paraspinals, B lats, and shoulder stabilizers x 20 min with pt in sidelying position.  Pt given hot pack to thoracic paraspinals x 10 min post manual work.  Reviewed with pt supine HEP.  Pt education on spinal precautions with mobility and non-pharmaceutical pain management interventions.  Completed core stabilization training with TrA activation x 20 reps and education on spinal anatomy/physiology.     Assessment    Pt with good response to STM with pain decreasing from 8/10 to 6/10.  Pt verbalized  all education   Strengths: Able to follow instructions, Alert and oriented, Effective communication skills, Good carryover of learning, Supportive family, Pleasant and cooperative, Motivated for self care and independence, Making steady progress towards goals, Willingly participates in therapeutic activities  Barriers: Pain, Generalized weakness, Fatigue, Impaired balance, Impaired activity tolerance (Fall Risk)    Plan    Safety education, bed mobility and transfers with spine precautions, gait training 4ww, TLSO training with her daughter, Nustep      Passport items to be completed:  Passport items to be completed:  Get in/out of bed safely, in/out of a vehicle, safely use mobility device, walk or wheel around home/community, navigate up and down stairs, show how to get up/down from the ground, ensure home is accessible, demonstrate HEP, complete caregiver training    Physical Therapy Problems (Active)     Problem: Balance     Dates: Start: 07/10/21       Goal: STG-Within one week, patient will maintain dynamic standing perform Harris balance     Dates: Start: 07/10/21             Problem: Mobility     Dates: Start: 07/10/21       Goal: STG-Within one week, patient will ambulate up/down a curb with LRAD CGA     Dates: Start: 07/10/21          Goal: STG-Within one week, patient will     Dates: Start: 07/13/21       Goal Note filed on 07/13/21 1249 by CHRISTINA Osullivan     1) Individualized goal:   Gait 4ww 450 ft supervision, one week  2) Interventions:  PT Gait Training, PT Therapeutic Exercises, and PT Therapeutic Activity               Problem: Mobility Transfers     Dates: Start: 07/10/21       Goal: STG-Within one week, patient will transfer bed to chair Josh w/c<>bed in room     Dates: Start: 07/10/21             Problem: PT-Long Term Goals     Dates: Start: 07/10/21       Goal: LTG-By discharge, patient will ambulate LRAD x300' Josh indoors     Dates: Start: 07/10/21          Goal: LTG-By discharge,  patient will transfer one surface to another Josh with LRAD     Dates: Start: 07/10/21          Goal: LTG-By discharge, patient will perform home exercise program: Josh with amb and strengthening     Dates: Start: 07/10/21          Goal: LTG-By discharge, patient will transfer in/out of a car SPV with LRAD     Dates: Start: 07/10/21

## 2021-07-14 NOTE — THERAPY
Physical Therapy   Daily Treatment     Patient Name: Peg Louie  Age:  76 y.o., Sex:  female  Medical Record #: [id number]  Today's Date: 7/14/2021     Precautions  Precautions: (P) Fall Risk, Spinal / Back Precautions , TLSO (Thoracolumbosacral orthosis)  Comments: (P) TLSO EOB, 2L O2    Subjective    Patient seated in gym and agreeable to therapy.     Objective       07/14/21 1431   Precautions   Precautions Fall Risk;Spinal / Back Precautions ;TLSO (Thoracolumbosacral orthosis)   Comments TLSO EOB, 2L O2   Gait Functional Level of Assist    Gait Level Of Assist Stand by Assist   Assistive Device 4 Wheel Walker   Distance (Feet) 200   # of Times Distance was Traveled 2   Deviation Antalgic;Trendelenberg;Decreased Heel Strike   Transfer Functional Level of Assist   Bed, Chair, Wheelchair Transfer Stand by Assist   Bed Chair Wheelchair Transfer Description Adaptive equipment;Increased time;Supervision for safety  (stand step transfer with 4WW)   Bed Mobility    Sit to Stand Supervised   Interdisciplinary Plan of Care Collaboration   IDT Collaboration with  Occupational Therapist   Patient Position at End of Therapy Family / Friend in Room;Call Light within Reach;Tray Table within Reach;Phone within Reach;In Bed   Collaboration Comments received patient from OT   PT Total Time Spent   PT Individual Total Time Spent (Mins) 30   PT Charge Group   PT Gait Training 1   PT Therapeutic Exercise 1     Nusteps with BLEs only per patient preference for 15 minutes, level 3, 867 steps. Moist heat pack applied to back while performing.    Assessment    Patient tolerated session well, requesting to work on stamina and ambulation. Declined working on bed mobility despite reports that it is one of the more challenging tasks for her.    Strengths: Able to follow instructions, Alert and oriented, Effective communication skills, Good carryover of learning, Supportive family, Pleasant and cooperative, Motivated for self care  and independence, Making steady progress towards goals, Willingly participates in therapeutic activities  Barriers: Pain, Generalized weakness, Fatigue, Impaired balance, Impaired activity tolerance (Fall Risk)    Plan    Safety education, bed mobility and transfers with spine precautions, gait training 4ww, TLSO training with her daughter, Bret      Passport items to be completed:  Get in/out of bed safely, in/out of a vehicle, safely use mobility device, walk or wheel around home/community, navigate up and down stairs, show how to get up/down from the ground, ensure home is accessible, demonstrate HEP, complete caregiver training      Physical Therapy Problems (Active)     Problem: Balance     Dates: Start: 07/10/21       Goal: STG-Within one week, patient will maintain dynamic standing perform Harris balance     Dates: Start: 07/10/21             Problem: Mobility     Dates: Start: 07/10/21       Goal: STG-Within one week, patient will ambulate up/down a curb with LRAD CGA     Dates: Start: 07/10/21          Goal: STG-Within one week, patient will     Dates: Start: 07/13/21       Goal Note filed on 07/13/21 1249 by CHRISTINA Osullivan     1) Individualized goal:   Gait 4ww 450 ft supervision, one week  2) Interventions:  PT Gait Training, PT Therapeutic Exercises, and PT Therapeutic Activity               Problem: Mobility Transfers     Dates: Start: 07/10/21       Goal: STG-Within one week, patient will transfer bed to chair Josh w/c<>bed in room     Dates: Start: 07/10/21             Problem: PT-Long Term Goals     Dates: Start: 07/10/21       Goal: LTG-By discharge, patient will ambulate LRAD x300' Josh indoors     Dates: Start: 07/10/21          Goal: LTG-By discharge, patient will transfer one surface to another Josh with LRAD     Dates: Start: 07/10/21          Goal: LTG-By discharge, patient will perform home exercise program: Josh with amb and strengthening     Dates: Start: 07/10/21          Goal:  LTG-By discharge, patient will transfer in/out of a car SPV with LRAD     Dates: Start: 07/10/21

## 2021-07-15 PROCEDURE — 94760 N-INVAS EAR/PLS OXIMETRY 1: CPT

## 2021-07-15 PROCEDURE — 700102 HCHG RX REV CODE 250 W/ 637 OVERRIDE(OP): Performed by: PHYSICAL MEDICINE & REHABILITATION

## 2021-07-15 PROCEDURE — 99232 SBSQ HOSP IP/OBS MODERATE 35: CPT | Performed by: HOSPITALIST

## 2021-07-15 PROCEDURE — 97530 THERAPEUTIC ACTIVITIES: CPT

## 2021-07-15 PROCEDURE — 770010 HCHG ROOM/CARE - REHAB SEMI PRIVAT*

## 2021-07-15 PROCEDURE — A9270 NON-COVERED ITEM OR SERVICE: HCPCS | Performed by: PHYSICAL MEDICINE & REHABILITATION

## 2021-07-15 PROCEDURE — 99232 SBSQ HOSP IP/OBS MODERATE 35: CPT | Performed by: PHYSICAL MEDICINE & REHABILITATION

## 2021-07-15 PROCEDURE — 97535 SELF CARE MNGMENT TRAINING: CPT

## 2021-07-15 PROCEDURE — 97110 THERAPEUTIC EXERCISES: CPT

## 2021-07-15 PROCEDURE — 97112 NEUROMUSCULAR REEDUCATION: CPT

## 2021-07-15 PROCEDURE — 97116 GAIT TRAINING THERAPY: CPT

## 2021-07-15 RX ADMIN — SENNOSIDES AND DOCUSATE SODIUM 2 TABLET: 8.6; 5 TABLET ORAL at 21:28

## 2021-07-15 RX ADMIN — GABAPENTIN 300 MG: 300 CAPSULE ORAL at 14:30

## 2021-07-15 RX ADMIN — OXYCODONE HYDROCHLORIDE 10 MG: 10 TABLET ORAL at 14:30

## 2021-07-15 RX ADMIN — OXYCODONE HYDROCHLORIDE 10 MG: 10 TABLET ORAL at 05:23

## 2021-07-15 RX ADMIN — BACLOFEN 10 MG: 10 TABLET ORAL at 21:28

## 2021-07-15 RX ADMIN — BACLOFEN 10 MG: 10 TABLET ORAL at 08:25

## 2021-07-15 RX ADMIN — BACLOFEN 10 MG: 10 TABLET ORAL at 14:30

## 2021-07-15 RX ADMIN — OXYCODONE HYDROCHLORIDE 10 MG: 10 TABLET ORAL at 02:17

## 2021-07-15 RX ADMIN — SENNOSIDES AND DOCUSATE SODIUM 2 TABLET: 8.6; 5 TABLET ORAL at 08:25

## 2021-07-15 RX ADMIN — ATENOLOL 50 MG: 25 TABLET ORAL at 05:20

## 2021-07-15 RX ADMIN — OXYCODONE HYDROCHLORIDE 10 MG: 10 TABLET ORAL at 11:31

## 2021-07-15 RX ADMIN — GABAPENTIN 300 MG: 300 CAPSULE ORAL at 08:25

## 2021-07-15 RX ADMIN — GABAPENTIN 300 MG: 300 CAPSULE ORAL at 21:28

## 2021-07-15 RX ADMIN — OXYCODONE HYDROCHLORIDE 10 MG: 10 TABLET ORAL at 08:25

## 2021-07-15 ASSESSMENT — ENCOUNTER SYMPTOMS
BLURRED VISION: 0
DIARRHEA: 0
COUGH: 0
NERVOUS/ANXIOUS: 0
FEVER: 0
DIZZINESS: 0

## 2021-07-15 ASSESSMENT — GAIT ASSESSMENTS
DISTANCE (FEET): 245
DEVIATION: ANTALGIC;BRADYKINETIC;DECREASED HEEL STRIKE
ASSISTIVE DEVICE: 4 WHEEL WALKER
DISTANCE (FEET): 340
GAIT LEVEL OF ASSIST: STAND BY ASSIST
DEVIATION: ANTALGIC;BRADYKINETIC;DECREASED HEEL STRIKE
ASSISTIVE DEVICE: 4 WHEEL WALKER
GAIT LEVEL OF ASSIST: STAND BY ASSIST

## 2021-07-15 ASSESSMENT — ACTIVITIES OF DAILY LIVING (ADL)
TUB_SHOWER_TRANSFER_DESCRIPTION: GRAB BAR;SHOWER BENCH;INCREASED TIME;SUPERVISION FOR SAFETY;VERBAL CUEING
BED_CHAIR_WHEELCHAIR_TRANSFER_DESCRIPTION: INCREASED TIME;SET-UP OF EQUIPMENT;SUPERVISION FOR SAFETY
BED_CHAIR_WHEELCHAIR_TRANSFER_DESCRIPTION: ADAPTIVE EQUIPMENT;INCREASED TIME;SUPERVISION FOR SAFETY

## 2021-07-15 NOTE — PROGRESS NOTES
[facility] Medicine Daily Progress Note    Date of Service  7/15/2021    Chief Complaint:  Hypertension  Hypoxia  Pneumonia    Interval History:  No significant events or changes since last visit    Review of Systems  Review of Systems   Constitutional: Negative for fever.   Eyes: Negative for blurred vision.   Respiratory: Negative for cough.    Cardiovascular: Negative for chest pain.   Gastrointestinal: Negative for diarrhea.   Musculoskeletal: Negative for joint pain.   Neurological: Negative for dizziness.   Psychiatric/Behavioral: The patient is not nervous/anxious.         Physical Exam  Temp:  [36.3 °C (97.4 °F)-36.9 °C (98.5 °F)] 36.3 °C (97.4 °F)  Pulse:  [68-79] 68  Resp:  [18] 18  BP: (101-123)/(55-68) 101/55  SpO2:  [92 %-95 %] 95 %    Physical Exam  Vitals and nursing note reviewed.   Constitutional:       Appearance: She is not diaphoretic.   HENT:      Mouth/Throat:      Mouth: Mucous membranes are moist.      Pharynx: No oropharyngeal exudate or posterior oropharyngeal erythema.   Eyes:      Extraocular Movements: Extraocular movements intact.   Neck:      Vascular: No carotid bruit or JVD.   Cardiovascular:      Rate and Rhythm: Normal rate and regular rhythm.      Heart sounds: Normal heart sounds. No murmur heard.     Pulmonary:      Effort: Pulmonary effort is normal.      Breath sounds: Normal breath sounds. No stridor.   Abdominal:      General: Bowel sounds are normal.      Palpations: Abdomen is soft.   Musculoskeletal:      Right lower leg: No edema.      Left lower leg: No edema.   Skin:     General: Skin is warm and dry.      Findings: No rash.   Neurological:      Mental Status: She is alert and oriented to person, place, and time.   Psychiatric:         Mood and Affect: Mood normal.         Behavior: Behavior normal.         Fluids    Intake/Output Summary (Last 24 hours) at 7/15/2021 0904  Last data filed at 7/14/2021 2012  Gross per 24 hour   Intake 800 ml   Output --   Net 800 ml  "      Laboratory                        Imaging    Assessment/Plan  Hypoxia  Assessment & Plan  Denies chest pain, palpitations, or calf pain  On O2 supplementation  Covid: negative 7/9/21  CXR (7/10): Ill-defined right perihilar opacity could represent atelectasis versus pneumonitis  CXR (7/12): unchanged  S/P Zosyn  S/P Omnicef (7/14)  2nd to pneumonia and/or having had \"shallow breathing from pain\"  Encouraging IS    Trauma  Assessment & Plan  S/P mechanical GLF -- fell out of camper onto back  Has T7 spinous process fx and T8 wedge fx  Non-surgical management per Neurosurgery  TLSO when OOB    Hypokalemia  Assessment & Plan  K+: 3.4  S/P supplements x 3 days  Monitor    Anemia- (present on admission)  Assessment & Plan  H&H stable  Will check Fe, B12 at the next blood draw    HTN (hypertension)- (present on admission)  Assessment & Plan  BP ok  On Atenolol  Monitor    "

## 2021-07-15 NOTE — THERAPY
Occupational Therapy  Daily Treatment     Patient Name: Peg Louie  Age:  76 y.o., Sex:  female  Medical Record #: [id number]  Today's Date: 7/15/2021     Precautions  Precautions: (P) Fall Risk, Spinal / Back Precautions , TLSO (Thoracolumbosacral orthosis)  Comments: (P) TLSO EOB, 2L O2         Subjective    Pt agreeable to OT session      Objective       07/15/21 1231   Precautions   Precautions Fall Risk;Spinal / Back Precautions ;TLSO (Thoracolumbosacral orthosis)   Comments TLSO EOB, 2L O2   Functional Level of Assist   Grooming Modified Independent   Grooming Description Adaptive equipment;Standing at sink   Bathing Supervision   Bathing Description Increased time;Adaptive equipment;Tub bench;Grab bar;Hand held shower;Long handled bath tool   Upper Body Dressing Supervision   Upper Body Dressing Description Increased time;Supervision for safety;Set-up of equipment   Lower Body Dressing Minimal Assist   Lower Body Dressing Description Assist with threading into pant leg;Increased time;Initial preparation for task;Set-up of equipment;Supervision for safety   Bed, Chair, Wheelchair Transfer Supervised  (4WW)   Bed Chair Wheelchair Transfer Description Increased time;Set-up of equipment;Supervision for safety   Tub / Shower Transfers Supervised   Tub Shower Transfer Description Grab bar;Shower bench;Increased time;Supervision for safety;Verbal cueing   Interdisciplinary Plan of Care Collaboration   Patient Position at End of Therapy In Bed;Call Light within Reach;Tray Table within Reach;Phone within Reach   OT Total Time Spent   OT Individual Total Time Spent (Mins) 60   OT Charge Group   OT Self Care / ADL 3   OT Therapy Activity 1   Pt was educated on home safety and fall prevention. Pt was given home safety checklist handout to take home with her. Pt reports she has tried to make her house as safe as possible to ensure she does not fall. Pt is worried about walking on uneven surfaces when she goes  back to [place] and getting in and out of a car. Pt reports she will stay with her daughter for 2-3 weeks before going home and in [place] she has family and friends that can help her.     Assessment    Pt tolerated session well focused on ADLs. Pt required increased time to complete bathing due to pain in back. Pt stood while holding onto GB for ~50% of shower. Pt required close supervision for safety while standing in shower. Pt has been thinking about what will be difficult for her when she goes home and would like to problem solve solutions here.   Strengths: Able to follow instructions, Alert and oriented, Effective communication skills, Good insight into deficits/needs, Independent prior level of function, Making steady progress towards goals, Motivated for self care and independence, Pleasant and cooperative, Supportive family, Willingly participates in therapeutic activities  Barriers: Generalized weakness, Impaired activity tolerance, Impaired balance, Pain, Pain poorly managed    Plan    Endurance, UE strengthening, balance, functional transfers/mobility     Passport items to be completed:  Perform bathroom transfers, complete dressing, complete feeding, get ready for the day, prepare a simple meal, participate in household tasks, adapt home for safety needs, demonstrate home exercise program, complete caregiver training        Occupational Therapy Goals (Active)     Problem: Bathing     Dates: Start: 07/10/21       Goal: STG-Within one week, patient will bathe with supervision using AE and DME as needed.     Dates: Start: 07/10/21       Goal Note filed on 07/13/21 1318 by Alejandra Wan MS,OTR/L     Requires Min A               Problem: Functional Transfers     Dates: Start: 07/10/21       Goal: STG-Within one week, patient will transfer to toilet with supervision.     Dates: Start: 07/10/21       Goal Note filed on 07/13/21 1318 by Alejandra Wan MS,OTR/L     Requires SBA               Problem:  IADL's     Dates: Start: 07/13/21       Goal: STG-Within one week, patient will prepare a meal with SBA at 4WW level     Dates: Start: 07/13/21             Problem: OT Long Term Goals     Dates: Start: 07/10/21       Goal: LTG-By discharge, patient will complete basic self care tasks with mod I to supervision.      Dates: Start: 07/10/21          Goal: LTG-By discharge, patient will perform bathroom transfers with mod I to supervision.     Dates: Start: 07/10/21

## 2021-07-15 NOTE — CARE PLAN
The patient is Stable - Low risk of patient condition declining or worsening        Problem: Pain - Standard  Goal: Alleviation of pain or a reduction in pain to the patient’s comfort goal  Note: Pt able to participate in therapies and activities this shift.      Problem: Pain - Standard  Goal: Alleviation of pain or a reduction in pain to the patient’s comfort goal  Note: Pt able to participate in therapies and activities this shift.

## 2021-07-15 NOTE — THERAPY
Occupational Therapy  Daily Treatment     Patient Name: Peg Louie  Age:  76 y.o., Sex:  female  Medical Record #: [id number]  Today's Date: 7/15/2021     Precautions  Precautions: Fall Risk, Spinal / Back Precautions , TLSO (Thoracolumbosacral orthosis)  Comments: TLSO EOB, 2L O2         Subjective    Pt was seen at 9:30 for 30 min and 11:00 for 30 minutes     Objective       07/15/21 0931   Precautions   Precautions Fall Risk;Spinal / Back Precautions ;TLSO (Thoracolumbosacral orthosis)   Comments TLSO EOB, 2L O2   Vitals   Pulse Oximetry 89 %   O2 Delivery Device Nasal Cannula   Vitals Comments seated post amb, pt requested O2 check   Functional Level of Assist   Upper Body Dressing Minimal Assist  (TLSO only, EOB)   Upper Body Dressing Description Application of orthotic or brace   Sitting Upper Body Exercises   Chest Press 3 sets of 10;Bilateral;Weight (See Comments for lbs)  (10#, equalizer)   Lat Pull 3 sets of 10;Bilateral;Weight (See Comments for lbs)  (10# equalizer)   Bilateral Row 3 sets of 10;Bilateral;Weight (See Comments for lbs)  (10#, equalizer)   Balance   Comments activity in // bars to address higher level balance. Pt stood on balance disk while throwing ladder balls with one hand and holding on to // bar with other. Pt required CGA for balance and had 0 LOb during activity.    Interdisciplinary Plan of Care Collaboration   IDT Collaboration with  Certified Nursing Assistant   Patient Position at End of Therapy Seated;Call Light within Reach;Tray Table within Reach;Phone within Reach   Collaboration Comments CLOF   OT Total Time Spent   OT Individual Total Time Spent (Mins) 60   OT Charge Group   OT Neuromuscular Re-education / Balance 2   OT Therapeutic Exercise  2     Pt ambulated around therapy gym and hallways with 4WW and supervision. Pt requires cues to slow down and take breaks when short of breath.  Assessment     Pt tolerated sessions well focused on UE strengthening and  balance. Pt requires frequent breaks due to decreased endurance and back pain. Pt reports she use to be able to walk 1/2 - 1 mile before requiring rest.   Strengths: Able to follow instructions, Alert and oriented, Effective communication skills, Good insight into deficits/needs, Independent prior level of function, Making steady progress towards goals, Motivated for self care and independence, Pleasant and cooperative, Supportive family, Willingly participates in therapeutic activities  Barriers: Generalized weakness, Impaired activity tolerance, Impaired balance, Pain, Pain poorly managed    Plan    Endurance, UE strengthening, balance, functional transfers/mobility     Passport items to be completed:  Perform bathroom transfers, complete dressing, complete feeding, get ready for the day, prepare a simple meal, participate in household tasks, adapt home for safety needs, demonstrate home exercise program, complete caregiver training        Occupational Therapy Goals (Active)     Problem: Bathing     Dates: Start: 07/10/21       Goal: STG-Within one week, patient will bathe with supervision using AE and DME as needed.     Dates: Start: 07/10/21       Goal Note filed on 07/13/21 1318 by Alejandra Wan MS,OTR/L     Requires Min A               Problem: Functional Transfers     Dates: Start: 07/10/21       Goal: STG-Within one week, patient will transfer to toilet with supervision.     Dates: Start: 07/10/21       Goal Note filed on 07/13/21 1318 by Alejandra Wan MS,OTR/L     Requires SBA               Problem: IADL's     Dates: Start: 07/13/21       Goal: STG-Within one week, patient will prepare a meal with SBA at 4WW level     Dates: Start: 07/13/21             Problem: OT Long Term Goals     Dates: Start: 07/10/21       Goal: LTG-By discharge, patient will complete basic self care tasks with mod I to supervision.      Dates: Start: 07/10/21          Goal: LTG-By discharge, patient will perform bathroom  transfers with mod I to supervision.     Dates: Start: 07/10/21

## 2021-07-15 NOTE — THERAPY
07/15/21 1559   Precautions   Precautions Fall Risk;Spinal / Back Precautions ;TLSO (Thoracolumbosacral orthosis)   Comments TLSO EOB, 2 L O2   Pain 0 - 10 Group   Location Back   Location Orientation Mid   Therapist Pain Assessment Prior to Activity;During Activity   Gait Functional Level of Assist    Gait Level Of Assist Stand by Assist   Assistive Device 4 Wheel Walker   Distance (Feet) 245   # of Times Distance was Traveled 2   Deviation Antalgic;Bradykinetic;Decreased Heel Strike   Transfer Functional Level of Assist   Bed, Chair, Wheelchair Transfer Supervised   Bed Chair Wheelchair Transfer Description Adaptive equipment;Increased time;Supervision for safety   Sitting Lower Body Exercises   Nustep Resistance Level 3  (81 steps per minute, 12 minutes)   Bed Mobility    Supine to Sit Stand by Assist   Sit to Supine Stand by Assist   Sit to Stand Supervised   Scooting Stand by Assist   Rolling Supervised   Neuro-Muscular Treatments   Neuro-Muscular Treatments Sequencing;Verbal Cuing   Comments Sequencing bed mobility rolling left for supine to/from sit   PT Total Time Spent   PT Individual Total Time Spent (Mins) 30   PT Charge Group   PT Gait Training 1   PT Therapeutic Exercise 1   Physical Therapy   Daily Treatment     Patient Name: Peg Louie  Age:  76 y.o., Sex:  female  Medical Record #: [id number]  Today's Date: 7/15/2021     Precautions  Precautions: (P) Fall Risk, Spinal / Back Precautions , TLSO (Thoracolumbosacral orthosis)  Comments: (P) TLSO EOB, 2 L O2    Subjective    The patient was resting in bed.  She agreed to PT.     Objective    The patient participated in bed mobility rolling to the left for spine precautions and sitting up edge of bed.  She needed assistance to put on the TLSO and to adjust it properly.  She participated in gait training with a four-wheel walker and tolerated 245 FT x2.  She utilized the NuStep with the information indicated above.    Assessment    The patient  participated in bed mobility rolling to the left for spine precautions and sitting up edge of bed.  She needed assistance to put on the TLSO and to adjust it properly.  She participated in gait training with a four-wheel walker and tolerated 245 FT x2.  She utilized the NuStep with the information indicated above.  Strengths: Able to follow instructions, Alert and oriented, Effective communication skills, Good carryover of learning, Supportive family, Pleasant and cooperative, Motivated for self care and independence, Making steady progress towards goals, Willingly participates in therapeutic activities  Barriers: Pain, Generalized weakness, Fatigue, Impaired balance, Impaired activity tolerance (Fall Risk)    Plan    Bed mobility and transfer training with spine precautions, gait training as tolerated, therapeutic exercise, safety education, car transfer as tolerated    Passport items to be completed:  Passport items to be completed:  Get in/out of bed safely, in/out of a vehicle, safely use mobility device, walk or wheel around home/community, navigate up and down stairs, show how to get up/down from the ground, ensure home is accessible, demonstrate HEP, complete caregiver training    Physical Therapy Problems (Active)     Problem: Balance     Dates: Start: 07/10/21       Goal: STG-Within one week, patient will maintain dynamic standing perform Harris balance     Dates: Start: 07/10/21             Problem: Mobility     Dates: Start: 07/10/21       Goal: STG-Within one week, patient will ambulate up/down a curb with LRAD CGA     Dates: Start: 07/10/21          Goal: STG-Within one week, patient will     Dates: Start: 07/13/21       Goal Note filed on 07/13/21 1249 by CHRISTINA Osullivan     1) Individualized goal:   Gait 4ww 450 ft supervision, one week  2) Interventions:  PT Gait Training, PT Therapeutic Exercises, and PT Therapeutic Activity               Problem: Mobility Transfers     Dates: Start: 07/10/21        Goal: STG-Within one week, patient will transfer bed to chair Josh w/c<>bed in room     Dates: Start: 07/10/21             Problem: PT-Long Term Goals     Dates: Start: 07/10/21       Goal: LTG-By discharge, patient will ambulate LRAD x300' Josh indoors     Dates: Start: 07/10/21          Goal: LTG-By discharge, patient will transfer one surface to another Josh with LRAD     Dates: Start: 07/10/21          Goal: LTG-By discharge, patient will perform home exercise program: Josh with amb and strengthening     Dates: Start: 07/10/21          Goal: LTG-By discharge, patient will transfer in/out of a car SPV with LRAD     Dates: Start: 07/10/21

## 2021-07-15 NOTE — PROGRESS NOTES
"Rehab Progress Note     Encounter Date: 7/15/2021    CC: back pain, knee pain    Interval Events (Subjective)  Patient sitting up in room. She reports ongoing pain. Discussed that we increased Gabapentin and it may take a few days to reach steady state. She reports her daughter has errands to run so she will be in tomorrow. Still with ongoing muscle spasms. She would like to be off of Lovenox. Discussed increased ambulation. She worked with PT today and walked > 300 feet so agreed to stop. Denies SOB. Denies NVD.     IDT Team Meeting 7/13/2021  DC/Disposition:  7/20/21    Objective:  VITAL SIGNS: /60   Pulse 81   Temp 36.8 °C (98.2 °F) (Tympanic)   Resp 18   Ht 1.676 m (5' 6\")   Wt 107 kg (235 lb 14.3 oz)   SpO2 94%   BMI 38.07 kg/m²   Gen: NAD  Psych: Mood and affect appropriate  CV: RRR, no edema  Resp: CTAB, no upper airway sounds  Abd: NTND  Neuro: AOx4, 5/5 BUE    No results found for this or any previous visit (from the past 72 hour(s)).    Current Facility-Administered Medications   Medication Frequency   • gabapentin (NEURONTIN) capsule 300 mg TID   • baclofen (LIORESAL) tablet 10 mg TID   • enoxaparin (LOVENOX) inj 40 mg QHS   • lidocaine (LIDODERM) 5 % 2 Patch DAILY   • atenolol (TENORMIN) tablet 50 mg Daily-0600   • Respiratory Therapy Consult Continuous RT   • oxyCODONE immediate-release (ROXICODONE) tablet 5 mg Q3HRS PRN    Or   • oxyCODONE immediate release (ROXICODONE) tablet 10 mg Q3HRS PRN   • hydrALAZINE (APRESOLINE) tablet 25 mg Q8HRS PRN   • acetaminophen (Tylenol) tablet 650 mg Q4HRS PRN   • senna-docusate (PERICOLACE or SENOKOT S) 8.6-50 MG per tablet 2 tablet BID    And   • polyethylene glycol/lytes (MIRALAX) PACKET 1 Packet QDAY PRN    And   • magnesium hydroxide (MILK OF MAGNESIA) suspension 30 mL QDAY PRN    And   • bisacodyl (DULCOLAX) suppository 10 mg QDAY PRN   • artificial tears ophthalmic solution 1 Drop PRN   • benzocaine-menthol (CEPACOL) lozenge 1 Lozenge Q2HRS PRN   • " mag hydrox-al hydrox-simeth (MAALOX PLUS ES or MYLANTA DS) suspension 20 mL Q2HRS PRN   • ondansetron (ZOFRAN ODT) dispertab 4 mg 4X/DAY PRN    Or   • ondansetron (ZOFRAN) syringe/vial injection 4 mg 4X/DAY PRN   • traZODone (DESYREL) tablet 50 mg QHS PRN   • sodium chloride (OCEAN) 0.65 % nasal spray 2 Spray PRN   • diazePAM (VALIUM) tablet 5 mg Q6HRS PRN   • traMADol (ULTRAM) 50 MG tablet 50 mg Q6HRS PRN       Orders Placed This Encounter   Procedures   • Diet Order Diet: Regular     Standing Status:   Standing     Number of Occurrences:   1     Order Specific Question:   Diet:     Answer:   Regular [1]       Assessment:  Active Hospital Problems    Diagnosis    • *Spinal fracture of T8 vertebra (HCC)    • Hypokalemia    • Trauma    • Hypoxia    • Thrombocytopenia (HCC)    • Anemia    • Debility    • HTN (hypertension)    • Obesity    • Hyponatremia        Medical Decision Making and Plan:  T8 wedge fracture - Patient with mechanical fall out of [place] with T8 wedge fracture managed non-operatively. TLSO when EOB or when supine if too painful.   -PT and OT for mobility and ADLs  -Follow-up with NSG     Pain Control - Patient on Gabapentin 100 mg BID and PRN Oxycodone. Also on Robaxin 750 mg QID  -Add Lidocaine patches.   -Change Robaxin to Baclofen 10 mg TID   -Increased Gabapentin to 300 mg TID    HTN/Arrhythmia - Patient on Atenolol 50 mg daily      Anemia - Check AM CBC - 10.2 stable. Check Fe panel      Thrombocytopenia - Check AM CBC - 163, improving.      Hyponatremia  - Check AM CMP - 135, improving. Recheck 7/16     Morbid Obesity due to excess calories - Patient with BMI of 37.0 on admission, meeting medical criteria. Dietitian to consult.      DVT Ppx - Patient on Heparin on transfer. Switched to Lovenox. Ambulating > 125 feet, discontinue.     Total time:  26 minutes.  I spent greater than 50% of the time for patient care, counseling, and coordination on this date, including unit/floor time, and  face-to-face time with the patient as per interval events and assessment and plan above. Topics discussed included discharge planning, back pain, continue Gabapentin, increased mobility, discontinue Lovenox and check Fe panel.     Sugey Penn M.D.

## 2021-07-15 NOTE — THERAPY
07/15/21 0929   Precautions   Precautions Fall Risk;Spinal / Back Precautions ;TLSO (Thoracolumbosacral orthosis)   Comments TLSO EOB, 2L O2   Pain 0 - 10 Group   Location Back   Location Orientation Mid   Therapist Pain Assessment Prior to Activity;During Activity;Post Activity   Cognition    Cognition / Consciousness WDL   Level of Consciousness Alert   Gait Functional Level of Assist    Gait Level Of Assist Stand by Assist   Assistive Device 4 Wheel Walker   Distance (Feet) 340  (340 ft x 2 inside, 360 ft outside, 500 ft inside 4ww)   # of Times Distance was Traveled 3   Deviation Antalgic;Bradykinetic;Decreased Heel Strike   Stairs Functional Level of Assist   Level of Assist with Stairs Stand by Assist   # of Stairs Climbed 10   Stairs Description Extra time;Hand rails;Limited by fatigue;Supervision for safety;Verbal cueing   Standing Lower Body Exercises   Hip Flexion 1 set of 15;Bilateral   Hip Extension 1 set of 15;Bilateral    Hip Abduction 1 set of 15;Bilateral   Heel Rise 1 set of 15;Bilateral   Mini Squat 1 set of 15;Partial   Bed Mobility    Supine to Sit Stand by Assist   Sit to Supine Stand by Assist   Sit to Stand Supervised   Scooting Stand by Assist   Rolling Supervised   Neuro-Muscular Treatments   Neuro-Muscular Treatments Sequencing;Verbal Cuing;Weight Shift Right;Weight Shift Left   Comments Sequencing up/down stairs, bed mobility   PT Total Time Spent   PT Individual Total Time Spent (Mins) 60   PT Charge Group   PT Gait Training 2   PT Therapeutic Exercise 1   PT Therapeutic Activities 1   Physical Therapy   Daily Treatment     Patient Name: Peg Louie  Age:  76 y.o., Sex:  female  Medical Record #: [id number]  Today's Date: 7/15/2021     Precautions  Precautions: Fall Risk, Spinal / Back Precautions , TLSO (Thoracolumbosacral orthosis)  Comments: TLSO EOB, 2L O2    Subjective    The pt agreed to PT. She said her back pain was improving.     Objective    The pt participated in  gait training with a 4ww with SBA. She tolerated 340 ft x 2 and 510 ft inside. Outside she was able to tolerate 360 ft x 1 on the sidewalk which included ramps and curves. The pt also went up/down 10 stairs SBA using bilateral handrails. She was able to do 1 set of 15 BLE therapeutic exercises in the parallel bars.    Assessment    The pt is making progress in therapy for gait and endurance. She is limited by pain, back spasms and impaired endurance.  Strengths: Able to follow instructions, Alert and oriented, Effective communication skills, Good carryover of learning, Supportive family, Pleasant and cooperative, Motivated for self care and independence, Making steady progress towards goals, Willingly participates in therapeutic activities  Barriers: Pain, Generalized weakness, Fatigue, Impaired balance, Impaired activity tolerance (Fall Risk)    Plan    Safety education, therapeutic exercise, gait training, spine precautions, stairs, car transfer    Passport items to be completed:  Passport items to be completed:  Get in/out of bed safely, in/out of a vehicle, safely use mobility device, walk or wheel around home/community, navigate up and down stairs, show how to get up/down from the ground, ensure home is accessible, demonstrate HEP, complete caregiver training    Physical Therapy Problems (Active)     Problem: Balance     Dates: Start: 07/10/21       Goal: STG-Within one week, patient will maintain dynamic standing perform Harris balance     Dates: Start: 07/10/21             Problem: Mobility     Dates: Start: 07/10/21       Goal: STG-Within one week, patient will ambulate up/down a curb with LRAD CGA     Dates: Start: 07/10/21          Goal: STG-Within one week, patient will     Dates: Start: 07/13/21       Goal Note filed on 07/13/21 1249 by CHRISTINA Osullivan     1) Individualized goal:   Gait 4ww 450 ft supervision, one week  2) Interventions:  PT Gait Training, PT Therapeutic Exercises, and PT Therapeutic  Activity               Problem: Mobility Transfers     Dates: Start: 07/10/21       Goal: STG-Within one week, patient will transfer bed to chair Josh w/c<>bed in room     Dates: Start: 07/10/21             Problem: PT-Long Term Goals     Dates: Start: 07/10/21       Goal: LTG-By discharge, patient will ambulate LRAD x300' Josh indoors     Dates: Start: 07/10/21          Goal: LTG-By discharge, patient will transfer one surface to another Josh with LRAD     Dates: Start: 07/10/21          Goal: LTG-By discharge, patient will perform home exercise program: Josh with amb and strengthening     Dates: Start: 07/10/21          Goal: LTG-By discharge, patient will transfer in/out of a car SPV with LRAD     Dates: Start: 07/10/21

## 2021-07-16 LAB
ANION GAP SERPL CALC-SCNC: 13 MMOL/L (ref 7–16)
BUN SERPL-MCNC: 21 MG/DL (ref 8–22)
CALCIUM SERPL-MCNC: 9.6 MG/DL (ref 8.5–10.5)
CHLORIDE SERPL-SCNC: 98 MMOL/L (ref 96–112)
CO2 SERPL-SCNC: 25 MMOL/L (ref 20–33)
CREAT SERPL-MCNC: 0.75 MG/DL (ref 0.5–1.4)
ERYTHROCYTE [DISTWIDTH] IN BLOOD BY AUTOMATED COUNT: 51 FL (ref 35.9–50)
FERRITIN SERPL-MCNC: 161 NG/ML (ref 10–291)
GLUCOSE SERPL-MCNC: 88 MG/DL (ref 65–99)
HCT VFR BLD AUTO: 33.2 % (ref 37–47)
HGB BLD-MCNC: 10.1 G/DL (ref 12–16)
HGB RETIC QN AUTO: 30.9 PG/CELL (ref 29–35)
IMM RETICS NFR: 35.5 % (ref 9.3–17.4)
IRON SATN MFR SERPL: 18 % (ref 15–55)
IRON SERPL-MCNC: 52 UG/DL (ref 40–170)
MCH RBC QN AUTO: 26.9 PG (ref 27–33)
MCHC RBC AUTO-ENTMCNC: 30.4 G/DL (ref 33.6–35)
MCV RBC AUTO: 88.3 FL (ref 81.4–97.8)
PLATELET # BLD AUTO: 199 K/UL (ref 164–446)
PMV BLD AUTO: 10.6 FL (ref 9–12.9)
POTASSIUM SERPL-SCNC: 4.1 MMOL/L (ref 3.6–5.5)
RBC # BLD AUTO: 3.76 M/UL (ref 4.2–5.4)
RETICS # AUTO: 0.1 M/UL (ref 0.04–0.06)
RETICS/RBC NFR: 2.5 % (ref 0.8–2.1)
SODIUM SERPL-SCNC: 136 MMOL/L (ref 135–145)
TIBC SERPL-MCNC: 282 UG/DL (ref 250–450)
UIBC SERPL-MCNC: 230 UG/DL (ref 110–370)
VIT B12 SERPL-MCNC: 1016 PG/ML (ref 211–911)
WBC # BLD AUTO: 5.4 K/UL (ref 4.8–10.8)

## 2021-07-16 PROCEDURE — A9270 NON-COVERED ITEM OR SERVICE: HCPCS | Performed by: PHYSICAL MEDICINE & REHABILITATION

## 2021-07-16 PROCEDURE — 80048 BASIC METABOLIC PNL TOTAL CA: CPT

## 2021-07-16 PROCEDURE — 83550 IRON BINDING TEST: CPT

## 2021-07-16 PROCEDURE — 85046 RETICYTE/HGB CONCENTRATE: CPT

## 2021-07-16 PROCEDURE — 97530 THERAPEUTIC ACTIVITIES: CPT

## 2021-07-16 PROCEDURE — 700102 HCHG RX REV CODE 250 W/ 637 OVERRIDE(OP): Performed by: PHYSICAL MEDICINE & REHABILITATION

## 2021-07-16 PROCEDURE — 97116 GAIT TRAINING THERAPY: CPT

## 2021-07-16 PROCEDURE — 85027 COMPLETE CBC AUTOMATED: CPT

## 2021-07-16 PROCEDURE — 36415 COLL VENOUS BLD VENIPUNCTURE: CPT

## 2021-07-16 PROCEDURE — 82728 ASSAY OF FERRITIN: CPT

## 2021-07-16 PROCEDURE — 99232 SBSQ HOSP IP/OBS MODERATE 35: CPT | Performed by: HOSPITALIST

## 2021-07-16 PROCEDURE — 97110 THERAPEUTIC EXERCISES: CPT

## 2021-07-16 PROCEDURE — 94760 N-INVAS EAR/PLS OXIMETRY 1: CPT

## 2021-07-16 PROCEDURE — 83540 ASSAY OF IRON: CPT

## 2021-07-16 PROCEDURE — 82607 VITAMIN B-12: CPT

## 2021-07-16 PROCEDURE — 770010 HCHG ROOM/CARE - REHAB SEMI PRIVAT*

## 2021-07-16 PROCEDURE — 97535 SELF CARE MNGMENT TRAINING: CPT

## 2021-07-16 PROCEDURE — 99233 SBSQ HOSP IP/OBS HIGH 50: CPT | Performed by: PHYSICAL MEDICINE & REHABILITATION

## 2021-07-16 RX ORDER — OXYCODONE HYDROCHLORIDE 5 MG/1
5 TABLET ORAL
Status: DISCONTINUED | OUTPATIENT
Start: 2021-07-16 | End: 2021-07-20 | Stop reason: HOSPADM

## 2021-07-16 RX ORDER — BACLOFEN 10 MG/1
5 TABLET ORAL 3 TIMES DAILY
Status: DISCONTINUED | OUTPATIENT
Start: 2021-07-16 | End: 2021-07-20 | Stop reason: HOSPADM

## 2021-07-16 RX ORDER — GABAPENTIN 400 MG/1
400 CAPSULE ORAL 3 TIMES DAILY
Status: DISCONTINUED | OUTPATIENT
Start: 2021-07-16 | End: 2021-07-20 | Stop reason: HOSPADM

## 2021-07-16 RX ORDER — OXYCODONE HYDROCHLORIDE 5 MG/1
2.5 TABLET ORAL
Status: DISCONTINUED | OUTPATIENT
Start: 2021-07-16 | End: 2021-07-20 | Stop reason: HOSPADM

## 2021-07-16 RX ADMIN — ATENOLOL 50 MG: 25 TABLET ORAL at 10:41

## 2021-07-16 RX ADMIN — SENNOSIDES AND DOCUSATE SODIUM 2 TABLET: 8.6; 5 TABLET ORAL at 21:48

## 2021-07-16 RX ADMIN — GABAPENTIN 400 MG: 400 CAPSULE ORAL at 16:49

## 2021-07-16 RX ADMIN — GABAPENTIN 300 MG: 300 CAPSULE ORAL at 08:40

## 2021-07-16 RX ADMIN — OXYCODONE HYDROCHLORIDE 10 MG: 10 TABLET ORAL at 04:37

## 2021-07-16 RX ADMIN — OXYCODONE HYDROCHLORIDE 10 MG: 10 TABLET ORAL at 08:39

## 2021-07-16 RX ADMIN — OXYCODONE HYDROCHLORIDE 10 MG: 10 TABLET ORAL at 11:51

## 2021-07-16 RX ADMIN — SENNOSIDES AND DOCUSATE SODIUM 2 TABLET: 8.6; 5 TABLET ORAL at 08:40

## 2021-07-16 RX ADMIN — OXYCODONE 5 MG: 5 TABLET ORAL at 16:48

## 2021-07-16 RX ADMIN — OXYCODONE 5 MG: 5 TABLET ORAL at 21:48

## 2021-07-16 RX ADMIN — BACLOFEN 5 MG: 10 TABLET ORAL at 21:48

## 2021-07-16 RX ADMIN — BACLOFEN 5 MG: 10 TABLET ORAL at 16:48

## 2021-07-16 RX ADMIN — GABAPENTIN 400 MG: 400 CAPSULE ORAL at 21:48

## 2021-07-16 RX ADMIN — BACLOFEN 10 MG: 10 TABLET ORAL at 08:40

## 2021-07-16 ASSESSMENT — GAIT ASSESSMENTS
DISTANCE (FEET): 360
ASSISTIVE DEVICE: 4 WHEEL WALKER
DISTANCE (FEET): 220
GAIT LEVEL OF ASSIST: STAND BY ASSIST
DEVIATION: ANTALGIC;BRADYKINETIC;DECREASED HEEL STRIKE
ASSISTIVE DEVICE: 4 WHEEL WALKER
GAIT LEVEL OF ASSIST: STAND BY ASSIST
DEVIATION: ANTALGIC;BRADYKINETIC;DECREASED HEEL STRIKE

## 2021-07-16 ASSESSMENT — PAIN DESCRIPTION - PAIN TYPE
TYPE: ACUTE PAIN

## 2021-07-16 ASSESSMENT — ENCOUNTER SYMPTOMS
CHILLS: 0
NAUSEA: 0
VOMITING: 0
FEVER: 0
DIARRHEA: 0
NERVOUS/ANXIOUS: 0
SHORTNESS OF BREATH: 0
ABDOMINAL PAIN: 0

## 2021-07-16 ASSESSMENT — ACTIVITIES OF DAILY LIVING (ADL)
TOILET_TRANSFER_DESCRIPTION: ADAPTIVE EQUIPMENT;GRAB BAR;INCREASED TIME
BED_CHAIR_WHEELCHAIR_TRANSFER_DESCRIPTION: INCREASED TIME;SET-UP OF EQUIPMENT;SUPERVISION FOR SAFETY;ADAPTIVE EQUIPMENT
BED_CHAIR_WHEELCHAIR_TRANSFER_DESCRIPTION: ADAPTIVE EQUIPMENT;INCREASED TIME;SUPERVISION FOR SAFETY
TOILET_TRANSFER_DESCRIPTION: GRAB BAR;SET-UP OF EQUIPMENT;SUPERVISION FOR SAFETY
TOILETING_LEVEL_OF_ASSIST_DESCRIPTION: GRAB BAR;INCREASED TIME;SUPERVISION FOR SAFETY
TOILETING_LEVEL_OF_ASSIST_DESCRIPTION: GRAB BAR;INCREASED TIME;SUPERVISION FOR SAFETY
BED_CHAIR_WHEELCHAIR_TRANSFER_DESCRIPTION: ADAPTIVE EQUIPMENT;INCREASED TIME;SUPERVISION FOR SAFETY

## 2021-07-16 NOTE — FLOWSHEET NOTE
07/15/21 1946   Events/Summary/Plan   Events/Summary/Plan SpO2 check   Vital Signs   Pulse 82   Respiration 12   Pulse Oximetry 92 %   $ Pulse Oximetry (Spot Check) Yes   Oxygen   O2 (LPM) 2   O2 Delivery Device Nasal Cannula

## 2021-07-16 NOTE — THERAPY
Occupational Therapy  Daily Treatment     Patient Name: Peg Louie  Age:  76 y.o., Sex:  female  Medical Record #: [id number]  Today's Date: 7/16/2021     Precautions  Precautions: (P) Fall Risk, TLSO (Thoracolumbosacral orthosis), Spinal / Back Precautions   Comments: (P) TLSO EOB, 2 L O2         Subjective    Pt agreeable to OT session      Objective       07/16/21 1301   Precautions   Precautions Fall Risk;TLSO (Thoracolumbosacral orthosis);Spinal / Back Precautions    Comments TLSO EOB, 2 L O2   Vitals   Pulse Oximetry (!) 81 %   O2 (LPM) 2   Vitals Comments During exercise, pt able to increase to 90s with a few deep breaths, pt placed on 4 L while exercising   Functional Level of Assist   Toileting Supervision   Toileting Description Grab bar;Increased time;Supervision for safety   Toilet Transfers Supervised   Toilet Transfer Description Grab bar;Set-up of equipment;Supervision for safety   Sitting Upper Body Exercises   Tricep Press 3 sets of 15;Bilateral;Weight (See Comments for lbs)  (35#)   IADL Treatments   Kitchen Mobility Education Pt located spices and cooking pans in kitchen to prepair for meal prep activity tomororw. Pt demonstrated good safety awareness while completing activity. Pt required supervision for safety.    Home Management Pt compelted laundry task with supervsion. Pt loaded washer and managed washing machine with supervision for balance safety. Pt had 0 LOB during activity.    Sitting Lower Body Exercises   Nustep Resistance Level 4  (10 minutes, break every 2 minutes)   Interdisciplinary Plan of Care Collaboration   Patient Position at End of Therapy Seated;Edge of Bed;Phone within Reach;Call Light within Reach;Tray Table within Reach   OT Total Time Spent   OT Individual Total Time Spent (Mins) 60   OT Charge Group   OT Self Care / ADL 1   OT Therapy Activity 1   OT Therapeutic Exercise  2   Respiratory therapist was consulted due to pts decreased O2 levels while exercising.  Pt would drop into low 80s while on Nustep and required break with deep breaths to increase O2 to 90s. Respiratory therapist educated OT and pt to use as much O2 as the pt need to stay in the 90s while exercising and use a tank if necessary. OT increased O2 to 4 L and pt was able to complete rest of therapy with O2 levels in the 90s.     Assessment    Pt tolerated session well focused on endurance, UE strengthening and home management. Pt cont to demonstrate good balance while completing activities and walking in gym and hallways. Pt cont to be limited by increased back pain and decreased endurance.  Strengths: Able to follow instructions, Alert and oriented, Effective communication skills, Good insight into deficits/needs, Independent prior level of function, Making steady progress towards goals, Motivated for self care and independence, Pleasant and cooperative, Supportive family, Willingly participates in therapeutic activities  Barriers: Generalized weakness, Impaired activity tolerance, Impaired balance, Pain, Pain poorly managed    Plan    Endurance, UE strengthening, balance, functional transfers/mobility     Passport items to be completed:  Perform bathroom transfers, complete dressing, complete feeding, get ready for the day, prepare a simple meal, participate in household tasks, adapt home for safety needs, demonstrate home exercise program, complete caregiver training        Occupational Therapy Goals (Active)     Problem: Bathing     Dates: Start: 07/10/21       Goal: STG-Within one week, patient will bathe with supervision using AE and DME as needed.     Dates: Start: 07/10/21       Goal Note filed on 07/13/21 1318 by Alejandra Wan, MS,OTR/L     Requires Min A               Problem: Functional Transfers     Dates: Start: 07/10/21       Goal: STG-Within one week, patient will transfer to toilet with supervision.     Dates: Start: 07/10/21       Goal Note filed on 07/13/21 1318 by Alejandra Wan,  MS,OTR/L     Requires SBA               Problem: IADL's     Dates: Start: 07/13/21       Goal: STG-Within one week, patient will prepare a meal with SBA at 4WW level     Dates: Start: 07/13/21             Problem: OT Long Term Goals     Dates: Start: 07/10/21       Goal: LTG-By discharge, patient will complete basic self care tasks with mod I to supervision.      Dates: Start: 07/10/21          Goal: LTG-By discharge, patient will perform bathroom transfers with mod I to supervision.     Dates: Start: 07/10/21

## 2021-07-16 NOTE — PROGRESS NOTES
"Rehab Progress Note     Encounter Date: 7/16/2021    CC: back pain, knee pain    Interval Events (Subjective)  Patient sitting up in room. She reports therapy is going well. She reports she did not get her Atenolol today due to BP and now is dizzy and anxious. She reports she has been on it for years and needs to get it every day.  Discussed will change the hold parameters. She denies NVD.  Discussed with daughter about discharge plan. Patient is medically cleared and walking supervs for > 250 feet. Discussed she will be cleared on Tuesday. Daughter has a lot of concerns about Tuesday. Patient is medically cleared for flying. Daughter does not have a flight yet. She reports she does not have a PCP in [facility]. Discussed will need a physician with [place] for home health therapies. Discussed can make a referral to SNF in [facility] but insurance will not pay for transport. She has concern about her getting around the airport. Discussed calling the company to meet her at the entrance once she books a flight. Discussed that we will reduce pain medications as daughter is concerned about her self weaning.     IDT Team Meeting 7/13/2021  DC/Disposition:  7/20/21    Objective:  VITAL SIGNS: /71   Pulse 90   Temp 36.9 °C (98.4 °F) (Temporal)   Resp 18   Ht 1.676 m (5' 6\")   Wt 107 kg (235 lb 14.3 oz)   SpO2 96%   BMI 38.07 kg/m²   Gen: NAD  Psych: Mood and affect appropriate  CV: RRR, no edema  Resp: CTAB, no upper airway sounds  Abd: NTND  Neuro: AOx4, 5/5 BUE  Unchanged from 7/15/21     Recent Results (from the past 72 hour(s))   CBC WITHOUT DIFFERENTIAL    Collection Time: 07/16/21  5:37 AM   Result Value Ref Range    WBC 5.4 4.8 - 10.8 K/uL    RBC 3.76 (L) 4.20 - 5.40 M/uL    Hemoglobin 10.1 (L) 12.0 - 16.0 g/dL    Hematocrit 33.2 (L) 37.0 - 47.0 %    MCV 88.3 81.4 - 97.8 fL    MCH 26.9 (L) 27.0 - 33.0 pg    MCHC 30.4 (L) 33.6 - 35.0 g/dL    RDW 51.0 (H) 35.9 - 50.0 fL    Platelet Count 199 164 - 446 K/uL    MPV " 10.6 9.0 - 12.9 fL   RETICULOCYTES COUNT    Collection Time: 07/16/21  5:37 AM   Result Value Ref Range    Reticulocyte Count 2.5 (H) 0.8 - 2.1 %    Retic, Absolute 0.10 (H) 0.04 - 0.06 M/uL    Imm. Reticulocyte Fraction 35.5 (H) 9.3 - 17.4 %    Retic Hgb Equivalent 30.9 29.0 - 35.0 pg/cell   IRON/TOTAL IRON BIND    Collection Time: 07/16/21  5:37 AM   Result Value Ref Range    Iron 52 40 - 170 ug/dL    Total Iron Binding 282 250 - 450 ug/dL    Unsat Iron Binding 230 110 - 370 ug/dL    % Saturation 18 15 - 55 %   Basic Metabolic Panel    Collection Time: 07/16/21  5:37 AM   Result Value Ref Range    Sodium 136 135 - 145 mmol/L    Potassium 4.1 3.6 - 5.5 mmol/L    Chloride 98 96 - 112 mmol/L    Co2 25 20 - 33 mmol/L    Glucose 88 65 - 99 mg/dL    Bun 21 8 - 22 mg/dL    Creatinine 0.75 0.50 - 1.40 mg/dL    Calcium 9.6 8.5 - 10.5 mg/dL    Anion Gap 13.0 7.0 - 16.0   FERRITIN    Collection Time: 07/16/21  5:37 AM   Result Value Ref Range    Ferritin 161.0 10.0 - 291.0 ng/mL   VITAMIN B12    Collection Time: 07/16/21  5:37 AM   Result Value Ref Range    Vitamin B12 -True Cobalamin 1016 (H) 211 - 911 pg/mL   ESTIMATED GFR    Collection Time: 07/16/21  5:37 AM   Result Value Ref Range    GFR If African American >60 >60 mL/min/1.73 m 2    GFR If Non African American >60 >60 mL/min/1.73 m 2       Current Facility-Administered Medications   Medication Frequency   • baclofen (LIORESAL) tablet 5 mg TID   • gabapentin (NEURONTIN) capsule 400 mg TID   • oxyCODONE immediate-release (ROXICODONE) tablet 2.5 mg Q3HRS PRN    Or   • oxyCODONE immediate-release (ROXICODONE) tablet 5 mg Q3HRS PRN   • lidocaine (LIDODERM) 5 % 2 Patch DAILY   • atenolol (TENORMIN) tablet 50 mg Daily-0600   • Respiratory Therapy Consult Continuous RT   • hydrALAZINE (APRESOLINE) tablet 25 mg Q8HRS PRN   • acetaminophen (Tylenol) tablet 650 mg Q4HRS PRN   • senna-docusate (PERICOLACE or SENOKOT S) 8.6-50 MG per tablet 2 tablet BID    And   • polyethylene  glycol/lytes (MIRALAX) PACKET 1 Packet QDAY PRN    And   • magnesium hydroxide (MILK OF MAGNESIA) suspension 30 mL QDAY PRN    And   • bisacodyl (DULCOLAX) suppository 10 mg QDAY PRN   • artificial tears ophthalmic solution 1 Drop PRN   • benzocaine-menthol (CEPACOL) lozenge 1 Lozenge Q2HRS PRN   • mag hydrox-al hydrox-simeth (MAALOX PLUS ES or MYLANTA DS) suspension 20 mL Q2HRS PRN   • ondansetron (ZOFRAN ODT) dispertab 4 mg 4X/DAY PRN    Or   • ondansetron (ZOFRAN) syringe/vial injection 4 mg 4X/DAY PRN   • traZODone (DESYREL) tablet 50 mg QHS PRN   • sodium chloride (OCEAN) 0.65 % nasal spray 2 Spray PRN   • diazePAM (VALIUM) tablet 5 mg Q6HRS PRN   • traMADol (ULTRAM) 50 MG tablet 50 mg Q6HRS PRN       Orders Placed This Encounter   Procedures   • Diet Order Diet: Regular     Standing Status:   Standing     Number of Occurrences:   1     Order Specific Question:   Diet:     Answer:   Regular [1]       Assessment:  Active Hospital Problems    Diagnosis    • *Spinal fracture of T8 vertebra (HCC)    • Hypokalemia    • Trauma    • Hypoxia    • Thrombocytopenia (HCC)    • Anemia    • Debility    • HTN (hypertension)    • Obesity    • Hyponatremia        Medical Decision Making and Plan:  T8 wedge fracture - Patient with mechanical fall out of [place] with T8 wedge fracture managed non-operatively. TLSO when EOB or when supine if too painful.   -PT and OT for mobility and ADLs  -Follow-up with NSG     Pain Control - Patient on Gabapentin 100 mg BID and PRN Oxycodone. Also on Robaxin 750 mg QID  -Add Lidocaine patches. Reduce Oxycodone to 5 mg PRN  -Change Robaxin to Baclofen 10 mg TID -> Reduce to 5 mg   -Increased Gabapentin to 300 mg TID    HTN/Arrhythmia - Patient on Atenolol 50 mg daily      Anemia - Check AM CBC - 10.2 stable. Check Fe panel - wnl. B12 normal.      Thrombocytopenia - Check AM CBC - 163, improving. 199 on 7/16/21. Resolved.     Hyponatremia  - Check AM CMP - 135, improving. Recheck 7/16 - 136,  stable     Morbid Obesity due to excess calories - Patient with BMI of 37.0 on admission, meeting medical criteria. Dietitian to consult.      DVT Ppx - Patient on Heparin on transfer. Switched to Lovenox. Ambulating > 125 feet, discontinue.     Total time:  36 minutes.  I spent greater than 50% of the time for patient care, counseling, and coordination on this date, including unit/floor time, and face-to-face time with the patient as per interval events and assessment and plan above. Topics discussed included discharge planning, discussion with daughter, medical cleared for flying, atenolol, reduce Baclofen, and reduce opiates.     Sugey Penn M.D.

## 2021-07-16 NOTE — PROGRESS NOTES
[facility] Medicine Daily Progress Note    Date of Service  7/16/2021    Chief Complaint:  Hypertension  Hypoxia  Pneumonia    Interval History:  No significant events or changes since last visit    Review of Systems  Review of Systems   Constitutional: Negative for chills and fever.   Respiratory: Negative for shortness of breath.    Cardiovascular: Negative for chest pain.   Gastrointestinal: Negative for abdominal pain, diarrhea, nausea and vomiting.   Psychiatric/Behavioral: The patient is not nervous/anxious.         Physical Exam  Temp:  [36.4 °C (97.5 °F)-36.9 °C (98.4 °F)] 36.9 °C (98.4 °F)  Pulse:  [67-82] 75  Resp:  [12-18] 18  BP: (100-109)/(53-71) 109/71  SpO2:  [89 %-94 %] 94 %    Physical Exam  Vitals and nursing note reviewed.   Constitutional:       Appearance: Normal appearance.   HENT:      Head: Atraumatic.      Mouth/Throat:      Mouth: Mucous membranes are moist.   Eyes:      Conjunctiva/sclera: Conjunctivae normal.      Pupils: Pupils are equal, round, and reactive to light.   Neck:      Vascular: No JVD.   Cardiovascular:      Rate and Rhythm: Normal rate and regular rhythm.      Heart sounds: Normal heart sounds.   Pulmonary:      Effort: Pulmonary effort is normal.      Breath sounds: Normal breath sounds.   Abdominal:      General: Bowel sounds are normal.      Palpations: Abdomen is soft.   Musculoskeletal:      Cervical back: Normal range of motion and neck supple.      Right lower leg: No edema.      Left lower leg: No edema.   Skin:     General: Skin is warm and dry.   Neurological:      Mental Status: She is alert and oriented to person, place, and time.   Psychiatric:         Mood and Affect: Mood normal.         Behavior: Behavior normal.         Fluids    Intake/Output Summary (Last 24 hours) at 7/16/2021 0852  Last data filed at 7/16/2021 0544  Gross per 24 hour   Intake 360 ml   Output 400 ml   Net -40 ml       Laboratory  Recent Labs     07/16/21  0537   WBC 5.4   RBC 3.76*  "  HEMOGLOBIN 10.1*   HEMATOCRIT 33.2*   MCV 88.3   MCH 26.9*   MCHC 30.4*   RDW 51.0*   PLATELETCT 199   MPV 10.6                       Imaging    Assessment/Plan  Hypoxia  Assessment & Plan  Denies chest pain, palpitations, or calf pain  On O2 supplementation  Covid: negative 7/9/21  CXR (7/10): Ill-defined right perihilar opacity could represent atelectasis versus pneumonitis  CXR (7/12): unchanged  S/P Zosyn  S/P Omnicef (7/14)  2nd to pneumonia and/or having had \"shallow breathing from pain\"  Encouraging IS    Trauma  Assessment & Plan  S/P mechanical GLF -- fell out of camper onto back  Has T7 spinous process fx and T8 wedge fx  Non-surgical management per Neurosurgery  TLSO when OOB    Hypokalemia  Assessment & Plan  K+: 3.4 --> 4.1 (7/16)  S/P supplements x 3 days  Monitor    Anemia- (present on admission)  Assessment & Plan  H&H stable (7/16)  Fe: 52, sats 18%  B12: 1016  Monitor    HTN (hypertension)- (present on admission)  Assessment & Plan  BP ok  On Atenolol  Monitor    "

## 2021-07-16 NOTE — THERAPY
07/16/21 1429   Precautions   Precautions Fall Risk;TLSO (Thoracolumbosacral orthosis);Spinal / Back Precautions    Comments TLSO EOB, 2L O2   Pain 0 - 10 Group   Location Back   Location Orientation Mid   Therapist Pain Assessment 6;Prior to Activity;During Activity   Gait Functional Level of Assist    Gait Level Of Assist Stand by Assist   Assistive Device 4 Wheel Walker   Distance (Feet) 220   # of Times Distance was Traveled 2   Deviation Antalgic;Bradykinetic;Decreased Heel Strike   Transfer Functional Level of Assist   Bed, Chair, Wheelchair Transfer Supervised   Bed Chair Wheelchair Transfer Description Adaptive equipment;Increased time;Supervision for safety   Standing Lower Body Exercises   Hip Flexion 2 sets of 15;Bilateral   Hip Extension 2 sets of 15;Bilateral    Hip Abduction 2 sets of 15;Bilateral   Heel Rise 2 sets of 15;Bilateral   Mini Squat 2 sets of 15;Partial   Bed Mobility    Supine to Sit Supervised   Sit to Supine Supervised   Sit to Stand Supervised   Scooting Supervised   Rolling Supervised   PT Total Time Spent   PT Individual Total Time Spent (Mins) 30   PT Charge Group   PT Gait Training 1   PT Therapeutic Exercise 1   Physical Therapy   Daily Treatment     Patient Name: Peg Louie  Age:  76 y.o., Sex:  female  Medical Record #: [id number]  Today's Date: 7/16/2021     Precautions  Precautions: Fall Risk, TLSO (Thoracolumbosacral orthosis), Spinal / Back Precautions   Comments: TLSO EOB, 2L O2    Subjective    The pt agreed to PT. She said she has not had symptoms of low BP.     Objective    The pt sat EOB to adjust the TLSO. She participated in gait training with a 4ww and tolerated 220 ft x 2 SBA. She also participated in standing BLE therapeutic exercise 2 sets of 15 in the parallel bars. She had no symptoms of hypotension.    Assessment    The pt tolerated activity without symptoms of hypotension. She is alert and oriented but the pain medications impact her thinking and  problem solving.   She should continue to have supervision for out of bed activities.                                                                                              Strengths: Able to follow instructions, Alert and oriented, Effective communication skills, Good carryover of learning, Supportive family, Pleasant and cooperative, Motivated for self care and independence, Making steady progress towards goals, Willingly participates in therapeutic activities  Barriers: Pain, Generalized weakness, Fatigue, Impaired balance, Impaired activity tolerance (Fall Risk)    Plan    Safety education, gait training, therapeutic exercise, TLSO practice, car transfer    Passport items to be completed:  Passport items to be completed:  Get in/out of bed safely, in/out of a vehicle, safely use mobility device, walk or wheel around home/community, navigate up and down stairs, show how to get up/down from the ground, ensure home is accessible, demonstrate HEP, complete caregiver training    Physical Therapy Problems (Active)     Problem: Balance     Dates: Start: 07/10/21       Goal: STG-Within one week, patient will maintain dynamic standing perform Harris balance     Dates: Start: 07/10/21             Problem: Mobility     Dates: Start: 07/10/21       Goal: STG-Within one week, patient will ambulate up/down a curb with LRAD CGA     Dates: Start: 07/10/21          Goal: STG-Within one week, patient will     Dates: Start: 07/13/21       Goal Note filed on 07/13/21 1249 by CHRISTINA Osullivan     1) Individualized goal:   Gait 4ww 450 ft supervision, one week  2) Interventions:  PT Gait Training, PT Therapeutic Exercises, and PT Therapeutic Activity               Problem: Mobility Transfers     Dates: Start: 07/10/21       Goal: STG-Within one week, patient will transfer bed to chair Josh w/c<>bed in room     Dates: Start: 07/10/21             Problem: PT-Long Term Goals     Dates: Start: 07/10/21       Goal: LTG-By  discharge, patient will ambulate LRAD x300' Josh indoors     Dates: Start: 07/10/21          Goal: LTG-By discharge, patient will transfer one surface to another Josh with LRAD     Dates: Start: 07/10/21          Goal: LTG-By discharge, patient will perform home exercise program: Josh with amb and strengthening     Dates: Start: 07/10/21          Goal: LTG-By discharge, patient will transfer in/out of a car SPV with LRAD     Dates: Start: 07/10/21

## 2021-07-16 NOTE — FLOWSHEET NOTE
07/16/21 1620   Events/Summary/Plan   Events/Summary/Plan SpO2 check   Vital Signs   Pulse 80   Respiration 16   Pulse Oximetry 92 %   $ Pulse Oximetry (Spot Check) Yes   Oxygen   O2 (LPM) 3   O2 Delivery Device Nasal Cannula

## 2021-07-16 NOTE — THERAPY
Occupational Therapy  Daily Treatment     Patient Name: Peg Louie  Age:  76 y.o., Sex:  female  Medical Record #: [id number]  Today's Date: 7/16/2021     Precautions  Precautions: (P) Fall Risk, TLSO (Thoracolumbosacral orthosis), Spinal / Back Precautions   Comments: (P) TLSO EOB, 2 L O2         Subjective    Pt agreeable to OT session.     Objective       07/16/21 0831   Precautions   Precautions Fall Risk;TLSO (Thoracolumbosacral orthosis);Spinal / Back Precautions    Comments TLSO EOB, 2 L O2   Vitals   Pulse 90   Patient BP Position Sitting   Blood Pressure  105/71   Pulse Oximetry 96 %   O2 (LPM) 2   O2 Delivery Device Nasal Cannula   Cognition    Level of Consciousness Alert   Functional Level of Assist   Grooming Modified Independent;Standing   Grooming Description Increased time  (oral care, brushing hair, washing face)   Upper Body Dressing Minimal Assist   Upper Body Dressing Description Increased time;Application of orthotic or brace  (assist with adjusting TLSO)   Lower Body Dressing Stand by Assist   Lower Body Dressing Description Increased time;Set-up of equipment;Supervision for safety  (don/doff slip on shoes seated EOB)   Toileting Supervision   Toileting Description Grab bar;Increased time;Supervision for safety   Bed, Chair, Wheelchair Transfer Supervised   Bed Chair Wheelchair Transfer Description Increased time;Set-up of equipment;Supervision for safety;Adaptive equipment  (4WW)   Toilet Transfers Supervised   Toilet Transfer Description Adaptive equipment;Grab bar;Increased time  (4WW)   Bed Mobility    Supine to Sit Supervised   Sit to Supine Supervised   Scooting Supervised   Rolling Supervised   Interdisciplinary Plan of Care Collaboration   IDT Collaboration with  Nursing   Patient Position at End of Therapy In Bed;Call Light within Reach;Tray Table within Reach;Phone within Reach   Collaboration Comments RN completing med pass at start of session.   OT Total Time Spent   OT  Individual Total Time Spent (Mins) 30   OT Charge Group   OT Self Care / ADL 2       Assessment    Pt tolerated OT session well. She reported back pain, but able to complete transfers and ADLs in standing at supervision to mod I level. Pt required assistance with adjusting straps on TLSO. Larger BP cuff placed in pt room for more accurate reading.  Strengths: Able to follow instructions, Alert and oriented, Effective communication skills, Good insight into deficits/needs, Independent prior level of function, Making steady progress towards goals, Motivated for self care and independence, Pleasant and cooperative, Supportive family, Willingly participates in therapeutic activities  Barriers: Generalized weakness, Impaired activity tolerance, Impaired balance, Pain, Pain poorly managed    Plan    Endurance, UE strengthening, balance, functional transfers/mobility     Passport items to be completed:  Perform bathroom transfers, complete dressing, complete feeding, get ready for the day, prepare a simple meal, participate in household tasks, adapt home for safety needs, demonstrate home exercise program, complete caregiver training     Occupational Therapy Goals (Active)     Problem: Bathing     Dates: Start: 07/10/21       Goal: STG-Within one week, patient will bathe with supervision using AE and DME as needed.     Dates: Start: 07/10/21       Goal Note filed on 07/13/21 1318 by Alejandra Wan, MS,OTR/L     Requires Min A               Problem: Functional Transfers     Dates: Start: 07/10/21       Goal: STG-Within one week, patient will transfer to toilet with supervision.     Dates: Start: 07/10/21       Goal Note filed on 07/13/21 1318 by Alejandra Wan, MS,OTR/L     Requires SBA               Problem: IADL's     Dates: Start: 07/13/21       Goal: STG-Within one week, patient will prepare a meal with SBA at 4WW level     Dates: Start: 07/13/21             Problem: OT Long Term Goals     Dates: Start: 07/10/21        Goal: LTG-By discharge, patient will complete basic self care tasks with mod I to supervision.      Dates: Start: 07/10/21          Goal: LTG-By discharge, patient will perform bathroom transfers with mod I to supervision.     Dates: Start: 07/10/21

## 2021-07-16 NOTE — CARE PLAN
The patient is Stable - Low risk of patient condition declining or worsening    Shift Goals  Patient Goals: pain control    Progress made toward(s) clinical / shift goals:    Problem: Pain - Standard  Goal: Alleviation of pain or a reduction in pain to the patient’s comfort goal  Outcome: Progressing  Sleeping throughout the night. Medicated with Oxycodone 10 mg for back pain X 1.     Problem: Infection  Goal: Patient will remain free from infection  Outcome: Progressing     Problem: Skin Integrity  Goal: Patient's skin integrity will be maintained or improve  Outcome: Progressing     Safety and fall precaution in placed. No fall event on NOC shift.

## 2021-07-16 NOTE — FLOWSHEET NOTE
07/16/21 1606   Events/Summary/Plan   Events/Summary/Plan SpO2 check on room air.   Vital Signs   Pulse 80   Respiration 16   Pulse Oximetry (!) 80 %   $ Pulse Oximetry (Spot Check) Yes   Oxygen   O2 Delivery Device None - Room Air   Room Air Challenge Fail  (Room Air SpO2=80)

## 2021-07-16 NOTE — THERAPY
07/16/21 1029   Precautions   Precautions Fall Risk;TLSO (Thoracolumbosacral orthosis);Spinal / Back Precautions    Comments TLSO EOB, 2L o2   Vitals   Pulse 97   Patient BP Position Standing 3 minutes   Blood Pressure  104/69   Vitals Comments C/O lightheadedness   Pain 0 - 10 Group   Location Back   Location Orientation Mid   Therapist Pain Assessment 6;Prior to Activity;During Activity   Gait Functional Level of Assist    Gait Level Of Assist Stand by Assist   Assistive Device 4 Wheel Walker   Distance (Feet) 360   # of Times Distance was Traveled 3   Deviation Antalgic;Bradykinetic;Decreased Heel Strike   Transfer Functional Level of Assist   Bed, Chair, Wheelchair Transfer Supervised   Bed Chair Wheelchair Transfer Description Adaptive equipment;Increased time;Supervision for safety  (4WW)   Bed Mobility    Supine to Sit Supervised   Sit to Supine Supervised   Sit to Stand Supervised   Scooting Supervised   Rolling Supervised   Neuro-Muscular Treatments   Neuro-Muscular Treatments Sequencing;Verbal Cuing   PT Total Time Spent   PT Individual Total Time Spent (Mins) 60   PT Charge Group   PT Gait Training 2   PT Therapeutic Activities 2   Physical Therapy   Daily Treatment     Patient Name: Peg Louie  Age:  76 y.o., Sex:  female  Medical Record #: [id number]  Today's Date: 7/16/2021     Precautions  Precautions: Fall Risk, TLSO (Thoracolumbosacral orthosis), Spinal / Back Precautions   Comments: TLSO EOB, 2 L O2    Subjective    The pt agreed to PT. She c/o low BP and lightheadness.      Objective  PT assisted putting on the TLSO EOB.  The pt said a medication was held this morning which resulted in low BP. Time was spent checking BP sitting and standing. She was low but not symptomatic. The pt participated in gait training with a 4ww and tolerated 360 ft x 3, SBA.     Assessment    The pt tolerated gait training without symptoms of hypotension even though she was lower than her normal.  Strengths:  Able to follow instructions, Alert and oriented, Effective communication skills, Good carryover of learning, Supportive family, Pleasant and cooperative, Motivated for self care and independence, Making steady progress towards goals, Willingly participates in therapeutic activities  Barriers: Pain, Generalized weakness, Fatigue, Impaired balance, Impaired activity tolerance (Fall Risk)    Plan    Safety education, therapeutic exercise, gait training, monitor BP as needed, car transfer    Passport items to be completed:  Passport items to be completed:  Get in/out of bed safely, in/out of a vehicle, safely use mobility device, walk or wheel around home/community, navigate up and down stairs, show how to get up/down from the ground, ensure home is accessible, demonstrate HEP, complete caregiver training    Physical Therapy Problems (Active)     Problem: Balance     Dates: Start: 07/10/21       Goal: STG-Within one week, patient will maintain dynamic standing perform Harris balance     Dates: Start: 07/10/21             Problem: Mobility     Dates: Start: 07/10/21       Goal: STG-Within one week, patient will ambulate up/down a curb with LRAD CGA     Dates: Start: 07/10/21          Goal: STG-Within one week, patient will     Dates: Start: 07/13/21       Goal Note filed on 07/13/21 1249 by CHRISTINA Osullivan     1) Individualized goal:   Gait 4ww 450 ft supervision, one week  2) Interventions:  PT Gait Training, PT Therapeutic Exercises, and PT Therapeutic Activity               Problem: Mobility Transfers     Dates: Start: 07/10/21       Goal: STG-Within one week, patient will transfer bed to chair Josh w/c<>bed in room     Dates: Start: 07/10/21             Problem: PT-Long Term Goals     Dates: Start: 07/10/21       Goal: LTG-By discharge, patient will ambulate LRAD x300' Josh indoors     Dates: Start: 07/10/21          Goal: LTG-By discharge, patient will transfer one surface to another Josh with LRAD     Dates:  Start: 07/10/21          Goal: LTG-By discharge, patient will perform home exercise program: Josh with amb and strengthening     Dates: Start: 07/10/21          Goal: LTG-By discharge, patient will transfer in/out of a car SPV with LRAD     Dates: Start: 07/10/21

## 2021-07-17 ENCOUNTER — APPOINTMENT (OUTPATIENT)
Dept: RADIOLOGY | Facility: MEDICAL CENTER | Age: 76
DRG: 175 | End: 2021-07-17
Attending: EMERGENCY MEDICINE
Payer: MEDICARE

## 2021-07-17 ENCOUNTER — APPOINTMENT (OUTPATIENT)
Dept: RADIOLOGY | Facility: REHABILITATION | Age: 76
DRG: 175 | End: 2021-07-17
Attending: HOSPITALIST
Payer: MEDICARE

## 2021-07-17 ENCOUNTER — HOSPITAL ENCOUNTER (INPATIENT)
Facility: MEDICAL CENTER | Age: 76
LOS: 10 days | DRG: 175 | End: 2021-07-27
Attending: EMERGENCY MEDICINE | Admitting: INTERNAL MEDICINE
Payer: MEDICARE

## 2021-07-17 VITALS
HEART RATE: 105 BPM | SYSTOLIC BLOOD PRESSURE: 122 MMHG | DIASTOLIC BLOOD PRESSURE: 73 MMHG | OXYGEN SATURATION: 92 % | TEMPERATURE: 98.5 F | RESPIRATION RATE: 18 BRPM | WEIGHT: 235.89 LBS | HEIGHT: 66 IN | BODY MASS INDEX: 37.91 KG/M2

## 2021-07-17 DIAGNOSIS — R09.02 HYPOXIA: ICD-10-CM

## 2021-07-17 DIAGNOSIS — I26.94 MULTIPLE SUBSEGMENTAL PULMONARY EMBOLI WITHOUT ACUTE COR PULMONALE (HCC): ICD-10-CM

## 2021-07-17 DIAGNOSIS — S22.069A CLOSED FRACTURE OF SEVENTH THORACIC VERTEBRA, UNSPECIFIED FRACTURE MORPHOLOGY, INITIAL ENCOUNTER (HCC): ICD-10-CM

## 2021-07-17 DIAGNOSIS — S22.068A OTHER CLOSED FRACTURE OF EIGHTH THORACIC VERTEBRA, INITIAL ENCOUNTER (HCC): ICD-10-CM

## 2021-07-17 DIAGNOSIS — R53.81 DEBILITY: ICD-10-CM

## 2021-07-17 DIAGNOSIS — E87.6 HYPOKALEMIA: ICD-10-CM

## 2021-07-17 DIAGNOSIS — R00.2 PALPITATIONS: ICD-10-CM

## 2021-07-17 DIAGNOSIS — I26.99 OTHER ACUTE PULMONARY EMBOLISM WITHOUT ACUTE COR PULMONALE (HCC): ICD-10-CM

## 2021-07-17 DIAGNOSIS — S22.069A CLOSED FRACTURE OF EIGHTH THORACIC VERTEBRA, UNSPECIFIED FRACTURE MORPHOLOGY, INITIAL ENCOUNTER (HCC): ICD-10-CM

## 2021-07-17 DIAGNOSIS — S22.069D CLOSED FRACTURE OF EIGHTH THORACIC VERTEBRA WITH ROUTINE HEALING, UNSPECIFIED FRACTURE MORPHOLOGY, SUBSEQUENT ENCOUNTER: ICD-10-CM

## 2021-07-17 DIAGNOSIS — D69.6 THROMBOCYTOPENIA (HCC): ICD-10-CM

## 2021-07-17 DIAGNOSIS — E87.1 HYPONATREMIA: ICD-10-CM

## 2021-07-17 DIAGNOSIS — S22.068A OTHER CLOSED FRACTURE OF SEVENTH THORACIC VERTEBRA, INITIAL ENCOUNTER (HCC): ICD-10-CM

## 2021-07-17 DIAGNOSIS — J96.01 ACUTE RESPIRATORY FAILURE WITH HYPOXIA (HCC): ICD-10-CM

## 2021-07-17 DIAGNOSIS — T14.90XA TRAUMA: ICD-10-CM

## 2021-07-17 LAB
ALBUMIN SERPL BCP-MCNC: 3.8 G/DL (ref 3.2–4.9)
ALBUMIN/GLOB SERPL: 1.3 G/DL
ALP SERPL-CCNC: 106 U/L (ref 30–99)
ALT SERPL-CCNC: 26 U/L (ref 2–50)
ANION GAP SERPL CALC-SCNC: 12 MMOL/L (ref 7–16)
APTT PPP: 34.8 SEC (ref 24.7–36)
APTT PPP: NORMAL SEC (ref 24.7–36)
AST SERPL-CCNC: 23 U/L (ref 12–45)
BASOPHILS # BLD AUTO: 0.6 % (ref 0–1.8)
BASOPHILS # BLD: 0.04 K/UL (ref 0–0.12)
BILIRUB SERPL-MCNC: 0.4 MG/DL (ref 0.1–1.5)
BUN SERPL-MCNC: 17 MG/DL (ref 8–22)
CALCIUM SERPL-MCNC: 9.3 MG/DL (ref 8.5–10.5)
CHLORIDE SERPL-SCNC: 98 MMOL/L (ref 96–112)
CO2 SERPL-SCNC: 26 MMOL/L (ref 20–33)
CREAT SERPL-MCNC: 0.8 MG/DL (ref 0.5–1.4)
EKG IMPRESSION: NORMAL
EOSINOPHIL # BLD AUTO: 0.02 K/UL (ref 0–0.51)
EOSINOPHIL NFR BLD: 0.3 % (ref 0–6.9)
ERYTHROCYTE [DISTWIDTH] IN BLOOD BY AUTOMATED COUNT: 50.3 FL (ref 35.9–50)
FLUAV RNA SPEC QL NAA+PROBE: NEGATIVE
FLUBV RNA SPEC QL NAA+PROBE: NEGATIVE
GLOBULIN SER CALC-MCNC: 3 G/DL (ref 1.9–3.5)
GLUCOSE SERPL-MCNC: 109 MG/DL (ref 65–99)
HCT VFR BLD AUTO: 34.5 % (ref 37–47)
HGB BLD-MCNC: 10.6 G/DL (ref 12–16)
IMM GRANULOCYTES # BLD AUTO: 0.04 K/UL (ref 0–0.11)
IMM GRANULOCYTES NFR BLD AUTO: 0.6 % (ref 0–0.9)
INR PPP: 1.2 (ref 0.87–1.13)
INR PPP: NORMAL (ref 0.87–1.13)
LYMPHOCYTES # BLD AUTO: 1.02 K/UL (ref 1–4.8)
LYMPHOCYTES NFR BLD: 14.5 % (ref 22–41)
MCH RBC QN AUTO: 26.8 PG (ref 27–33)
MCHC RBC AUTO-ENTMCNC: 30.7 G/DL (ref 33.6–35)
MCV RBC AUTO: 87.3 FL (ref 81.4–97.8)
MONOCYTES # BLD AUTO: 0.48 K/UL (ref 0–0.85)
MONOCYTES NFR BLD AUTO: 6.8 % (ref 0–13.4)
NEUTROPHILS # BLD AUTO: 5.42 K/UL (ref 2–7.15)
NEUTROPHILS NFR BLD: 77.2 % (ref 44–72)
NRBC # BLD AUTO: 0 K/UL
NRBC BLD-RTO: 0 /100 WBC
PLATELET # BLD AUTO: 200 K/UL (ref 164–446)
PMV BLD AUTO: 10 FL (ref 9–12.9)
POTASSIUM SERPL-SCNC: 4.7 MMOL/L (ref 3.6–5.5)
PROT SERPL-MCNC: 6.8 G/DL (ref 6–8.2)
PROTHROMBIN TIME: 14.8 SEC (ref 12–14.6)
PROTHROMBIN TIME: NORMAL SEC (ref 12–14.6)
RBC # BLD AUTO: 3.95 M/UL (ref 4.2–5.4)
RSV RNA SPEC QL NAA+PROBE: NEGATIVE
SARS-COV-2 RNA RESP QL NAA+PROBE: NOTDETECTED
SODIUM SERPL-SCNC: 136 MMOL/L (ref 135–145)
SPECIMEN SOURCE: NORMAL
TROPONIN T SERPL-MCNC: 61 NG/L (ref 6–19)
TROPONIN T SERPL-MCNC: 74 NG/L (ref 6–19)
TROPONIN T SERPL-MCNC: 76 NG/L (ref 6–19)
UFH PPP CHRO-ACNC: <0.1 IU/ML
UFH PPP CHRO-ACNC: NORMAL IU/ML
WBC # BLD AUTO: 7 K/UL (ref 4.8–10.8)

## 2021-07-17 PROCEDURE — 770024 HCHG ROOM/CARE - REHAB LOA (180)

## 2021-07-17 PROCEDURE — 0241U HCHG SARS-COV-2 COVID-19 NFCT DS RESP RNA 4 TRGT MIC: CPT

## 2021-07-17 PROCEDURE — 700102 HCHG RX REV CODE 250 W/ 637 OVERRIDE(OP): Performed by: EMERGENCY MEDICINE

## 2021-07-17 PROCEDURE — 99223 1ST HOSP IP/OBS HIGH 75: CPT | Mod: AI | Performed by: INTERNAL MEDICINE

## 2021-07-17 PROCEDURE — 700111 HCHG RX REV CODE 636 W/ 250 OVERRIDE (IP): Performed by: EMERGENCY MEDICINE

## 2021-07-17 PROCEDURE — 84484 ASSAY OF TROPONIN QUANT: CPT

## 2021-07-17 PROCEDURE — 99231 SBSQ HOSP IP/OBS SF/LOW 25: CPT | Performed by: HOSPITALIST

## 2021-07-17 PROCEDURE — 700102 HCHG RX REV CODE 250 W/ 637 OVERRIDE(OP): Performed by: PHYSICAL MEDICINE & REHABILITATION

## 2021-07-17 PROCEDURE — 770020 HCHG ROOM/CARE - TELE (206)

## 2021-07-17 PROCEDURE — 36415 COLL VENOUS BLD VENIPUNCTURE: CPT

## 2021-07-17 PROCEDURE — 85520 HEPARIN ASSAY: CPT

## 2021-07-17 PROCEDURE — 97530 THERAPEUTIC ACTIVITIES: CPT

## 2021-07-17 PROCEDURE — A9270 NON-COVERED ITEM OR SERVICE: HCPCS | Performed by: INTERNAL MEDICINE

## 2021-07-17 PROCEDURE — 96372 THER/PROPH/DIAG INJ SC/IM: CPT

## 2021-07-17 PROCEDURE — 700105 HCHG RX REV CODE 258: Performed by: INTERNAL MEDICINE

## 2021-07-17 PROCEDURE — 94760 N-INVAS EAR/PLS OXIMETRY 1: CPT

## 2021-07-17 PROCEDURE — 700117 HCHG RX CONTRAST REV CODE 255: Performed by: EMERGENCY MEDICINE

## 2021-07-17 PROCEDURE — 85730 THROMBOPLASTIN TIME PARTIAL: CPT

## 2021-07-17 PROCEDURE — C9803 HOPD COVID-19 SPEC COLLECT: HCPCS | Performed by: EMERGENCY MEDICINE

## 2021-07-17 PROCEDURE — 85610 PROTHROMBIN TIME: CPT

## 2021-07-17 PROCEDURE — 93005 ELECTROCARDIOGRAM TRACING: CPT

## 2021-07-17 PROCEDURE — 97116 GAIT TRAINING THERAPY: CPT

## 2021-07-17 PROCEDURE — 85025 COMPLETE CBC W/AUTO DIFF WBC: CPT

## 2021-07-17 PROCEDURE — 700101 HCHG RX REV CODE 250: Performed by: PHYSICAL MEDICINE & REHABILITATION

## 2021-07-17 PROCEDURE — 80053 COMPREHEN METABOLIC PANEL: CPT

## 2021-07-17 PROCEDURE — A9270 NON-COVERED ITEM OR SERVICE: HCPCS | Performed by: PHYSICAL MEDICINE & REHABILITATION

## 2021-07-17 PROCEDURE — 97535 SELF CARE MNGMENT TRAINING: CPT

## 2021-07-17 PROCEDURE — 93005 ELECTROCARDIOGRAM TRACING: CPT | Performed by: EMERGENCY MEDICINE

## 2021-07-17 PROCEDURE — 71275 CT ANGIOGRAPHY CHEST: CPT | Mod: ME

## 2021-07-17 PROCEDURE — 99285 EMERGENCY DEPT VISIT HI MDM: CPT

## 2021-07-17 PROCEDURE — A9270 NON-COVERED ITEM OR SERVICE: HCPCS | Performed by: EMERGENCY MEDICINE

## 2021-07-17 PROCEDURE — 99232 SBSQ HOSP IP/OBS MODERATE 35: CPT | Performed by: PHYSICAL MEDICINE & REHABILITATION

## 2021-07-17 PROCEDURE — 71045 X-RAY EXAM CHEST 1 VIEW: CPT

## 2021-07-17 PROCEDURE — 700102 HCHG RX REV CODE 250 W/ 637 OVERRIDE(OP): Performed by: INTERNAL MEDICINE

## 2021-07-17 RX ORDER — OXYCODONE HYDROCHLORIDE 5 MG/1
5 TABLET ORAL
Status: DISCONTINUED | OUTPATIENT
Start: 2021-07-17 | End: 2021-07-27 | Stop reason: HOSPADM

## 2021-07-17 RX ORDER — ATENOLOL 50 MG/1
50 TABLET ORAL DAILY
Status: DISCONTINUED | OUTPATIENT
Start: 2021-07-18 | End: 2021-07-18

## 2021-07-17 RX ORDER — HEPARIN SODIUM 5000 [USP'U]/100ML
0-30 INJECTION, SOLUTION INTRAVENOUS CONTINUOUS
Status: DISCONTINUED | OUTPATIENT
Start: 2021-07-17 | End: 2021-07-17

## 2021-07-17 RX ORDER — LIDOCAINE 50 MG/G
2 PATCH TOPICAL EVERY 12 HOURS
Status: DISCONTINUED | OUTPATIENT
Start: 2021-07-17 | End: 2021-07-27 | Stop reason: HOSPADM

## 2021-07-17 RX ORDER — ACETAMINOPHEN 325 MG/1
650 TABLET ORAL EVERY 6 HOURS PRN
Status: DISCONTINUED | OUTPATIENT
Start: 2021-07-17 | End: 2021-07-27 | Stop reason: HOSPADM

## 2021-07-17 RX ORDER — ONDANSETRON 4 MG/1
4 TABLET, ORALLY DISINTEGRATING ORAL EVERY 4 HOURS PRN
Status: DISCONTINUED | OUTPATIENT
Start: 2021-07-17 | End: 2021-07-27 | Stop reason: HOSPADM

## 2021-07-17 RX ORDER — GABAPENTIN 400 MG/1
400 CAPSULE ORAL 3 TIMES DAILY
Status: ON HOLD | COMMUNITY
End: 2021-07-26

## 2021-07-17 RX ORDER — BISACODYL 10 MG
10 SUPPOSITORY, RECTAL RECTAL
COMMUNITY

## 2021-07-17 RX ORDER — ENALAPRILAT 1.25 MG/ML
1.25 INJECTION INTRAVENOUS EVERY 6 HOURS PRN
Status: DISCONTINUED | OUTPATIENT
Start: 2021-07-17 | End: 2021-07-20

## 2021-07-17 RX ORDER — LIDOCAINE 50 MG/G
2 PATCH TOPICAL EVERY 12 HOURS
COMMUNITY

## 2021-07-17 RX ORDER — BACLOFEN 5 MG/1
10 TABLET ORAL 3 TIMES DAILY
Status: ON HOLD | COMMUNITY
End: 2021-07-27

## 2021-07-17 RX ORDER — MORPHINE SULFATE 4 MG/ML
4 INJECTION, SOLUTION INTRAMUSCULAR; INTRAVENOUS
Status: DISCONTINUED | OUTPATIENT
Start: 2021-07-17 | End: 2021-07-27 | Stop reason: HOSPADM

## 2021-07-17 RX ORDER — AMOXICILLIN 250 MG
2 CAPSULE ORAL 2 TIMES DAILY
COMMUNITY

## 2021-07-17 RX ORDER — OXYCODONE HYDROCHLORIDE 5 MG/1
2.5-5 TABLET ORAL EVERY 4 HOURS PRN
Status: ON HOLD | COMMUNITY
End: 2021-07-26 | Stop reason: SDUPTHER

## 2021-07-17 RX ORDER — HEPARIN SODIUM 1000 [USP'U]/ML
80 INJECTION, SOLUTION INTRAVENOUS; SUBCUTANEOUS ONCE
Status: DISCONTINUED | OUTPATIENT
Start: 2021-07-17 | End: 2021-07-17

## 2021-07-17 RX ORDER — POLYETHYLENE GLYCOL 3350 17 G/17G
1 POWDER, FOR SOLUTION ORAL
Status: DISCONTINUED | OUTPATIENT
Start: 2021-07-17 | End: 2021-07-27 | Stop reason: HOSPADM

## 2021-07-17 RX ORDER — LABETALOL HYDROCHLORIDE 5 MG/ML
10 INJECTION, SOLUTION INTRAVENOUS EVERY 4 HOURS PRN
Status: DISCONTINUED | OUTPATIENT
Start: 2021-07-17 | End: 2021-07-20

## 2021-07-17 RX ORDER — ONDANSETRON 2 MG/ML
4 INJECTION INTRAMUSCULAR; INTRAVENOUS EVERY 4 HOURS PRN
Status: DISCONTINUED | OUTPATIENT
Start: 2021-07-17 | End: 2021-07-20

## 2021-07-17 RX ORDER — AMOXICILLIN 250 MG
2 CAPSULE ORAL 2 TIMES DAILY
Status: DISCONTINUED | OUTPATIENT
Start: 2021-07-18 | End: 2021-07-27 | Stop reason: HOSPADM

## 2021-07-17 RX ORDER — TRAMADOL HYDROCHLORIDE 50 MG/1
50 TABLET ORAL EVERY 6 HOURS PRN
Status: DISCONTINUED | OUTPATIENT
Start: 2021-07-17 | End: 2021-07-27 | Stop reason: HOSPADM

## 2021-07-17 RX ORDER — BACLOFEN 10 MG/1
10 TABLET ORAL 3 TIMES DAILY
Status: DISCONTINUED | OUTPATIENT
Start: 2021-07-17 | End: 2021-07-19

## 2021-07-17 RX ORDER — OXYCODONE HYDROCHLORIDE 5 MG/1
5 TABLET ORAL EVERY 4 HOURS PRN
Status: DISCONTINUED | OUTPATIENT
Start: 2021-07-17 | End: 2021-07-17

## 2021-07-17 RX ORDER — HEPARIN SODIUM 1000 [USP'U]/ML
40 INJECTION, SOLUTION INTRAVENOUS; SUBCUTANEOUS PRN
Status: DISCONTINUED | OUTPATIENT
Start: 2021-07-17 | End: 2021-07-17

## 2021-07-17 RX ORDER — TRAMADOL HYDROCHLORIDE 50 MG/1
50 TABLET ORAL EVERY 6 HOURS PRN
Status: ON HOLD | COMMUNITY
End: 2021-07-27

## 2021-07-17 RX ORDER — SODIUM CHLORIDE 9 MG/ML
INJECTION, SOLUTION INTRAVENOUS CONTINUOUS
Status: DISCONTINUED | OUTPATIENT
Start: 2021-07-17 | End: 2021-07-19

## 2021-07-17 RX ORDER — GABAPENTIN 400 MG/1
400 CAPSULE ORAL 3 TIMES DAILY
Status: DISCONTINUED | OUTPATIENT
Start: 2021-07-17 | End: 2021-07-27 | Stop reason: HOSPADM

## 2021-07-17 RX ORDER — AMOXICILLIN 250 MG
2 CAPSULE ORAL 2 TIMES DAILY
Status: DISCONTINUED | OUTPATIENT
Start: 2021-07-17 | End: 2021-07-17

## 2021-07-17 RX ORDER — BISACODYL 10 MG
10 SUPPOSITORY, RECTAL RECTAL
Status: DISCONTINUED | OUTPATIENT
Start: 2021-07-17 | End: 2021-07-27 | Stop reason: HOSPADM

## 2021-07-17 RX ORDER — OXYCODONE HYDROCHLORIDE 10 MG/1
10 TABLET ORAL
Status: DISCONTINUED | OUTPATIENT
Start: 2021-07-17 | End: 2021-07-27 | Stop reason: HOSPADM

## 2021-07-17 RX ADMIN — OXYCODONE 5 MG: 5 TABLET ORAL at 06:01

## 2021-07-17 RX ADMIN — OXYCODONE 5 MG: 5 TABLET ORAL at 08:54

## 2021-07-17 RX ADMIN — GABAPENTIN 400 MG: 400 CAPSULE ORAL at 08:53

## 2021-07-17 RX ADMIN — ENOXAPARIN SODIUM 100 MG: 100 INJECTION SUBCUTANEOUS at 17:58

## 2021-07-17 RX ADMIN — OXYCODONE HYDROCHLORIDE 10 MG: 10 TABLET ORAL at 22:36

## 2021-07-17 RX ADMIN — ATENOLOL 50 MG: 25 TABLET ORAL at 05:55

## 2021-07-17 RX ADMIN — OXYCODONE 5 MG: 5 TABLET ORAL at 02:55

## 2021-07-17 RX ADMIN — GABAPENTIN 400 MG: 400 CAPSULE ORAL at 17:58

## 2021-07-17 RX ADMIN — SODIUM CHLORIDE: 9 INJECTION, SOLUTION INTRAVENOUS at 18:46

## 2021-07-17 RX ADMIN — BACLOFEN 5 MG: 10 TABLET ORAL at 08:53

## 2021-07-17 RX ADMIN — SENNOSIDES AND DOCUSATE SODIUM 2 TABLET: 8.6; 5 TABLET ORAL at 08:53

## 2021-07-17 RX ADMIN — LIDOCAINE 2 PATCH: 50 PATCH TOPICAL at 08:58

## 2021-07-17 RX ADMIN — IOHEXOL 80 ML: 350 INJECTION, SOLUTION INTRAVENOUS at 15:38

## 2021-07-17 RX ADMIN — OXYCODONE 5 MG: 5 TABLET ORAL at 19:33

## 2021-07-17 RX ADMIN — BACLOFEN 10 MG: 10 TABLET ORAL at 17:58

## 2021-07-17 RX ADMIN — OXYCODONE 5 MG: 5 TABLET ORAL at 13:59

## 2021-07-17 RX ADMIN — POLYETHYLENE GLYCOL 3350 1 PACKET: 17 POWDER, FOR SOLUTION ORAL at 05:55

## 2021-07-17 RX ADMIN — SODIUM CHLORIDE: 9 INJECTION, SOLUTION INTRAVENOUS at 17:17

## 2021-07-17 ASSESSMENT — COGNITIVE AND FUNCTIONAL STATUS - GENERAL
HELP NEEDED FOR BATHING: A LITTLE
SUGGESTED CMS G CODE MODIFIER MOBILITY: CK
MOVING TO AND FROM BED TO CHAIR: A LITTLE
DAILY ACTIVITIY SCORE: 17
TOILETING: A LITTLE
MOVING FROM LYING ON BACK TO SITTING ON SIDE OF FLAT BED: A LITTLE
CLIMB 3 TO 5 STEPS WITH RAILING: A LOT
EATING MEALS: A LITTLE
MOBILITY SCORE: 17
DRESSING REGULAR UPPER BODY CLOTHING: A LITTLE
TURNING FROM BACK TO SIDE WHILE IN FLAT BAD: A LITTLE
DRESSING REGULAR LOWER BODY CLOTHING: A LOT
STANDING UP FROM CHAIR USING ARMS: A LITTLE
SUGGESTED CMS G CODE MODIFIER DAILY ACTIVITY: CK
WALKING IN HOSPITAL ROOM: A LITTLE
PERSONAL GROOMING: A LITTLE

## 2021-07-17 ASSESSMENT — ENCOUNTER SYMPTOMS
BLURRED VISION: 0
FOCAL WEAKNESS: 0
SHORTNESS OF BREATH: 1
DIZZINESS: 0
BLURRED VISION: 0
SPEECH CHANGE: 0
WEAKNESS: 0
FLANK PAIN: 0
NAUSEA: 0
DOUBLE VISION: 0
NAUSEA: 0
DIAPHORESIS: 0
PALPITATIONS: 0
PALPITATIONS: 0
VOMITING: 0
MEMORY LOSS: 0
HEADACHES: 0
FEVER: 0
VOMITING: 0
CHILLS: 0
FEVER: 0
ABDOMINAL PAIN: 0
HEADACHES: 0
SENSORY CHANGE: 0
MYALGIAS: 0
DEPRESSION: 0
NERVOUS/ANXIOUS: 0
SHORTNESS OF BREATH: 0
BACK PAIN: 1
DIZZINESS: 0
HALLUCINATIONS: 0
COUGH: 0

## 2021-07-17 ASSESSMENT — PAIN DESCRIPTION - PAIN TYPE
TYPE: ACUTE PAIN

## 2021-07-17 ASSESSMENT — FIBROSIS 4 INDEX
FIB4 SCORE: 1.67
FIB4 SCORE: 2.17

## 2021-07-17 ASSESSMENT — ACTIVITIES OF DAILY LIVING (ADL)
BED_CHAIR_WHEELCHAIR_TRANSFER_DESCRIPTION: ADAPTIVE EQUIPMENT;INCREASED TIME;SUPERVISION FOR SAFETY;VERBAL CUEING
TOILETING_LEVEL_OF_ASSIST_DESCRIPTION: ASSIST FOR STANDING BALANCE;GRAB BAR;INCREASED TIME;SUPERVISION FOR SAFETY

## 2021-07-17 ASSESSMENT — GAIT ASSESSMENTS
DEVIATION: ANTALGIC;BRADYKINETIC;DECREASED HEEL STRIKE
ASSISTIVE DEVICE: 4 WHEEL WALKER
DISTANCE (FEET): 160
GAIT LEVEL OF ASSIST: STAND BY ASSIST

## 2021-07-17 NOTE — ED TRIAGE NOTES
Pt BIB EMS from [facility], per EMS pt had increasing SOB. Initially placed on 2L O2, with saturations in the low-mid 80s. Upon arrival on 5L O2 on a simple mask. Pt had non operative T8 fracture on (7/7). Pt Reports no chest pain, but pain in back near fracture site. AxO x4, VSS.

## 2021-07-17 NOTE — PROGRESS NOTES
[facility] Medicine Daily Progress Note    Date of Service  7/17/2021    Chief Complaint:  Hypertension  Hypoxia  Pneumonia    Interval History:  No significant events or changes since last visit    Review of Systems  Review of Systems   Constitutional: Negative for fever.   Eyes: Negative for blurred vision.   Respiratory: Negative for shortness of breath.    Cardiovascular: Negative for palpitations.   Gastrointestinal: Negative for nausea and vomiting.   Neurological: Negative for dizziness and headaches.   Psychiatric/Behavioral: Negative for hallucinations.        Physical Exam  Temp:  [36.2 °C (97.1 °F)-36.9 °C (98.5 °F)] 36.9 °C (98.5 °F)  Pulse:  [77-97] 87  Resp:  [16-20] 18  BP: (104-128)/(69-73) 122/73  SpO2:  [80 %-94 %] 94 %    Physical Exam  Vitals and nursing note reviewed.   Constitutional:       General: She is not in acute distress.  HENT:      Mouth/Throat:      Mouth: Mucous membranes are moist.      Pharynx: Oropharynx is clear.   Eyes:      General: No scleral icterus.  Neck:      Vascular: No JVD.   Cardiovascular:      Rate and Rhythm: Normal rate and regular rhythm.      Heart sounds: Normal heart sounds.   Pulmonary:      Effort: Pulmonary effort is normal.      Breath sounds: No wheezing or rales.   Abdominal:      General: Bowel sounds are normal.      Palpations: Abdomen is soft.   Musculoskeletal:      Cervical back: No rigidity.      Right lower leg: No edema.      Left lower leg: No edema.   Skin:     General: Skin is warm and dry.   Neurological:      Mental Status: She is alert and oriented to person, place, and time.   Psychiatric:         Mood and Affect: Mood normal.         Behavior: Behavior normal.         Fluids    Intake/Output Summary (Last 24 hours) at 7/17/2021 0840  Last data filed at 7/16/2021 2200  Gross per 24 hour   Intake 740 ml   Output --   Net 740 ml       Laboratory  Recent Labs     07/16/21  0537   WBC 5.4   RBC 3.76*   HEMOGLOBIN 10.1*   HEMATOCRIT 33.2*   MCV  "88.3   MCH 26.9*   MCHC 30.4*   RDW 51.0*   PLATELETCT 199   MPV 10.6     Recent Labs     07/16/21  0537   SODIUM 136   POTASSIUM 4.1   CHLORIDE 98   CO2 25   GLUCOSE 88   BUN 21   CREATININE 0.75   CALCIUM 9.6                   Imaging    Assessment/Plan  Hypoxia  Assessment & Plan  Denies chest pain, palpitations, or calf pain  On O2 supplementation  Covid: negative 7/9/21  CXR (7/10): Ill-defined right perihilar opacity could represent atelectasis versus pneumonitis  CXR (7/12): unchanged  S/P Zosyn  S/P Omnicef (7/14)  2nd to pneumonia and/or having had \"shallow breathing from pain\"  Encouraging IS  Will check CXR    Trauma  Assessment & Plan  S/P mechanical GLF -- fell out of camper onto back  Has T7 spinous process fx and T8 wedge fx  Non-surgical management per Neurosurgery  TLSO when OOB    Hypokalemia  Assessment & Plan  K+: 3.4 --> 4.1 (7/16)  S/P supplements x 3 days  Monitor    Anemia- (present on admission)  Assessment & Plan  H&H stable (7/16)  Fe: 52, sats 18%  B12: 1016  Monitor    HTN (hypertension)- (present on admission)  Assessment & Plan  BP ok  On Atenolol  Monitor    "

## 2021-07-17 NOTE — ED PROVIDER NOTES
ED Provider Note  This patient was cared for during the COVID-19 pandemic. History and physical exam may be limited/truncated by the inherent challenges of PPE and the need to decrease staff exposure to novel coronavirus. Some aspects of disease management may be different to protect staff and help slow the spread of disease. I verified that, if possible, the patient was wearing a mask and I was wearing appropriate PPE every time I encountered the patient.     Scribed for Melanie Lara M.D. by Katie Iglesias. 7/17/2021, 12:05 PM.    Primary care provider: No primary care provider noted  Means of arrival: EMS  History obtained from: Patient  History limited by: None    CHIEF COMPLAINT  Chief Complaint   Patient presents with   • Shortness of Breath   • Back Pain       HPI  Peg Louie is a 76 y.o. female who presents to the Emergency Department for evaluation of shortness of breath onset several days ago. Patient states she cannot breathe through her nose.  She admits to associated symptoms of dryness in mouth, muscle aching, back pain, dyspnea with exertion, labored breathing, and coughing when unable to swallow, but denies leg pain, fever, chest pain, chills, or pain with a deep breath. No alleviating factors were reported.       REVIEW OF SYSTEMS  Pertinent positives include dry mouth, muscle aches, back pain, dyspnea on exertion. Pertinent negatives include no leg pain, fever, chest pain, chills, or pain with deep breaths.  All other systems reviewed and negative.    PAST MEDICAL HISTORY   has a past medical history of Irregular heart beat.    SURGICAL HISTORY   has a past surgical history that includes primary c section; cataract extraction with iol; and other orthopedic surgery.    SOCIAL HISTORY  Social History     Tobacco Use   • Smoking status: Never Smoker   • Smokeless tobacco: Never Used   Vaping Use   • Vaping Use: Never used   Substance Use Topics   • Alcohol use: Yes     Comment: 0nce  "or twice a week   • Drug use: Never      Social History     Substance and Sexual Activity   Drug Use Never       FAMILY HISTORY  No family history on file.    CURRENT MEDICATIONS  Home Medications    **Home medications have not yet been reviewed for this encounter**         ALLERGIES  Allergies   Allergen Reactions   • Gluten Meal        PHYSICAL EXAM  VITAL SIGNS: /70   Pulse 100   Temp 37.1 °C (98.7 °F) (Temporal)   Resp 20   Ht 1.676 m (5' 6\")   Wt 107 kg (235 lb)   SpO2 97%   BMI 37.93 kg/m²   Constitutional: Alert in mild respiratory distress.  HENT: No signs of trauma, Bilateral external ears normal, Nose normal.   Eyes: Pupils are equal and reactive, Conjunctiva normal, Non-icteric.   Neck: Normal range of motion, No tenderness, Supple, No stridor.   Cardiovascular: Regular rate and rhythm, no murmurs.   Thorax & Lungs: Slight shortness of breath, No wheezing, No chest tenderness.   Abdomen: Bowel sounds normal, Soft, No tenderness, No masses, No peritoneal signs.  Skin: Warm, Dry, No erythema, No rash.   Musculoskeletal:  No major deformities noted.   Neurologic: Alert, moving all extremities without difficulty, no focal deficits.    LABS  Labs Reviewed   CBC WITH DIFFERENTIAL - Abnormal; Notable for the following components:       Result Value    RBC 3.95 (*)     Hemoglobin 10.6 (*)     Hematocrit 34.5 (*)     MCH 26.8 (*)     MCHC 30.7 (*)     RDW 50.3 (*)     Neutrophils-Polys 77.20 (*)     Lymphocytes 14.50 (*)     All other components within normal limits   COMP METABOLIC PANEL - Abnormal; Notable for the following components:    Glucose 109 (*)     Alkaline Phosphatase 106 (*)     All other components within normal limits   TROPONIN - Abnormal; Notable for the following components:    Troponin T 74 (*)     All other components within normal limits   ESTIMATED GFR   COV-2, FLU A/B, AND RSV BY PCR   APTT   PROTHROMBIN TIME   HEPARIN XA (UNFRACTIONATED)     All labs reviewed by me.    EKG  12 " Lead EKG interpreted by me    RADIOLOGY  CT-CTA CHEST PULMONARY ARTERY W/ RECONS   Final Result      1.  Multiple bilateral lobar and segmental pulmonary emboli. RV/LV ratio is elevated at 1.3 consistent with right heart strain.      2.  There is a patchy atelectasis and bilateral pleural effusions.      3.  Indeterminate 4 mm pulmonary nodule within the right upper lobe inferiorly.      4.  Areas of scarring and atelectasis within the lungs bilaterally.      5.  This was discussed with JUAN MADSEN at 3:45 PM on 7/17/2021.      DX-CHEST-PORTABLE (1 VIEW)   Final Result      Enlarged cardiac silhouette with bibasilar atelectasis.        The radiologist's interpretation of all radiological studies have been reviewed by me.    COURSE & MEDICAL DECISION MAKING  Pertinent Labs & Imaging studies reviewed. (See chart for details)    Patient's past medical records were reviewed and indicated that the patient was admitted at the beginning of this month for a ground-level fall resulting in a T-8 fracture and rehab since.     Differential diagnoses include but are not limited to: PE, pneumonia    12:05 PM - Patient seen and examined at bedside. Ordered DX-Chest, CBC with diff, CMP, and EKG to evaluate her symptoms.     1:52 PM Ordered oxycodone immediate release 5 mg PO to treat patient.    3:50 PM - Paged Hospitalist.    3:52 PM I discussed the patient's case and the above findings with Dr. Lane (Hospitalist) who agrees to evaluate the patient.    4:02 PM -  Patient was reevaluated at bedside. Discussed lab and radiology results with the patient and informed them of pulmonary emboli. I informed the patient of plan for admission.     Decision Making:  This is a 76 y.o. year old female who presents with new onset hypoxia.  Patient has had a recent complicated hospital course with the thoracic spine fracture and is recently been in rehab.  Patient does have a new oxygen requirement and is slightly tachypneic.  CT was  performed and there are multiple segmental PEs.  Patient will be started on anticoagulation.  She will need hospitalization for her new PEs and further evaluation.    DISPOSITION:  Patient will be hospitalized by Dr. Lane in guarded condition.      FINAL IMPRESSION  1. Multiple subsegmental pulmonary emboli without acute cor pulmonale (HCC)               This dictation has been created using voice recognition software and/or scribes. The accuracy of the dictation is limited by the abilities of the software and the expertise of the scribes. I expect there may be some errors of grammar and possibly content. I made every attempt to manually correct the errors within my dictation. However, errors related to voice recognition software and/or scribes may still exist and should be interpreted within the appropriate context.     I, Katie Iglesias (Scribe), am scribing for, and in the presence of, Melanie Lara M.D..    Electronically signed by: Katie Iglesias (Scribe), 7/17/2021    IMelanie M.D. personally performed the services described in this documentation, as scribed by Katie Iglesias in my presence, and it is both accurate and complete. C    The note accurately reflects work and decisions made by me.  Melanie Lara M.D.  7/17/2021  7:15 PM

## 2021-07-17 NOTE — CARE PLAN
The patient is Stable - Low risk of patient condition declining or worsening    Shift Goals  Patient Goals: pain control    Medicated with Oxycodone X 3, patient would like to request MD to increase her  Oxycodone to 7.5 at night . She states she will mention it to her MD today. Will endorsed to day shift RN. Sleeping comfortably during rounds, OOB to BR with standby assistance.   Problem: Knowledge Deficit - Standard  Goal: Patient and family/care givers will demonstrate understanding of plan of care, disease process/condition, diagnostic tests and medications  Outcome: Progressing     Problem: Risk for Autonomic Dysreflexia  Goal: Patient will show no signs and symptoms of autonomic dysreflexia  Outcome: Progressing     Problem: Infection  Goal: Patient will remain free from infection  Outcome: Progressing     Problem: Skin Integrity  Goal: Patient's skin integrity will be maintained or improve  Outcome: Progressing    Last BM 7/14. Given Miralax this morning.

## 2021-07-17 NOTE — ED NOTES
Med Rec completed: per pt at bedside, and review of inpatient rehab MAR.     Pt reports that atenolol is used to regulate heartbeat, not for hypertension.    Pt reports being DC'ed on IV and subcutaneous heparin orders as of two days ago, 7/15/2021.    No ORAL antibiotics in last 30 days    Preferred Pharmacy: [facility] P: [phone]    Pt confirmed following allergies:  Allergies   Allergen Reactions   • Gluten Meal       Pt's home medications:   Medication Sig   • baclofen (LIORESAL) 5 MG Tab Take 10 mg by mouth 3 times a day.   • gabapentin (NEURONTIN) 400 MG Cap Take 400 mg by mouth 3 times a day.   • lidocaine (LIDODERM) 5 % Patch Place 2 Patches on the skin every 12 hours.   • oxyCODONE immediate-release (ROXICODONE) 5 MG Tab Take 2.5-5 mg by mouth every four hours as needed for Severe Pain.   • senna-docusate (PERICOLACE OR SENOKOT S) 8.6-50 MG Tab Take 2 Tablets by mouth 2 times a day.   • magnesium hydroxide (MILK OF MAGNESIA) 400 MG/5ML Suspension Take 30 mL by mouth 1 time a day as needed (If PEG and docusate ineffective after 24 hours).   • bisacodyl (DULCOLAX) 10 MG Suppos Insert 10 mg into the rectum 1 time a day as needed (If mag hydroxide is ineffective after 24 hours).   • traMADol (ULTRAM) 50 MG Tab Take 50 mg by mouth every 6 hours as needed (Pain scale 4-6).   • polyethylene glycol/lytes (MIRALAX) 17 g Pack Take 1 Packet by mouth 1 time a day as needed (if sennosides and docusate ineffective after 24 hours).   • atenolol (TENORMIN) 50 MG Tab Take 50 mg by mouth every day. Used for irregular heartbeat     Removed medications:   Medication Removal Reason   • [DISCONTINUED] gabapentin (NEURONTIN) 100 MG Cap Reconciled dosing info, dose changed to 400mg caps   • [DISCONTINUED] CALCIUM PO Pt reports not taking     • [DISCONTINUED] vitamin e (VITAMIN E) 400 UNIT Cap Pt reports not taking     • [DISCONTINUED] B Complex Vitamins (VITAMIN B COMPLEX PO) Pt reports not taking     • [DISCONTINUED]  Ascorbic Acid (VITAMIN C PO) Pt reports not taking     • [DISCONTINUED] therapeutic multivitamin-minerals (THERAGRAN-M) Tab Pt reports not taking     • [DISCONTINUED] fluticasone (FLONASE) 50 MCG/ACT nasal spray Pt reports not taking     • [DISCONTINUED] methocarbamol (ROBAXIN) 750 MG Tab Pt reports not taking

## 2021-07-17 NOTE — PROGRESS NOTES
"Rehab Progress Note     Date of Service: 7/17/2021  Chief Complaint: shortness of breath    Interval Events (Subjective)    Patient seen and examined this morning in her room.  She is complaining of shortness of breath, has increased oxygen needs, as well as tachycardia.  Discussed with hospitalist, plan to discharge to ED to evaluate for PE.          Objective:  VITAL SIGNS: /73   Pulse (!) 105   Temp 36.9 °C (98.5 °F) (Temporal)   Resp 18   Ht 1.676 m (5' 6\")   Wt 107 kg (235 lb 14.3 oz)   SpO2 92%   BMI 38.07 kg/m²   Gen: alert, no apparent distress  Resp: tachypnea, on 4 L O2  CV: tachycardic, regular rhythm      Recent Results (from the past 72 hour(s))   CBC WITHOUT DIFFERENTIAL    Collection Time: 07/16/21  5:37 AM   Result Value Ref Range    WBC 5.4 4.8 - 10.8 K/uL    RBC 3.76 (L) 4.20 - 5.40 M/uL    Hemoglobin 10.1 (L) 12.0 - 16.0 g/dL    Hematocrit 33.2 (L) 37.0 - 47.0 %    MCV 88.3 81.4 - 97.8 fL    MCH 26.9 (L) 27.0 - 33.0 pg    MCHC 30.4 (L) 33.6 - 35.0 g/dL    RDW 51.0 (H) 35.9 - 50.0 fL    Platelet Count 199 164 - 446 K/uL    MPV 10.6 9.0 - 12.9 fL   RETICULOCYTES COUNT    Collection Time: 07/16/21  5:37 AM   Result Value Ref Range    Reticulocyte Count 2.5 (H) 0.8 - 2.1 %    Retic, Absolute 0.10 (H) 0.04 - 0.06 M/uL    Imm. Reticulocyte Fraction 35.5 (H) 9.3 - 17.4 %    Retic Hgb Equivalent 30.9 29.0 - 35.0 pg/cell   IRON/TOTAL IRON BIND    Collection Time: 07/16/21  5:37 AM   Result Value Ref Range    Iron 52 40 - 170 ug/dL    Total Iron Binding 282 250 - 450 ug/dL    Unsat Iron Binding 230 110 - 370 ug/dL    % Saturation 18 15 - 55 %   Basic Metabolic Panel    Collection Time: 07/16/21  5:37 AM   Result Value Ref Range    Sodium 136 135 - 145 mmol/L    Potassium 4.1 3.6 - 5.5 mmol/L    Chloride 98 96 - 112 mmol/L    Co2 25 20 - 33 mmol/L    Glucose 88 65 - 99 mg/dL    Bun 21 8 - 22 mg/dL    Creatinine 0.75 0.50 - 1.40 mg/dL    Calcium 9.6 8.5 - 10.5 mg/dL    Anion Gap 13.0 7.0 - 16.0 "   FERRITIN    Collection Time: 21  5:37 AM   Result Value Ref Range    Ferritin 161.0 10.0 - 291.0 ng/mL   VITAMIN B12    Collection Time: 21  5:37 AM   Result Value Ref Range    Vitamin B12 -True Cobalamin 1016 (H) 211 - 911 pg/mL   ESTIMATED GFR    Collection Time: 21  5:37 AM   Result Value Ref Range    GFR If African American >60 >60 mL/min/1.73 m 2    GFR If Non African American >60 >60 mL/min/1.73 m 2   EKG    Collection Time: 21 12:00 PM   Result Value Ref Range    Report       [facility] Emergency Dept.    Test Date:  2021  Pt Name:    MATA GUZMAN                  Department: ER  MRN:        [id number]                      Room:       [facility]  Gender:     Female                       Technician: [id number]  :        [id number]                   Requested By:ER TRIAGE PROTOCOL  Order #:    [id number]                    Reading MD:    Measurements  Intervals                                Axis  Rate:       96                           P:          43  OR:         176                          QRS:        -13  QRSD:       98                           T:          -15  QT:         360  QTc:        455    Interpretive Statements  SINUS RHYTHM  EARLY PRECORDIAL R/S TRANSITION  NONSPECIFIC REPOL ABNORMALITY, DIFFUSE LEADS  No previous ECG available for comparison     CBC with Differential    Collection Time: 21 12:21 PM   Result Value Ref Range    WBC 7.0 4.8 - 10.8 K/uL    RBC 3.95 (L) 4.20 - 5.40 M/uL    Hemoglobin 10.6 (L) 12.0 - 16.0 g/dL    Hematocrit 34.5 (L) 37.0 - 47.0 %    MCV 87.3 81.4 - 97.8 fL    MCH 26.8 (L) 27.0 - 33.0 pg    MCHC 30.7 (L) 33.6 - 35.0 g/dL    RDW 50.3 (H) 35.9 - 50.0 fL    Platelet Count 200 164 - 446 K/uL    MPV 10.0 9.0 - 12.9 fL    Neutrophils-Polys 77.20 (H) 44.00 - 72.00 %    Lymphocytes 14.50 (L) 22.00 - 41.00 %    Monocytes 6.80 0.00 - 13.40 %    Eosinophils 0.30 0.00 - 6.90 %    Basophils 0.60 0.00 - 1.80 %    Immature Granulocytes  0.60 0.00 - 0.90 %    Nucleated RBC 0.00 /100 WBC    Neutrophils (Absolute) 5.42 2.00 - 7.15 K/uL    Lymphs (Absolute) 1.02 1.00 - 4.80 K/uL    Monos (Absolute) 0.48 0.00 - 0.85 K/uL    Eos (Absolute) 0.02 0.00 - 0.51 K/uL    Baso (Absolute) 0.04 0.00 - 0.12 K/uL    Immature Granulocytes (abs) 0.04 0.00 - 0.11 K/uL    NRBC (Absolute) 0.00 K/uL       Current Facility-Administered Medications   Medication Frequency   • [MAR Hold - Suspended Admission] baclofen (LIORESAL) tablet 5 mg TID   • [MAR Hold - Suspended Admission] gabapentin (NEURONTIN) capsule 400 mg TID   • [MAR Hold - Suspended Admission] oxyCODONE immediate-release (ROXICODONE) tablet 2.5 mg Q3HRS PRN    Or   • [MAR Hold - Suspended Admission] oxyCODONE immediate-release (ROXICODONE) tablet 5 mg Q3HRS PRN   • [MAR Hold - Suspended Admission] lidocaine (LIDODERM) 5 % 2 Patch DAILY   • [MAR Hold - Suspended Admission] atenolol (TENORMIN) tablet 50 mg Daily-0600   • [MAR Hold - Suspended Admission] Respiratory Therapy Consult Continuous RT   • [MAR Hold - Suspended Admission] hydrALAZINE (APRESOLINE) tablet 25 mg Q8HRS PRN   • [MAR Hold - Suspended Admission] acetaminophen (Tylenol) tablet 650 mg Q4HRS PRN   • [MAR Hold - Suspended Admission] senna-docusate (PERICOLACE or SENOKOT S) 8.6-50 MG per tablet 2 tablet BID    And   • [MAR Hold - Suspended Admission] polyethylene glycol/lytes (MIRALAX) PACKET 1 Packet QDAY PRN    And   • [MAR Hold - Suspended Admission] magnesium hydroxide (MILK OF MAGNESIA) suspension 30 mL QDAY PRN    And   • [MAR Hold - Suspended Admission] bisacodyl (DULCOLAX) suppository 10 mg QDAY PRN   • [MAR Hold - Suspended Admission] artificial tears ophthalmic solution 1 Drop PRN   • [MAR Hold - Suspended Admission] benzocaine-menthol (CEPACOL) lozenge 1 Lozenge Q2HRS PRN   • [MAR Hold - Suspended Admission] mag hydrox-al hydrox-simeth (MAALOX PLUS ES or MYLANTA DS) suspension 20 mL Q2HRS PRN   • [MAR Hold - Suspended Admission]  ondansetron (ZOFRAN ODT) dispertab 4 mg 4X/DAY PRN    Or   • [MAR Hold - Suspended Admission] ondansetron (ZOFRAN) syringe/vial injection 4 mg 4X/DAY PRN   • [MAR Hold - Suspended Admission] traZODone (DESYREL) tablet 50 mg QHS PRN   • [MAR Hold - Suspended Admission] sodium chloride (OCEAN) 0.65 % nasal spray 2 Spray PRN   • [MAR Hold - Suspended Admission] diazePAM (VALIUM) tablet 5 mg Q6HRS PRN   • [MAR Hold - Suspended Admission] traMADol (ULTRAM) 50 MG tablet 50 mg Q6HRS PRN     Current Outpatient Medications   Medication   • polyethylene glycol/lytes (MIRALAX) 17 g Pack   • methocarbamol (ROBAXIN) 750 MG Tab   • gabapentin (NEURONTIN) 100 MG Cap   • atenolol (TENORMIN) 50 MG Tab   • fluticasone (FLONASE) 50 MCG/ACT nasal spray   • therapeutic multivitamin-minerals (THERAGRAN-M) Tab   • Ascorbic Acid (VITAMIN C PO)   • B Complex Vitamins (VITAMIN B COMPLEX PO)   • vitamin e (VITAMIN E) 400 UNIT Cap   • CALCIUM PO       Orders Placed This Encounter   Procedures   • Diet Order Diet: Regular     Standing Status:   Standing     Number of Occurrences:   1     Order Specific Question:   Diet:     Answer:   Regular [1]       Assessment:  Active Hospital Problems    Diagnosis    • *Spinal fracture of T8 vertebra (HCC)    • Hypokalemia    • Trauma    • Hypoxia    • Thrombocytopenia (HCC)    • Anemia    • Debility    • HTN (hypertension)    • Obesity    • Hyponatremia        Medical Decision Making and Plan:    Hypoxia  Tachycardia    Patient sent to ED for further evaluation of her symptoms, work up for possible PE.    Hospitalist Dr. Galan gave verbal report to ED physician.    Dr. Penn to evaluate case for potential return to rehab once she is medically cleared.    Total time:  25 minutes.  I spent greater than 50% of the time for patient care, counseling, and coordination on this date, including patient face-to face time, unit/floor time with review of records/pertinent lab data and studies, as well as discussing  diagnostic evaluation/work up, planned therapeutic interventions, and future disposition of care, as per the interval events/subjective and the assessment and plan as noted above.        Mildred Duarte M.D.    Physical Medicine and Rehabilitation

## 2021-07-17 NOTE — FLOWSHEET NOTE
07/17/21 1117   Events/Summary/Plan   Events/Summary/Plan SpO2 check   Vital Signs   Pulse (!) 105   Respiration 18   Pulse Oximetry 92 %   $ Pulse Oximetry (Spot Check) Yes   Oxygen   O2 (LPM) 4   O2 Delivery Device Oxymask

## 2021-07-17 NOTE — ASSESSMENT & PLAN NOTE
Multiple bilateral lobar and segmental embolism = submassive pulmonary embolism. Likely related to debility secondary to T7-T8 fracture. No family history of blood clots, no history of blood clots or hormone replacement therapy. Completed apixaban 10 mg bid for 5 days. Shortness of breath improving. Now on 1L of NC.  Plan:  - On apixaban 5 mg BID for minimum of 3-6 months s/p PE.  - O2 as needed, continue trying to ween down, keep SpO2 >92%.  Currently on 1 L  - Continue using incentive spirometer hourly.  - Pending concentrator.  Once received will coordinate discharge.

## 2021-07-17 NOTE — ASSESSMENT & PLAN NOTE
Secondary to fall, recent fracture on 07/05/2021. Did not meet criteria to go back to physical rehab. Will need to schedule outpatient rehab when back in [place].  - On baclofen, gabapentin and PRN oxycodone.   Will discontinue baclofen outpatient.  Will give Flexeril as needed instead. Will give 20 tablets of oxycodone 5mg Q6H PRN. Patient to see PCP for further pain medication refills.

## 2021-07-17 NOTE — H&P
Hospital Medicine History & Physical Note    Date of Service  7/17/2021    Primary Care Physician  Pcp Pt States None    Consultants  None    Code Status  Full Code    Chief Complaint  Chief Complaint   Patient presents with   • Shortness of Breath   • Back Pain       History of Presenting Illness  Peg Louie is a 76 y.o. female who presented 7/17/2021 with hypoxia and shortness of breath.  Patient was transferred from acute rehab center secondary to recent fall with spinal thoracic fracture x1 week ago.  Patient was noted to be hypoxic at outlying facility and transferred to [facility] for evaluation.  In the emergency room CTA was positive for bilateral pulmonary embolism with right heart strain.  Patient is now requiring 5 L oxygen supplementation.    Patient denies any chest pain, fevers chills, cough, nausea or vomiting.    Patient does report associated back pain with known history of thoracic fracture.    I discussed the plan of care with patient and family.    Review of Systems  Review of Systems   Constitutional: Negative for chills, diaphoresis, fever and malaise/fatigue.   HENT: Negative for congestion and hearing loss.    Eyes: Negative for blurred vision and double vision.   Respiratory: Positive for shortness of breath. Negative for cough.    Cardiovascular: Negative for chest pain, palpitations and leg swelling.   Gastrointestinal: Negative for abdominal pain, nausea and vomiting.   Genitourinary: Negative for dysuria and flank pain.   Musculoskeletal: Positive for back pain. Negative for joint pain and myalgias.   Neurological: Negative for dizziness, sensory change, speech change, focal weakness, weakness and headaches.   Psychiatric/Behavioral: Negative for depression and memory loss. The patient is not nervous/anxious.        Past Medical History   has a past medical history of Irregular heart beat.    Surgical History   has a past surgical history that includes primary c section; cataract  extraction with iol; and other orthopedic surgery.     Family History  family history is not on file.   Family history reviewed with patient. There is no family history that is pertinent to the chief complaint.     Social History   reports that she has never smoked. She has never used smokeless tobacco. She reports current alcohol use. She reports that she does not use drugs.    Allergies  Allergies   Allergen Reactions   • Gluten Meal        Medications  Prior to Admission Medications   Prescriptions Last Dose Informant Patient Reported? Taking?   atenolol (TENORMIN) 50 MG Tab 7/17/2021 at 0555 Patient Yes No   Sig: Take 50 mg by mouth every day. Used for irregular heartbeat   baclofen (LIORESAL) 5 MG Tab 7/17/2021 at 0853  Yes Yes   Sig: Take 10 mg by mouth 3 times a day.   bisacodyl (DULCOLAX) 10 MG Suppos 7/14/2021 at 1658  Yes Yes   Sig: Insert 10 mg into the rectum 1 time a day as needed (If mag hydroxide is ineffective after 24 hours).   gabapentin (NEURONTIN) 400 MG Cap 7/17/2021 at 0853  Yes Yes   Sig: Take 400 mg by mouth 3 times a day.   lidocaine (LIDODERM) 5 % Patch 7/17/2021 at 0858  Yes Yes   Sig: Place 2 Patches on the skin every 12 hours.   magnesium hydroxide (MILK OF MAGNESIA) 400 MG/5ML Suspension 7/14/2021 at 0518  Yes Yes   Sig: Take 30 mL by mouth 1 time a day as needed (If PEG and docusate ineffective after 24 hours).   oxyCODONE immediate-release (ROXICODONE) 5 MG Tab 7/17/2021 at 1359  Yes Yes   Sig: Take 2.5-5 mg by mouth every four hours as needed for Severe Pain.   polyethylene glycol/lytes (MIRALAX) 17 g Pack 7/17/2021 at 0555  No No   Sig: Take 1 Packet by mouth 1 time a day as needed (if sennosides and docusate ineffective after 24 hours).   senna-docusate (PERICOLACE OR SENOKOT S) 8.6-50 MG Tab 7/17/2021 at 0853  Yes Yes   Sig: Take 2 Tablets by mouth 2 times a day.   traMADol (ULTRAM) 50 MG Tab 7/10/2021 at 1221  Yes Yes   Sig: Take 50 mg by mouth every 6 hours as needed (Pain scale  4-6).      Facility-Administered Medications: None       Physical Exam  Temp:  [37.1 °C (98.7 °F)] 37.1 °C (98.7 °F)  Pulse:  [] 78  Resp:  [19-23] 20  BP: (104-134)/(52-84) 115/60  SpO2:  [93 %-98 %] 97 %    Physical Exam  Vitals and nursing note reviewed.   Constitutional:       General: She is not in acute distress.     Appearance: She is not ill-appearing, toxic-appearing or diaphoretic.   HENT:      Head: Normocephalic and atraumatic.      Nose: Nose normal.   Eyes:      General:         Right eye: No discharge.         Left eye: No discharge.      Pupils: Pupils are equal, round, and reactive to light.   Neck:      Thyroid: No thyromegaly.      Vascular: No JVD.   Cardiovascular:      Rate and Rhythm: Normal rate.      Heart sounds: No murmur heard.     Pulmonary:      Effort: No respiratory distress.      Breath sounds: Normal breath sounds. No wheezing.   Abdominal:      General: Bowel sounds are normal. There is no distension.      Palpations: Abdomen is soft.      Tenderness: There is no abdominal tenderness.   Musculoskeletal:         General: No swelling or tenderness.      Cervical back: Neck supple.      Right lower leg: No edema.      Left lower leg: No edema.   Skin:     General: Skin is warm and dry.      Findings: No erythema or rash.   Neurological:      Mental Status: She is alert and oriented to person, place, and time.      Cranial Nerves: No cranial nerve deficit.      Sensory: No sensory deficit.      Motor: Weakness present.   Psychiatric:         Behavior: Behavior normal.         Thought Content: Thought content normal.         Laboratory:  Recent Labs     07/16/21  0537 07/17/21  1221   WBC 5.4 7.0   RBC 3.76* 3.95*   HEMOGLOBIN 10.1* 10.6*   HEMATOCRIT 33.2* 34.5*   MCV 88.3 87.3   MCH 26.9* 26.8*   MCHC 30.4* 30.7*   RDW 51.0* 50.3*   PLATELETCT 199 200   MPV 10.6 10.0     Recent Labs     07/16/21  0537 07/17/21  1221   SODIUM 136 136   POTASSIUM 4.1 4.7   CHLORIDE 98 98   CO2 25  26   GLUCOSE 88 109*   BUN 21 17   CREATININE 0.75 0.80   CALCIUM 9.6 9.3     Recent Labs     07/16/21  0537 07/17/21  1221   ALTSGPT  --  26   ASTSGOT  --  23   ALKPHOSPHAT  --  106*   TBILIRUBIN  --  0.4   GLUCOSE 88 109*     Recent Labs     07/17/21  1634   APTT 22.5*   INR 1.14*     No results for input(s): NTPROBNP in the last 72 hours.      Recent Labs     07/17/21  1221 07/17/21  1634   TROPONINT 74* 76*       Imaging:  CT-CTA CHEST PULMONARY ARTERY W/ RECONS   Final Result      1.  Multiple bilateral lobar and segmental pulmonary emboli. RV/LV ratio is elevated at 1.3 consistent with right heart strain.      2.  There is a patchy atelectasis and bilateral pleural effusions.      3.  Indeterminate 4 mm pulmonary nodule within the right upper lobe inferiorly.      4.  Areas of scarring and atelectasis within the lungs bilaterally.      5.  This was discussed with JUAN MADSEN at 3:45 PM on 7/17/2021.      DX-CHEST-PORTABLE (1 VIEW)   Final Result      Enlarged cardiac silhouette with bibasilar atelectasis.      EC-ECHOCARDIOGRAM COMPLETE W/ CONT    (Results Pending)   US-EXTREMITY VENOUS LOWER BILAT    (Results Pending)       X-Ray:  I have personally reviewed the images and compared with prior images.  EKG:  I have personally reviewed the images and compared with prior images.    Assessment/Plan:  I anticipate this patient will require at least two midnights for appropriate medical management, necessitating inpatient admission.    * Acute pulmonary embolism (HCC)  Assessment & Plan  Admit to telemetry inpatient unit  Started on heparin drip, continue  Transition to oral anticoagulation on discharge  Follow-up troponin  Follow-up echocardiogram  Follow-up lower extremity Dopplers  Secondary to recent thoracic fracture with debility  Continue oxygen supplementation as needed  RT protocol  Pulse oximetry  Pain control    Debility- (present on admission)  Assessment & Plan  From acute rehab  Will need to return  to rehab once stabilized    Anemia- (present on admission)  Assessment & Plan  At baseline    Obesity- (present on admission)  Assessment & Plan  .    HTN (hypertension)- (present on admission)  Assessment & Plan  Controlled, continue home medication    Spinal fracture of T8 vertebra (HCC)- (present on admission)  Assessment & Plan  Secondary to fall, recent fracture  Transferred from acute rehab center    Spinal fracture of T7 vertebra (HCC)- (present on admission)  Assessment & Plan  Secondary to fall, recent fracture      VTE prophylaxis: therapeutic anticoagulation with Heparin

## 2021-07-17 NOTE — ASSESSMENT & PLAN NOTE
PMR recommended home with outpatient rehab vs. Skilled nursing facility. Per report, daughter and patient refused skilled nursing facility. Will need to schedule outpatient rehab with PCP when back in [place].  - DME 4 wheel walker with seat ordered.

## 2021-07-17 NOTE — THERAPY
Missed Therapy     Patient Name: Peg Louie  Age:  76 y.o., Sex:  female  Medical Record #: [id number]  Today's Date: 7/17/2021    Discussed missed therapy with nursing. Patient transferred to acute due to increased SOB and oxygen desaturation.

## 2021-07-17 NOTE — THERAPY
"Occupational Therapy  Daily Treatment     Patient Name: Peg Louie  Age:  76 y.o., Sex:  female  Medical Record #: [id number]  Today's Date: 7/17/2021     Precautions  Precautions: Fall Risk, TLSO (Thoracolumbosacral orthosis), Spinal / Back Precautions   Comments: TLSO EOB, 2L O2         Subjective    \"I just can't do therapy, I can't do anything but lay down\"- pt said in tears     Objective       07/17/21 1031   Vitals   Pulse (!) 102   Pulse Oximetry (!) 86 %   O2 (LPM) 4   O2 Delivery Device Oxymask   Vitals Comments laying in bed after ambulating back from bathroom   Functional Level of Assist   Toileting Minimal Assist   Toileting Description Assist for standing balance;Grab bar;Increased time;Supervision for safety   Toilet Transfers Contact Guard Assist   Toilet Transfer Description Grab bar;Supervision for safety;Increased time;Initial preparation for task   Interdisciplinary Plan of Care Collaboration   IDT Collaboration with  Family / Caregiver;Physical Therapist;Physician;Respiratory Therapist   Patient Position at End of Therapy In Bed;Phone within Reach;Tray Table within Reach;Call Light within Reach;Family / Friend in Room   Collaboration Comments CLOF   Therapy Missed   Missed Therapy (Minutes) 30   Reason For Missed Therapy Medical - Patient on Hold from Therapy  (pt decreased O2 stats, sent to regional for CT)   OT Total Time Spent   OT Individual Total Time Spent (Mins) 30   OT Charge Group   OT Self Care / ADL 2       Assessment    Pt tolerated session poorly due to decreased O2 levels and increased fatigue and pain. Pt ambulated to bathroom with 4WW and CGA. Pt demonstrated decreased safety and balance during toileting and toilet transfer. Pt required increased assist for bed mobility as well. Pt reported she was unable to complete therapy. Therapist consulted with respiratory therapist, physician, and physical therapist about pt's decline in abilities. Pt was placed on hold from " therapy and sent to ER for CT scan.    Strengths: Able to follow instructions, Alert and oriented, Effective communication skills, Good insight into deficits/needs, Independent prior level of function, Making steady progress towards goals, Motivated for self care and independence, Pleasant and cooperative, Supportive family, Willingly participates in therapeutic activities  Barriers: Generalized weakness, Impaired activity tolerance, Impaired balance, Pain, Pain poorly managed    Plan    Endurance, UE strengthening, balance, functional transfers/mobility  Cooking task frittata  All ingredients in kitchen       Passport items to be completed:  Perform bathroom transfers, complete dressing, complete feeding, get ready for the day, prepare a simple meal, participate in household tasks, adapt home for safety needs, demonstrate home exercise program, complete caregiver training     Occupational Therapy Goals (Active)     Problem: Bathing     Dates: Start: 07/10/21       Goal: STG-Within one week, patient will bathe with supervision using AE and DME as needed.     Dates: Start: 07/10/21       Goal Note filed on 07/13/21 1318 by Alejandra Wan MS,OTR/L     Requires Min A               Problem: Functional Transfers     Dates: Start: 07/10/21       Goal: STG-Within one week, patient will transfer to toilet with supervision.     Dates: Start: 07/10/21       Goal Note filed on 07/13/21 1318 by Alejandra Wan, MS,OTR/L     Requires SBA               Problem: IADL's     Dates: Start: 07/13/21       Goal: STG-Within one week, patient will prepare a meal with SBA at 4WW level     Dates: Start: 07/13/21             Problem: OT Long Term Goals     Dates: Start: 07/10/21       Goal: LTG-By discharge, patient will complete basic self care tasks with mod I to supervision.      Dates: Start: 07/10/21          Goal: LTG-By discharge, patient will perform bathroom transfers with mod I to supervision.     Dates: Start: 07/10/21

## 2021-07-17 NOTE — ASSESSMENT & PLAN NOTE
Between 9.4 to 10.4 for past 3 weeks. No further records.  -Chronic normocytic anemia.  -Iron panel unremarkable.  -Monitor with CBC

## 2021-07-17 NOTE — THERAPY
07/17/21 0929   Precautions   Precautions Fall Risk;TLSO (Thoracolumbosacral orthosis);Spinal / Back Precautions    Comments TLSO EOB, 2L O2   Vitals   Pulse (!) 129   Pulse Oximetry (!) 81 %  (during gait w 4ww)   O2 (LPM) 4   O2 Delivery Device Oxymask   Pain 0 - 10 Group   Location Back   Location Orientation Mid   Therapist Pain Assessment 6;Prior to Activity;During Activity;0   Cognition    Cognition / Consciousness WDL   Level of Consciousness Alert   Gait Functional Level of Assist    Gait Level Of Assist Stand by Assist   Assistive Device 4 Wheel Walker   Distance (Feet) 160  (3-4 standing rests)   # of Times Distance was Traveled 2   Deviation Antalgic;Bradykinetic;Decreased Heel Strike   Transfer Functional Level of Assist   Bed, Chair, Wheelchair Transfer Stand by Assist   Bed Chair Wheelchair Transfer Description Adaptive equipment;Increased time;Supervision for safety;Verbal cueing  (4ww)   Bed Mobility    Supine to Sit Supervised   Sit to Supine Supervised   Sit to Stand Stand by Assist   Scooting Supervised   Rolling Supervised   PT Total Time Spent   PT Individual Total Time Spent (Mins) 60   PT Charge Group   PT Gait Training 1   PT Therapeutic Activities 3   Physical Therapy   Daily Treatment     Patient Name: Peg Louie  Age:  76 y.o., Sex:  female  Medical Record #: [id number]  Today's Date: 7/17/2021     Precautions  Precautions: Fall Risk, TLSO (Thoracolumbosacral orthosis), Spinal / Back Precautions   Comments: TLSO EOB, 2L O2    Subjective    The pt agreed to PT. She c/o shortness of breath, not able to breathe.      Objective    The pt participated in bed mobility and transfer bed to/from chair and gait training. She tolerated 160 ft x 2 needing 3 to 4 standing rests to breathe with deeper breaths and slower rate. Much of the time was focused on breathing technique. Her nurse and respiratory therapist were aware and they monitored the pt as well. As the pt did not improve her  respiratory status, she was transported to [facility] for further evaluation at 11:30 a.m.    Assessment    The pt`s respiratory status remained difficult for her to breathe and maintain her saturation and heart rate at normal levels. She was transported to [facility] for evaluation.  Strengths: Able to follow instructions, Alert and oriented, Effective communication skills, Good carryover of learning, Supportive family, Pleasant and cooperative, Motivated for self care and independence, Making steady progress towards goals, Willingly participates in therapeutic activities  Barriers: Pain, Generalized weakness, Fatigue, Impaired balance, Impaired activity tolerance (Fall Risk)    Plan    Resume therapy as tolerated for transfers, gait 4ww, therapeutic exercise, car transfer training, training with the pt`s daughter particularly the TLSO    Passport items to be completed:  Passport items to be completed:  Get in/out of bed safely, in/out of a vehicle, safely use mobility device, walk or wheel around home/community, navigate up and down stairs, show how to get up/down from the ground, ensure home is accessible, demonstrate HEP, complete caregiver training    Physical Therapy Problems (Active)     Problem: Balance     Dates: Start: 07/10/21       Goal: STG-Within one week, patient will maintain dynamic standing perform Harris balance     Dates: Start: 07/10/21             Problem: Mobility     Dates: Start: 07/10/21       Goal: STG-Within one week, patient will ambulate up/down a curb with LRAD CGA     Dates: Start: 07/10/21          Goal: STG-Within one week, patient will     Dates: Start: 07/13/21       Goal Note filed on 07/13/21 1249 by CHRISTINA Osullivan     1) Individualized goal:   Gait 4ww 450 ft supervision, one week  2) Interventions:  PT Gait Training, PT Therapeutic Exercises, and PT Therapeutic Activity               Problem: Mobility Transfers     Dates: Start: 07/10/21       Goal: STG-Within one week, patient will  transfer bed to chair Josh w/c<>bed in room     Dates: Start: 07/10/21             Problem: PT-Long Term Goals     Dates: Start: 07/10/21       Goal: LTG-By discharge, patient will ambulate LRAD x300' Josh indoors     Dates: Start: 07/10/21          Goal: LTG-By discharge, patient will transfer one surface to another Josh with LRAD     Dates: Start: 07/10/21          Goal: LTG-By discharge, patient will perform home exercise program: Josh with amb and strengthening     Dates: Start: 07/10/21          Goal: LTG-By discharge, patient will transfer in/out of a car SPV with LRAD     Dates: Start: 07/10/21

## 2021-07-18 ENCOUNTER — APPOINTMENT (OUTPATIENT)
Dept: RADIOLOGY | Facility: MEDICAL CENTER | Age: 76
DRG: 175 | End: 2021-07-18
Attending: INTERNAL MEDICINE
Payer: MEDICARE

## 2021-07-18 ENCOUNTER — APPOINTMENT (OUTPATIENT)
Dept: CARDIOLOGY | Facility: MEDICAL CENTER | Age: 76
DRG: 175 | End: 2021-07-18
Attending: INTERNAL MEDICINE
Payer: MEDICARE

## 2021-07-18 PROBLEM — R00.2 PALPITATIONS: Status: ACTIVE | Noted: 2021-07-18

## 2021-07-18 LAB
ANION GAP SERPL CALC-SCNC: 8 MMOL/L (ref 7–16)
BASOPHILS # BLD AUTO: 0.5 % (ref 0–1.8)
BASOPHILS # BLD: 0.03 K/UL (ref 0–0.12)
BUN SERPL-MCNC: 17 MG/DL (ref 8–22)
CALCIUM SERPL-MCNC: 8.7 MG/DL (ref 8.5–10.5)
CHLORIDE SERPL-SCNC: 102 MMOL/L (ref 96–112)
CO2 SERPL-SCNC: 27 MMOL/L (ref 20–33)
CREAT SERPL-MCNC: 0.7 MG/DL (ref 0.5–1.4)
EOSINOPHIL # BLD AUTO: 0.07 K/UL (ref 0–0.51)
EOSINOPHIL NFR BLD: 1.1 % (ref 0–6.9)
ERYTHROCYTE [DISTWIDTH] IN BLOOD BY AUTOMATED COUNT: 50.3 FL (ref 35.9–50)
GLUCOSE SERPL-MCNC: 93 MG/DL (ref 65–99)
HCT VFR BLD AUTO: 30.4 % (ref 37–47)
HGB BLD-MCNC: 9.4 G/DL (ref 12–16)
IMM GRANULOCYTES # BLD AUTO: 0.03 K/UL (ref 0–0.11)
IMM GRANULOCYTES NFR BLD AUTO: 0.5 % (ref 0–0.9)
LV EJECT FRACT  99904: 60
LV EJECT FRACT MOD 2C 99903: 58.52
LV EJECT FRACT MOD 4C 99902: 65.75
LV EJECT FRACT MOD BP 99901: 63.21
LYMPHOCYTES # BLD AUTO: 1.89 K/UL (ref 1–4.8)
LYMPHOCYTES NFR BLD: 28.7 % (ref 22–41)
MCH RBC QN AUTO: 26.9 PG (ref 27–33)
MCHC RBC AUTO-ENTMCNC: 30.9 G/DL (ref 33.6–35)
MCV RBC AUTO: 87.1 FL (ref 81.4–97.8)
MONOCYTES # BLD AUTO: 0.58 K/UL (ref 0–0.85)
MONOCYTES NFR BLD AUTO: 8.8 % (ref 0–13.4)
NEUTROPHILS # BLD AUTO: 3.99 K/UL (ref 2–7.15)
NEUTROPHILS NFR BLD: 60.4 % (ref 44–72)
NRBC # BLD AUTO: 0 K/UL
NRBC BLD-RTO: 0 /100 WBC
PLATELET # BLD AUTO: 208 K/UL (ref 164–446)
PMV BLD AUTO: 10.7 FL (ref 9–12.9)
POTASSIUM SERPL-SCNC: 4.6 MMOL/L (ref 3.6–5.5)
RBC # BLD AUTO: 3.49 M/UL (ref 4.2–5.4)
SODIUM SERPL-SCNC: 137 MMOL/L (ref 135–145)
WBC # BLD AUTO: 6.6 K/UL (ref 4.8–10.8)

## 2021-07-18 PROCEDURE — 770024 HCHG ROOM/CARE - REHAB LOA (180)

## 2021-07-18 PROCEDURE — 99233 SBSQ HOSP IP/OBS HIGH 50: CPT | Mod: GC | Performed by: INTERNAL MEDICINE

## 2021-07-18 PROCEDURE — 700102 HCHG RX REV CODE 250 W/ 637 OVERRIDE(OP)

## 2021-07-18 PROCEDURE — 97161 PT EVAL LOW COMPLEX 20 MIN: CPT

## 2021-07-18 PROCEDURE — 85025 COMPLETE CBC W/AUTO DIFF WBC: CPT

## 2021-07-18 PROCEDURE — 93306 TTE W/DOPPLER COMPLETE: CPT | Mod: 26 | Performed by: INTERNAL MEDICINE

## 2021-07-18 PROCEDURE — A9270 NON-COVERED ITEM OR SERVICE: HCPCS | Performed by: INTERNAL MEDICINE

## 2021-07-18 PROCEDURE — A9270 NON-COVERED ITEM OR SERVICE: HCPCS

## 2021-07-18 PROCEDURE — 700102 HCHG RX REV CODE 250 W/ 637 OVERRIDE(OP): Performed by: INTERNAL MEDICINE

## 2021-07-18 PROCEDURE — 36415 COLL VENOUS BLD VENIPUNCTURE: CPT

## 2021-07-18 PROCEDURE — 770020 HCHG ROOM/CARE - TELE (206)

## 2021-07-18 PROCEDURE — 700111 HCHG RX REV CODE 636 W/ 250 OVERRIDE (IP): Performed by: EMERGENCY MEDICINE

## 2021-07-18 PROCEDURE — 93970 EXTREMITY STUDY: CPT

## 2021-07-18 PROCEDURE — 700105 HCHG RX REV CODE 258: Performed by: INTERNAL MEDICINE

## 2021-07-18 PROCEDURE — 80048 BASIC METABOLIC PNL TOTAL CA: CPT

## 2021-07-18 PROCEDURE — 93306 TTE W/DOPPLER COMPLETE: CPT

## 2021-07-18 RX ORDER — ATENOLOL 50 MG/1
TABLET ORAL
Status: COMPLETED
Start: 2021-07-18 | End: 2021-07-18

## 2021-07-18 RX ORDER — AMOXICILLIN 250 MG
CAPSULE ORAL
Status: COMPLETED
Start: 2021-07-18 | End: 2021-07-18

## 2021-07-18 RX ADMIN — SODIUM CHLORIDE: 9 INJECTION, SOLUTION INTRAVENOUS at 07:39

## 2021-07-18 RX ADMIN — GABAPENTIN 400 MG: 400 CAPSULE ORAL at 04:39

## 2021-07-18 RX ADMIN — OXYCODONE 5 MG: 5 TABLET ORAL at 07:38

## 2021-07-18 RX ADMIN — DOCUSATE SODIUM 50 MG AND SENNOSIDES 8.6 MG 2 TABLET: 8.6; 5 TABLET, FILM COATED ORAL at 04:40

## 2021-07-18 RX ADMIN — GABAPENTIN 400 MG: 400 CAPSULE ORAL at 11:01

## 2021-07-18 RX ADMIN — BACLOFEN 10 MG: 10 TABLET ORAL at 16:57

## 2021-07-18 RX ADMIN — DOCUSATE SODIUM 50 MG AND SENNOSIDES 8.6 MG 2 TABLET: 8.6; 5 TABLET, FILM COATED ORAL at 16:56

## 2021-07-18 RX ADMIN — BACLOFEN 10 MG: 10 TABLET ORAL at 04:39

## 2021-07-18 RX ADMIN — ATENOLOL 50 MG: 50 TABLET ORAL at 04:40

## 2021-07-18 RX ADMIN — GABAPENTIN 400 MG: 400 CAPSULE ORAL at 16:57

## 2021-07-18 RX ADMIN — OXYCODONE 5 MG: 5 TABLET ORAL at 11:01

## 2021-07-18 RX ADMIN — OXYCODONE HYDROCHLORIDE 10 MG: 10 TABLET ORAL at 03:47

## 2021-07-18 RX ADMIN — SODIUM CHLORIDE: 9 INJECTION, SOLUTION INTRAVENOUS at 21:01

## 2021-07-18 RX ADMIN — OXYCODONE 5 MG: 5 TABLET ORAL at 16:57

## 2021-07-18 RX ADMIN — OXYCODONE HYDROCHLORIDE 10 MG: 10 TABLET ORAL at 20:29

## 2021-07-18 RX ADMIN — BACLOFEN 10 MG: 10 TABLET ORAL at 11:01

## 2021-07-18 RX ADMIN — ENOXAPARIN SODIUM 100 MG: 100 INJECTION SUBCUTANEOUS at 16:57

## 2021-07-18 RX ADMIN — ENOXAPARIN SODIUM 100 MG: 100 INJECTION SUBCUTANEOUS at 04:40

## 2021-07-18 ASSESSMENT — COPD QUESTIONNAIRES
COPD SCREENING SCORE: 5
HAVE YOU SMOKED AT LEAST 100 CIGARETTES IN YOUR ENTIRE LIFE: YES
DURING THE PAST 4 WEEKS HOW MUCH DID YOU FEEL SHORT OF BREATH: NONE/LITTLE OF THE TIME
DO YOU EVER COUGH UP ANY MUCUS OR PHLEGM?: NO/ONLY WITH OCCASIONAL COLDS OR INFECTIONS

## 2021-07-18 ASSESSMENT — PAIN DESCRIPTION - PAIN TYPE
TYPE: ACUTE PAIN

## 2021-07-18 ASSESSMENT — ENCOUNTER SYMPTOMS
NAUSEA: 0
VOMITING: 0
FEVER: 0
CHILLS: 0
COUGH: 0
SHORTNESS OF BREATH: 1

## 2021-07-18 ASSESSMENT — COGNITIVE AND FUNCTIONAL STATUS - GENERAL
CLIMB 3 TO 5 STEPS WITH RAILING: A LITTLE
MOVING FROM LYING ON BACK TO SITTING ON SIDE OF FLAT BED: A LITTLE
SUGGESTED CMS G CODE MODIFIER MOBILITY: CJ
WALKING IN HOSPITAL ROOM: A LITTLE
MOBILITY SCORE: 20
STANDING UP FROM CHAIR USING ARMS: A LITTLE

## 2021-07-18 ASSESSMENT — FIBROSIS 4 INDEX: FIB4 SCORE: 1.6

## 2021-07-18 ASSESSMENT — GAIT ASSESSMENTS: GAIT LEVEL OF ASSIST: UNABLE TO PARTICIPATE

## 2021-07-18 NOTE — PROGRESS NOTES
Pt reported increased sob and increased O2 demand, RT and Sup RN notified, MD notified. Pt sent to [facility] via [employer], pt family notified, Daughter picked up pt possessions in room , reports has all possessions. Will continue to monitor.

## 2021-07-18 NOTE — CARE PLAN
The patient is Watcher - Medium risk of patient condition declining or worsening    Shift Goals  Clinical Goals: monitor resp fx, pain control  Patient Goals: Pain control, rest    Progress made toward(s) clinical / shift goals:  Progressing      Problem: Pain - Standard  Goal: Alleviation of pain or a reduction in pain to the patient’s comfort goal  Outcome: Progressing  Note: Pt c/o back pain r/t thoracic fracture. Pt medicated per MAR. Pt's goal is to rest comfortably.      Problem: Knowledge Deficit - Standard  Goal: Patient and family/care givers will demonstrate understanding of plan of care, disease process/condition, diagnostic tests and medications  Outcome: Progressing  Note: Pt updated on POC, all questions answered at this time.      Problem: Fall Risk  Goal: Patient will remain free from falls  Outcome: Progressing

## 2021-07-18 NOTE — PROGRESS NOTES
4 Eyes Skin Assessment Completed by ANGELA Bond and ANGELA Garcia.    Head WDL  Ears Redness and Blanching  Nose WDL  Mouth WDL  Neck WDL  Breast/Chest Moist  Shoulder Blades WDL  Spine WDL  (R) Arm/Elbow/Hand Redness, Blanching, Bruising and Scab  (L) Arm/Elbow/Hand Redness and Blanching  Abdomen WDL  Groin WDL  Scrotum/Coccyx/Buttocks Redness, Blanching and Scab, photo uploaded  (R) Leg Scab  (L) Leg Scab on knee, healing  (R) Heel/Foot/Toe Redness and Blanching  (L) Heel/Foot/Toe Redness and Blanching          Devices In Places Tele Box, Pulse Ox and Oxy Mask      Interventions In Place Gray Ear Foams and Pillows    Possible Skin Injury Yes    Pictures Uploaded Into Epic Yes  Wound Consult Placed N/A  RN Wound Prevention Protocol Ordered Yes

## 2021-07-18 NOTE — ASSESSMENT & PLAN NOTE
Stopped atenolol to avoid masking pulmonary embolism related tachycardia. Patient notes recent palpitations, but plans to address these with her PCP in [place].

## 2021-07-18 NOTE — ED NOTES
Gave bedside report to ANGELA Acevedo for the patient going to tele 8. Patient's belongings placed in bags and sent up with patient and her daughter.

## 2021-07-18 NOTE — PROGRESS NOTES
Pt arrived to unit via gurney at 1840 with this RN. Pt oriented to room, unit, and plan of care. Tele-monitor placed and monitor room notified. All questions answered at this time. Call light within reach; fall precautions in place.

## 2021-07-18 NOTE — THERAPY
"Physical Therapy   Initial Evaluation     Patient Name: Peg Louie  Age:  76 y.o., Sex:  female  Medical Record #: [id number]  Today's Date: 7/18/2021     Precautions: Fall Risk, Spinal / Back Precautions , TLSO (Thoracolumbosacral orthosis)    Assessment  Peg Louie is a 76 y.o. female who presented 7/17/2021 with hypoxia and shortness of breath.  Patient was transferred from acute rehab center secondary to recent fall with spinal thoracic fracture x1 week ago.  Patient was noted to be hypoxic at outlying facility and transferred to [facility] for evaluation.  In the emergency room CTA was positive for bilateral pulmonary embolism with right heart strain.  Today she presents with pain and limited activity tolerance. She is able to get from EOB to chair with Nicholas and needs ModA to don TLSO.  TLSO appears to be too big for patient.  Patient has been at [facility] and making excellent progress with mobility. Anticipate she will DC back to [facility] once medically stable. Will continue to follow while in house.     Plan    Recommend Physical Therapy 4 times per week until therapy goals are met for the following treatments:  Bed Mobility, Equipment, Gait Training, Neuro Re-Education / Balance, Stair Training, Therapeutic Activities and Therapeutic Exercises    DC Equipment Recommendations: Front-Wheel Walker  Discharge Recommendations: Recommend post-acute placement for additional physical therapy services prior to discharge home       Subjective    \"I want to get up and move\"     Objective     07/18/21 0930   Precautions   Precautions Fall Risk;Spinal / Back Precautions ;TLSO (Thoracolumbosacral orthosis)   Comments T7/T8 compression fractures    Pain 0 - 10 Group   Pain Rating Scale (NPRS) 6   Therapist Pain Assessment During Activity   Prior Living Situation   Prior Services Home-Independent   Housing / Facility 1 Story House   Steps Into Home 0   Steps In Home 0   Bathroom Set up Walk In Shower   Equipment Owned " None   Lives with - Patient's Self Care Capacity Alone and Able to Care For Self   Comments Patient is from [place] and was camping with her daughter in [facility]. She plans to DC to her daughters house in [place] where there are no stairs. She has been at [facility] for 2 weeks now    Prior Level of Functional Mobility   Bed Mobility Independent   Transfer Status Independent   Ambulation Independent   Distance Ambulation (Feet)   (community distances )   Assistive Devices Used None   Stairs Independent   History of Falls   History of Falls Yes   Date of Last Fall   (admitted 2 weeks ago s/p fall )   Cognition    Cognition / Consciousness WDL   Comments pleasant and cooperative    Passive ROM Lower Body   Passive ROM Lower Body WDL   Active ROM Lower Body    Active ROM Lower Body  X   Comments limited by back pain    Strength Lower Body   Lower Body Strength  WDL   Sensation Lower Body   Lower Extremity Sensation   WDL   Strength Upper Body   Upper Body Strength  WDL   Vision   Vision Comments wears glasses    Balance Assessment   Sitting Balance (Static) Fair   Sitting Balance (Dynamic) Fair   Standing Balance (Static) Fair   Standing Balance (Dynamic) Fair -   Weight Shift Sitting Fair   Weight Shift Standing Fair   Comments no AD   Gait Analysis   Gait Level Of Assist Unable to Participate   Comments Desatting to low 80s with transfer to chair    Bed Mobility    Supine to Sit Supervised   Sit to Supine Supervised   Scooting Supervised   Rolling Supervised   Comments cueing for log roll technique    Functional Mobility   Sit to Stand Minimal Assist   Bed, Chair, Wheelchair Transfer Supervised   Comments [facility] for transfer.  Patient needing ModA for donning TLSO at EOB    How much difficulty does the patient currently have...   Turning over in bed (including adjusting bedclothes, sheets and blankets)? 4   Sitting down on and standing up from a chair with arms (e.g., wheelchair, bedside commode, etc.) 3   Moving from lying  on back to sitting on the side of the bed? 4   How much help from another person does the patient currently need...   Moving to and from a bed to a chair (including a wheelchair)? 3   Need to walk in a hospital room? 3   Climbing 3-5 steps with a railing? 3   6 clicks Mobility Score 20   Activity Tolerance   Sitting in Chair post session    Sitting Edge of Bed 5 min   Standing 2 min    Comments limited by SOB with minimal activity    Patient / Family Goals    Patient / Family Goal #1 to go home    Short Term Goals    Short Term Goal # 1 in 6 visits patient will demo all functional transfers Orestes for safe DC home    Short Term Goal # 2 in 6 visits patient will ambulate 200' Orestes for safe DC home    Education Group   Education Provided Brace Wear and Care;Role of Physical Therapist   Role of Physical Therapist Patient Response Patient;Acceptance;Explanation;Demonstration;Verbal Demonstration   Brace Wear & Care Patient Response Patient;Acceptance;Explanation;Demonstration;Verbal Demonstration;Action Demonstration   Problem List    Problems Pain;Impaired Transfers;Impaired Ambulation;Decreased Activity Tolerance   Anticipated Discharge Equipment and Recommendations   DC Equipment Recommendations Front-Wheel Walker   Discharge Recommendations Recommend post-acute placement for additional physical therapy services prior to discharge home       Lydia Leonardo, PT, DPT, GCS

## 2021-07-18 NOTE — CARE PLAN
Problem: Pain - Standard  Goal: Alleviation of pain or a reduction in pain to the patient’s comfort goal  Outcome: Progressing     Problem: Fall Risk  Goal: Patient will remain free from falls  Outcome: Progressing     The patient is Stable - Low risk of patient condition declining or worsening    Shift Goals  Clinical Goals: Pain control, Wean oxygen  Patient Goals: Pain control    Progress made toward(s) clinical / shift goals:  Fall precautions in place. Bed in lowest position. Non-skid socks in place. Personal belongings within reach. Mobility sign on door. Call light within reach. Pt educated regarding fall prevention and verbalized understanding. Patient educated on pain medications available. Patient verbalized understanding.    Patient is not progressing towards the following goals:

## 2021-07-18 NOTE — ASSESSMENT & PLAN NOTE
CTA showed bilateral lobar and segmental pulmonary emboli. RV/LV ratio 1.3. Hemodynamically stable on presentation.   -Found to have submassive pulmonary embolism and started on apixaban.   - Goal SpO2 >94%, currently on 1L of SpO2  - Continue apixaban for 3-6 months after discharge depending on tolerance.

## 2021-07-18 NOTE — ED NOTES
Lab called for a blue top re-collect. Blue top sent to lab. Patient is lying semifowler's in bed and has chronic back pain.

## 2021-07-18 NOTE — PROGRESS NOTES
Assumed care of pt. Bedside report received from ANGELA Acevedo. Pt was updated on plan of care. Call light, phone and personal belongings within reach. Bed locked in lowest position, 2 side rails up, bed alarm on and working appropriately.

## 2021-07-18 NOTE — PROGRESS NOTES
"Daily Progress Note:     Date of Service: 7/18/2021  Primary Team: [facility] Team   Attending: Adonis Dixon M.D.   Senior Resident: Dr. Jose Torres  Intern: Dr. Shamar Recio  Contact:  [phone]    Chief Complaint:   Shortness of breath, back pain    Subjective:  76 year old woman with history of \"irregular heart beat\" presented on 07/17/2021 with hypoxia and shortness of breath.   She is from [place] and was on a mother-daughter road trip when she fell off the camper van fracturing her T7-T8. She originally went to [facility] where they then transferred her to [facility]. Neurosurgery did not recommend surgery and so she was then transferred to acute rehab. Records indicate patient wished to stop Lovenox on 07/15/2021. The next day the patient was found to be hypoxic with low blood pressure and lightheaded. The next day she continued to feel short of breath and tachycardic. Presented to ER where she was found to have bilateral pulmonary embolism on CTA. She was started on Lovenox and given 2L of NS.     Review of Systems:    Review of Systems   Constitutional: Negative for chills and fever.   Respiratory: Positive for shortness of breath. Negative for cough.    Cardiovascular: Negative for chest pain.   Gastrointestinal: Negative for nausea and vomiting.       Objective Data:   Physical Exam:   Vitals:   Temp:  [35.8 °C (96.4 °F)-36.5 °C (97.7 °F)] 36.2 °C (97.1 °F)  Pulse:  [68-90] 68  Resp:  [16-24] 16  BP: ()/(54-76) 101/61  SpO2:  [90 %-98 %] 93 %    Physical Exam  Constitutional:       General: She is not in acute distress.     Appearance: She is not diaphoretic.   HENT:      Head: Normocephalic and atraumatic.   Cardiovascular:      Rate and Rhythm: Normal rate and regular rhythm.   Pulmonary:      Comments: Normal breath sounds in right upper and right middle lobes, decrease breath sounds everywhere else.   Musculoskeletal:      Comments: 1+ distal pulses bilaterally, no pedal edema. "       Labs:   07/18/2021: wbc 6.6, hg 9.4 (down from 10.4 day before), hct 30.4, platelet 208, sodium 137, potassium 4.6, chloride 102, CO2 27, BUN 17, Cr 0.70, calcium 8.7, Trop T 61, COVID negative, INR 1.20    Imaging:   CTA pulmonary artery showed multiple bilateral lobar and segmental emboli. RV/LV ratio elevated @ 1.3 consistent with right heart strain. Patchy atelectasis/pleural effusion. Indeterminate 4 mm nodule within right upper lobe inferior. Scarring atelectasis.     Bilateral lower extremity venous doppler 07/18/2021: Right lower extremity - Evidence of acute occlusive deep vein thrombosis in the right peroneal veins.    All other vessels are demonstrated complete color filling and    compressibility with normal venous flow dynamics including spontaneous    flow, response to augmentation maneuvers, and respiratory phasicity.       Left lower extremity -.    Evidence of acute occlusive deep vein thrombosis in the one of the paired    peroneal veins.    All other vessles are demonstrated complete color filling and    compressibility with normal venous flow dynamics including spontaneous    flow, response to augmentation maneuvers, and respiratory phasicity.    Echocardiogram 07/18/2021:  No prior study is available for comparison.   Left ventricular ejection fraction is visually estimated to be 60%.  Unable to estimate pulmonary artery pressure due to an inadequate   tricuspid regurgitant jet.        Chest x-ray showed enlarged cardiac silhouette with bibasilar atelectasis.     ECG, per my read, was sinus, ~97 bpm, normal axis, slight ST depression (1-2mm) in II,III, v2-v5.       Problem Representation:  76 year old woman visiting from [place] who sustained T7-T8 fracture after falling off [employer] van while on road trip with her daughter who was then found to have submassive pulmonary embolism during acute rehab.      * Acute pulmonary embolism (HCC)- (present on admission)  Assessment & Plan  Multiple  bilateral lobar and segmental emboli with SBP >90mmHG categorize patient as having a submassive pulmonary embolism. Likely related to debility secondary to T7-T8 fracture. Still experiencing some shortness of breath despite 6L of O2. Acute occlusive deep vein thrombosis in right peroneal vein and acute occlusive deep vein thrombosis in one of the paired peroneal veins on left lower extremity. Echocardiogram showed no RV dysfunction or dilatation.   Plan:  - On Lovenox 100 mg injection Q 12 hours.   - O2 as needed, keep SpO2 >94%      Debility- (present on admission)  Assessment & Plan  From acute rehab  Will need to return to rehab once stabilized    Spinal fracture of T8 vertebra (HCC)- (present on admission)  Assessment & Plan  Secondary to fall, recent fracture  Transferred from acute rehab center    Spinal fracture of T7 vertebra (HCC)- (present on admission)  Assessment & Plan  Secondary to fall, recent fracture approximately 2 weeks ago.   - PT consulted regarding TSLO fitting and rehab.  - Will be discharged back to acute rehab once stable.   - On baclofen, gabapentin, lidocaine patch and PRN oxycodone.       Palpitations  Assessment & Plan  Takes atenolol for palpitations. Denies history of atrial fibrillation. On telemetry.   - Stop atenolol to avoid masking pulmonary embolism related tachycardia.     Anemia- (present on admission)  Assessment & Plan  Between 9.4 to 10.4 for past 2 weeks. No further records.  - Monitor with CBC    HTN (hypertension)- (present on admission)  Assessment & Plan  Controlled, continue home medication

## 2021-07-19 ENCOUNTER — APPOINTMENT (OUTPATIENT)
Dept: RADIOLOGY | Facility: REHABILITATION | Age: 76
DRG: 175 | End: 2021-07-19
Attending: HOSPITALIST
Payer: MEDICARE

## 2021-07-19 LAB
ANION GAP SERPL CALC-SCNC: 7 MMOL/L (ref 7–16)
BASOPHILS # BLD AUTO: 0.8 % (ref 0–1.8)
BASOPHILS # BLD: 0.05 K/UL (ref 0–0.12)
BUN SERPL-MCNC: 15 MG/DL (ref 8–22)
CALCIUM SERPL-MCNC: 8.8 MG/DL (ref 8.5–10.5)
CHLORIDE SERPL-SCNC: 102 MMOL/L (ref 96–112)
CO2 SERPL-SCNC: 27 MMOL/L (ref 20–33)
CREAT SERPL-MCNC: 0.73 MG/DL (ref 0.5–1.4)
EOSINOPHIL # BLD AUTO: 0.09 K/UL (ref 0–0.51)
EOSINOPHIL NFR BLD: 1.5 % (ref 0–6.9)
ERYTHROCYTE [DISTWIDTH] IN BLOOD BY AUTOMATED COUNT: 51.8 FL (ref 35.9–50)
GLUCOSE SERPL-MCNC: 95 MG/DL (ref 65–99)
HCT VFR BLD AUTO: 31.3 % (ref 37–47)
HGB BLD-MCNC: 9.5 G/DL (ref 12–16)
IMM GRANULOCYTES # BLD AUTO: 0.04 K/UL (ref 0–0.11)
IMM GRANULOCYTES NFR BLD AUTO: 0.7 % (ref 0–0.9)
LYMPHOCYTES # BLD AUTO: 2.06 K/UL (ref 1–4.8)
LYMPHOCYTES NFR BLD: 34 % (ref 22–41)
MCH RBC QN AUTO: 27.4 PG (ref 27–33)
MCHC RBC AUTO-ENTMCNC: 30.4 G/DL (ref 33.6–35)
MCV RBC AUTO: 90.2 FL (ref 81.4–97.8)
MONOCYTES # BLD AUTO: 0.48 K/UL (ref 0–0.85)
MONOCYTES NFR BLD AUTO: 7.9 % (ref 0–13.4)
NEUTROPHILS # BLD AUTO: 3.33 K/UL (ref 2–7.15)
NEUTROPHILS NFR BLD: 55.1 % (ref 44–72)
NRBC # BLD AUTO: 0 K/UL
NRBC BLD-RTO: 0 /100 WBC
PLATELET # BLD AUTO: 210 K/UL (ref 164–446)
PMV BLD AUTO: 10.5 FL (ref 9–12.9)
POTASSIUM SERPL-SCNC: 4.7 MMOL/L (ref 3.6–5.5)
RBC # BLD AUTO: 3.47 M/UL (ref 4.2–5.4)
SODIUM SERPL-SCNC: 136 MMOL/L (ref 135–145)
WBC # BLD AUTO: 6.1 K/UL (ref 4.8–10.8)

## 2021-07-19 PROCEDURE — 770020 HCHG ROOM/CARE - TELE (206)

## 2021-07-19 PROCEDURE — 97166 OT EVAL MOD COMPLEX 45 MIN: CPT

## 2021-07-19 PROCEDURE — 700102 HCHG RX REV CODE 250 W/ 637 OVERRIDE(OP): Performed by: INTERNAL MEDICINE

## 2021-07-19 PROCEDURE — A9270 NON-COVERED ITEM OR SERVICE: HCPCS | Performed by: INTERNAL MEDICINE

## 2021-07-19 PROCEDURE — A9270 NON-COVERED ITEM OR SERVICE: HCPCS | Performed by: STUDENT IN AN ORGANIZED HEALTH CARE EDUCATION/TRAINING PROGRAM

## 2021-07-19 PROCEDURE — 700111 HCHG RX REV CODE 636 W/ 250 OVERRIDE (IP): Performed by: EMERGENCY MEDICINE

## 2021-07-19 PROCEDURE — 80048 BASIC METABOLIC PNL TOTAL CA: CPT

## 2021-07-19 PROCEDURE — 36415 COLL VENOUS BLD VENIPUNCTURE: CPT

## 2021-07-19 PROCEDURE — 94760 N-INVAS EAR/PLS OXIMETRY 1: CPT

## 2021-07-19 PROCEDURE — 85025 COMPLETE CBC W/AUTO DIFF WBC: CPT

## 2021-07-19 PROCEDURE — 700102 HCHG RX REV CODE 250 W/ 637 OVERRIDE(OP): Performed by: STUDENT IN AN ORGANIZED HEALTH CARE EDUCATION/TRAINING PROGRAM

## 2021-07-19 PROCEDURE — 99233 SBSQ HOSP IP/OBS HIGH 50: CPT | Mod: GC | Performed by: INTERNAL MEDICINE

## 2021-07-19 PROCEDURE — 770024 HCHG ROOM/CARE - REHAB LOA (180)

## 2021-07-19 RX ORDER — CYCLOBENZAPRINE HCL 10 MG
5 TABLET ORAL 3 TIMES DAILY PRN
Status: DISCONTINUED | OUTPATIENT
Start: 2021-07-19 | End: 2021-07-27 | Stop reason: HOSPADM

## 2021-07-19 RX ORDER — BACLOFEN 10 MG/1
10 TABLET ORAL 3 TIMES DAILY PRN
Status: DISCONTINUED | OUTPATIENT
Start: 2021-07-19 | End: 2021-07-27 | Stop reason: HOSPADM

## 2021-07-19 RX ADMIN — GABAPENTIN 400 MG: 400 CAPSULE ORAL at 17:19

## 2021-07-19 RX ADMIN — GABAPENTIN 400 MG: 400 CAPSULE ORAL at 11:05

## 2021-07-19 RX ADMIN — DOCUSATE SODIUM 50 MG AND SENNOSIDES 8.6 MG 2 TABLET: 8.6; 5 TABLET, FILM COATED ORAL at 17:19

## 2021-07-19 RX ADMIN — DOCUSATE SODIUM 50 MG AND SENNOSIDES 8.6 MG 2 TABLET: 8.6; 5 TABLET, FILM COATED ORAL at 04:08

## 2021-07-19 RX ADMIN — OXYCODONE 5 MG: 5 TABLET ORAL at 11:05

## 2021-07-19 RX ADMIN — BACLOFEN 10 MG: 10 TABLET ORAL at 04:08

## 2021-07-19 RX ADMIN — BACLOFEN 10 MG: 10 TABLET ORAL at 11:05

## 2021-07-19 RX ADMIN — OXYCODONE HYDROCHLORIDE 10 MG: 10 TABLET ORAL at 21:06

## 2021-07-19 RX ADMIN — OXYCODONE 5 MG: 5 TABLET ORAL at 02:41

## 2021-07-19 RX ADMIN — GABAPENTIN 400 MG: 400 CAPSULE ORAL at 04:08

## 2021-07-19 RX ADMIN — OXYCODONE 5 MG: 5 TABLET ORAL at 14:15

## 2021-07-19 RX ADMIN — APIXABAN 10 MG: 5 TABLET, FILM COATED ORAL at 17:19

## 2021-07-19 RX ADMIN — ENOXAPARIN SODIUM 100 MG: 100 INJECTION SUBCUTANEOUS at 04:09

## 2021-07-19 RX ADMIN — OXYCODONE 5 MG: 5 TABLET ORAL at 17:20

## 2021-07-19 RX ADMIN — OXYCODONE 5 MG: 5 TABLET ORAL at 07:55

## 2021-07-19 ASSESSMENT — COGNITIVE AND FUNCTIONAL STATUS - GENERAL
DRESSING REGULAR UPPER BODY CLOTHING: A LITTLE
DAILY ACTIVITIY SCORE: 20
SUGGESTED CMS G CODE MODIFIER DAILY ACTIVITY: CJ
DRESSING REGULAR LOWER BODY CLOTHING: A LITTLE
TOILETING: A LITTLE
HELP NEEDED FOR BATHING: A LITTLE

## 2021-07-19 ASSESSMENT — ENCOUNTER SYMPTOMS
COUGH: 0
BACK PAIN: 1
NAUSEA: 0
SHORTNESS OF BREATH: 1
CONSTIPATION: 0
DIARRHEA: 0
FEVER: 0
PALPITATIONS: 0
CHILLS: 0
VOMITING: 0

## 2021-07-19 ASSESSMENT — ACTIVITIES OF DAILY LIVING (ADL): TOILETING: INDEPENDENT

## 2021-07-19 ASSESSMENT — PAIN DESCRIPTION - PAIN TYPE
TYPE: ACUTE PAIN

## 2021-07-19 ASSESSMENT — GAIT ASSESSMENTS: DISTANCE (FEET): 30

## 2021-07-19 ASSESSMENT — FIBROSIS 4 INDEX: FIB4 SCORE: 1.59

## 2021-07-19 NOTE — PROGRESS NOTES
"Daily Progress Note:     Date of Service: 7/19/2021  Primary Team: [facility] Team   Attending: Barrett Beasley M.D.   Senior Resident: Dr. Shazia Fang  Intern: Dr. Shamar Recio  Contact:  [phone]    Chief Complaint:   Shortness of breath, back pain    Subjective:  76 year old woman with history of \"irregular heart beat\" presented on 07/17/2021 with hypoxia and shortness of breath.   She is from [place] and was on a mother-daughter road trip when she fell off the camper van fracturing her T7-T8. She originally went to [facility] where they then transferred her to [facility]. Neurosurgery did not recommend surgery and so she was then transferred to acute rehab. Records indicate patient wished to stop Lovenox on 07/15/2021. The next day the patient was found to be hypoxic with low blood pressure and lightheaded. The next day she continued to feel short of breath and tachycardic. Presented to ER where she was found to have bilateral pulmonary embolism on CTA. She was started on Lovenox and given 2L of NS.     Interval History:  Notes shortness of breath has slightly improved, now down to 5L of NC. Has been off atenolol for over 24 hours. Notes feeling anxious about not taking the medication, but reports no palpitations or tachycardia. Still experiencing some back pain even on her home dose of baclofen. Reports some new swallowing difficulties.     Review of Systems:    Review of Systems   Constitutional: Negative for chills and fever.   Respiratory: Positive for shortness of breath. Negative for cough.    Cardiovascular: Negative for chest pain and palpitations.   Gastrointestinal: Negative for constipation, diarrhea, nausea and vomiting.   Musculoskeletal: Positive for back pain.       Objective Data:   Physical Exam:   Vitals:   Temp:  [35.9 °C (96.6 °F)-37 °C (98.6 °F)] 37 °C (98.6 °F)  Pulse:  [63-78] 76  Resp:  [16-18] 16  BP: ()/(53-71) 132/60  SpO2:  [92 %-98 %] 97 %    Physical " Exam  Constitutional:       General: She is not in acute distress.     Appearance: She is not diaphoretic.   HENT:      Head: Normocephalic and atraumatic.   Cardiovascular:      Rate and Rhythm: Normal rate and regular rhythm.   Pulmonary:      Effort: Pulmonary effort is normal.      Breath sounds: Normal breath sounds.   Musculoskeletal:      Comments: 1+ distal pulses bilaterally, no pedal edema.       Labs:     Recent Labs     07/17/21  1221 07/18/21  0226 07/19/21  0235   WBC 7.0 6.6 6.1   RBC 3.95* 3.49* 3.47*   HEMOGLOBIN 10.6* 9.4* 9.5*   HEMATOCRIT 34.5* 30.4* 31.3*   MCV 87.3 87.1 90.2   MCH 26.8* 26.9* 27.4   RDW 50.3* 50.3* 51.8*   PLATELETCT 200 208 210   MPV 10.0 10.7 10.5   NEUTSPOLYS 77.20* 60.40 55.10   LYMPHOCYTES 14.50* 28.70 34.00   MONOCYTES 6.80 8.80 7.90   EOSINOPHILS 0.30 1.10 1.50   BASOPHILS 0.60 0.50 0.80     Recent Labs     07/17/21  1221 07/18/21  0226 07/19/21  0235   SODIUM 136 137 136   POTASSIUM 4.7 4.6 4.7   CHLORIDE 98 102 102   CO2 26 27 27   GLUCOSE 109* 93 95   BUN 17 17 15         Imaging:   CTA pulmonary artery showed multiple bilateral lobar and segmental emboli. RV/LV ratio elevated @ 1.3 consistent with right heart strain. Patchy atelectasis/pleural effusion. Indeterminate 4 mm nodule within right upper lobe inferior. Scarring atelectasis.     Bilateral lower extremity venous doppler 07/18/2021: Right lower extremity - Evidence of acute occlusive deep vein thrombosis in the right peroneal veins.    All other vessels are demonstrated complete color filling and    compressibility with normal venous flow dynamics including spontaneous    flow, response to augmentation maneuvers, and respiratory phasicity.       Left lower extremity -.    Evidence of acute occlusive deep vein thrombosis in the one of the paired    peroneal veins.    All other vessles are demonstrated complete color filling and    compressibility with normal venous flow dynamics including spontaneous    flow,  response to augmentation maneuvers, and respiratory phasicity.    Echocardiogram 07/18/2021:  No prior study is available for comparison.   Left ventricular ejection fraction is visually estimated to be 60%.  Unable to estimate pulmonary artery pressure due to an inadequate   tricuspid regurgitant jet.        Chest x-ray showed enlarged cardiac silhouette with bibasilar atelectasis.     ECG -7/18/2021, per my read, was sinus, ~97 bpm, normal axis, slight ST depression (1-2mm) in II,III, v2-v5.       Problem Representation:  76 year old woman visiting from [place] who sustained T7-T8 fracture after falling off [employer] van while on road trip with her daughter who was then found to have submassive pulmonary embolism during acute rehab.      * Acute pulmonary embolism (HCC)- (present on admission)  Assessment & Plan  Multiple bilateral lobar and segmental emboli with SBP >90mmHG categorize patient as having a submassive pulmonary embolism. Likely related to debility secondary to T7-T8 fracture. Still experiencing some shortness of breath despite 6L of O2. Acute occlusive deep vein thrombosis in right peroneal vein and acute occlusive deep vein thrombosis in one of the paired peroneal veins on left lower extremity. Echocardiogram showed no RV dysfunction or dilatation. No family history of blood clots, no history of blood clots or hormone replacement therapy. On Lovenox 100 mg injection Q 12 hours.   Plan:  - Switch from lovenox to apixaban 10 mg BID for 5 days until 07/23/2021, then transition to apixaban 5 mg BID after for minimum of 6 months.   - O2 as needed, continue trying to ween down, keep SpO2 >92%  - Continue using incentive spirometer hourly.      Debility- (present on admission)  Assessment & Plan  PMR recommended home with outpatient rehab vs. Skilled nursing facility, daughter may look for skilled nursing in her area.     Spinal fracture of T8 vertebra (HCC)- (present on admission)  Assessment &  Plan  Secondary to fall, recent fracture  Transferred from acute rehab center    Spinal fracture of T7 vertebra (HCC)- (present on admission)  Assessment & Plan  Secondary to fall, recent fracture approximately 2 weeks ago.   - Skilled nursing facility vs home with outpatient physical rehab per PMR.   - On baclofen, gabapentin and PRN oxycodone.  - Added flexeril PRN to alternate with baclofen PRN to determine which gives better relief.      Palpitations  Assessment & Plan  Stopped atenolol to avoid masking pulmonary embolism related tachycardia. No recent palpitations. Patient feels anxious about not taking atenolol. If anxiety persists, will consider anti-anxiety medication over beta blocker.     Anemia- (present on admission)  Assessment & Plan  Between 9.4 to 10.4 for past 2 weeks. No further records.  - Monitor with CBC    HTN (hypertension)- (present on admission)  Assessment & Plan  Controlled, continue home medication

## 2021-07-19 NOTE — PROGRESS NOTES
Assumed care of pt. Bedside report received from ANGELA Mccullough. Pt was updated on plan of care. Call light, phone and personal belongings within reach. Bed locked in lowest position, 2 side rails up. Pt A&Ox4, on 5L oxymask.

## 2021-07-19 NOTE — CARE PLAN
The patient is Stable - Low risk of patient condition declining or worsening    Shift Goals  Clinical Goals: Pain control, wean oxygen  Patient Goals: Pain control, rest    Progress made toward(s) clinical / shift goals:  Progressing      Problem: Pain - Standard  Goal: Alleviation of pain or a reduction in pain to the patient’s comfort goal  Outcome: Progressing  Note: Pt medicated for pain per MAR. Pt now resting comfortably in bed which is her goal.      Problem: Knowledge Deficit - Standard  Goal: Patient and family/care givers will demonstrate understanding of plan of care, disease process/condition, diagnostic tests and medications  Outcome: Progressing     Problem: Fall Risk  Goal: Patient will remain free from falls  Outcome: Progressing  Note: Pt refusing bed alarm r/t back pain. Pt educated to call before getting out of bed. Pt calls appropriately and states understanding.

## 2021-07-19 NOTE — CARE PLAN
Problem: Pain - Standard  Goal: Alleviation of pain or a reduction in pain to the patient’s comfort goal  Outcome: Progressing     Problem: Fall Risk  Goal: Patient will remain free from falls  Outcome: Progressing     The patient is Stable - Low risk of patient condition declining or worsening    Shift Goals  Clinical Goals: Pain control  Patient Goals: Rest    Progress made toward(s) clinical / shift goals:  Fall precautions in place. Bed in lowest position. Non-skid socks in place. Personal belongings within reach. Mobility sign on door. Call light within reach. Pt educated regarding fall prevention and verbalized understanding. Patient educated on pain meds and to use prior to ambulation. Patient verbalized understanding.

## 2021-07-19 NOTE — DISCHARGE PLANNING
Referral for rehab discussed with Dr. Penn. Per Dr. Penn he is recommending home with outpatient versus skilled nursing when medically cleared. No physiatry consult ordered per protocol. Daughter may be looking for skilled nursing in her area.

## 2021-07-19 NOTE — THERAPY
Occupational Therapy   Initial Evaluation     Patient Name: Peg Louie  Age:  76 y.o., Sex:  female  Medical Record #: [id number]  Today's Date: 7/19/2021     Precautions  Precautions: (P) Fall Risk, TLSO (Thoracolumbosacral orthosis), Spinal / Back Precautions   Comments: (P) T7/8 compression fracture. non compliant with TLSO wear.. feels it is choking her when she tries to wear it    Assessment  Patient is 76 y.o. female with a diagnosis of fall from camper, resultant T7/8 compression fractures, developed PEs at acute rheab.  Additional factors influencing patient status / progress: good family support available on DC, prior level of independence, motivated. Will benefit from OT to focus on ADLs, spinal precautions, TLSO wear, transfers, functional endurance.      Plan    Recommend Occupational Therapy 3 times per week until therapy goals are met for the following treatments:  Adaptive Equipment, Self Care/Activities of Daily Living, Therapeutic Activities and Therapeutic Exercises.    DC Equipment Recommendations: (P) Unable to determine at this time  Discharge Recommendations: (P) Recommend post-acute placement for additional occupational therapy services prior to discharge home     Subjective    Pleasant and cooperative throughout     Objective       07/19/21 0801   Total Time Spent   Total Time Spent (Mins)    Charge Group   OT Evaluation OT Evaluation Mod   Initial Contact Note    Initial Contact Note Order Received and Verified, Occupational Therapy Evaluation in Progress with Full Report to Follow.   Prior Living Situation   Prior Services Home-Independent  (was at acute rehab, transferred acute)   Housing / Facility [address] [facility]   Steps Into Home 0   Steps In Home 0   Bathroom Set up Walk In Shower   Equipment Owned None   Lives with - Patient's Self Care Capacity Alone and Able to Care For Self   [place]. was camping in [place] with her daughter, fell from  camper step. independent prior to fall. has been at acute rehab, where she developed PEs   Prior Level of ADL Function   Self Feeding Independent   Grooming / Hygiene Independent   Bathing Independent   Dressing Independent   Toileting Independent   Prior Level of IADL Function   Medication Management Independent   Laundry Independent   Kitchen Mobility Independent   Finances Independent   Home Management Independent   Shopping Independent   Prior Level Of Mobility Independent Without Device in Community   Driving / Transportation Driving Independent   Leisure Interests Travel   History of Falls   History of Falls Yes   Date of Last Fall   (reason for admit)   Precautions   Precautions Fall Risk;TLSO (Thoracolumbosacral orthosis);Spinal / Back Precautions    Comments T7/8 compression fracture. non compliant with TLSO wear.. feels it is choking her when she tries to wear it   Vitals   O2 (LPM) 5   O2 Delivery Device Oxymask   Pain 0 - 10 Group   Therapist Pain Assessment During Activity;Post Activity Pain Same as Prior to Activity;Nurse Notified;6  (activity does not change pain level)   Cognition    Cognition / Consciousness WDL   Comments pleasant and cooperative throughout. mildly anxious at times, mostly surrounding TLSO use.   Passive ROM Upper Body   Passive ROM Upper Body WDL   Active ROM Upper Body   Active ROM Upper Body  WDL   Dominant Hand Right   Strength Upper Body   Upper Body Strength  WDL   Sensation Upper Body   Upper Extremity Sensation  WDL   Upper Body Muscle Tone   Upper Body Muscle Tone  WDL   Coordination Upper Body   Coordination WDL   Balance Assessment   Sitting Balance (Static) Fair   Sitting Balance (Dynamic) Fair   Standing Balance (Static) Fair   Standing Balance (Dynamic) Fair -   Weight Shift Sitting Fair   Weight Shift Standing Fair   Bed Mobility    Supine to Sit Supervised   Sit to Supine Supervised   Scooting Supervised   Rolling Supervised   Comments mod cues for log roll  technique   ADL Assessment   Eating Supervision   Grooming Supervision;Seated;Standing   Bathing   (NT)   Upper Body Dressing Supervision  (refused TLSO)   Lower Body Dressing Minimal Assist  (shoes, underwear. cues for spinal precautions)   Toileting Supervision   How much help from another person does the patient currently need...   Putting on and taking off regular lower body clothing? 3   Bathing (including washing, rinsing, and drying)? 3   Toileting, which includes using a toilet, bedpan, or urinal? 3   Putting on and taking off regular upper body clothing? 3   Taking care of personal grooming such as brushing teeth? 4   Eating meals? 4   6 Clicks Daily Activity Score 20   Functional Mobility   Sit to Stand   (SBA)   Bed, Chair, Wheelchair Transfer Supervised   Toilet Transfers Minimal Assist   Transfer Method Stand Step   Distance (Feet) 30   # of Times Distance was Traveled 2   Comments using FWW   Visual Perception   Visual Perception  WDL   Activity Tolerance   Sitting Edge of Bed 5 mins   Standing 3 mins   Comments min WANG noted with activity, resolves within 1 minute of seated rest   Patient / Family Goals   Patient / Family Goal #1 go home   Short Term Goals   Short Term Goal # 1 set up for TLSO don/ doff   Short Term Goal # 2 set up for LB dressingt asks, appropriate use of AE   Short Term Goal # 3 complete basic ADLs without need for cueing to maintain back precautions   Short Term Goal # 4 tolerate 10 minutes standing at sink, with SBA, for ADL participation   Education Group   Education Provided Back Safety   Role of Occupational Therapist Patient Response Patient;Acceptance;Explanation;Demonstration;Reinforcement Needed   Back Safety Patient Response Patient;Acceptance;Explanation;Demonstration;Reinforcement Needed   Brace Wear & Care Patient Response Patient;Acceptance;Explanation;Demonstration;Reinforcement Needed   Additional Comments mod cues required to maintain spinal precautions during  activity   Problem List   Problem List Decreased Active Daily Living Skills;Decreased Functional Mobility;Decreased Activity Tolerance;Limited Knowledge of Post Op Precautions   Anticipated Discharge Equipment and Recommendations   DC Equipment Recommendations Unable to determine at this time   Discharge Recommendations Recommend post-acute placement for additional occupational therapy services prior to discharge home   Interdisciplinary Plan of Care Collaboration   IDT Collaboration with  Nursing   Patient Position at End of Therapy In Bed;Call Light within Reach;Tray Table within Reach;Phone within Reach   Collaboration Comments report given   Session Information   Date / Session Number  7/19,1 ( 1/3, 7/25)   Priority 3

## 2021-07-20 PROCEDURE — A9270 NON-COVERED ITEM OR SERVICE: HCPCS | Performed by: STUDENT IN AN ORGANIZED HEALTH CARE EDUCATION/TRAINING PROGRAM

## 2021-07-20 PROCEDURE — 97530 THERAPEUTIC ACTIVITIES: CPT

## 2021-07-20 PROCEDURE — L0464 TLSO 4MOD SACRO-SCAP PRE: HCPCS

## 2021-07-20 PROCEDURE — 92610 EVALUATE SWALLOWING FUNCTION: CPT

## 2021-07-20 PROCEDURE — 97535 SELF CARE MNGMENT TRAINING: CPT

## 2021-07-20 PROCEDURE — 306637 HCHG MISC ORTHO ITEM RC 0274

## 2021-07-20 PROCEDURE — 700102 HCHG RX REV CODE 250 W/ 637 OVERRIDE(OP): Performed by: INTERNAL MEDICINE

## 2021-07-20 PROCEDURE — 700102 HCHG RX REV CODE 250 W/ 637 OVERRIDE(OP): Performed by: STUDENT IN AN ORGANIZED HEALTH CARE EDUCATION/TRAINING PROGRAM

## 2021-07-20 PROCEDURE — 97116 GAIT TRAINING THERAPY: CPT

## 2021-07-20 PROCEDURE — A9270 NON-COVERED ITEM OR SERVICE: HCPCS | Performed by: INTERNAL MEDICINE

## 2021-07-20 PROCEDURE — 99232 SBSQ HOSP IP/OBS MODERATE 35: CPT | Mod: GC | Performed by: INTERNAL MEDICINE

## 2021-07-20 PROCEDURE — 770006 HCHG ROOM/CARE - MED/SURG/GYN SEMI*

## 2021-07-20 RX ADMIN — APIXABAN 10 MG: 5 TABLET, FILM COATED ORAL at 16:48

## 2021-07-20 RX ADMIN — OXYCODONE HYDROCHLORIDE 10 MG: 10 TABLET ORAL at 16:49

## 2021-07-20 RX ADMIN — DOCUSATE SODIUM 50 MG AND SENNOSIDES 8.6 MG 2 TABLET: 8.6; 5 TABLET, FILM COATED ORAL at 04:00

## 2021-07-20 RX ADMIN — OXYCODONE HYDROCHLORIDE 10 MG: 10 TABLET ORAL at 21:43

## 2021-07-20 RX ADMIN — OXYCODONE 5 MG: 5 TABLET ORAL at 03:56

## 2021-07-20 RX ADMIN — GABAPENTIN 400 MG: 400 CAPSULE ORAL at 11:42

## 2021-07-20 RX ADMIN — DOCUSATE SODIUM 50 MG AND SENNOSIDES 8.6 MG 2 TABLET: 8.6; 5 TABLET, FILM COATED ORAL at 16:48

## 2021-07-20 RX ADMIN — GABAPENTIN 400 MG: 400 CAPSULE ORAL at 16:48

## 2021-07-20 RX ADMIN — GABAPENTIN 400 MG: 400 CAPSULE ORAL at 04:00

## 2021-07-20 RX ADMIN — OXYCODONE HYDROCHLORIDE 10 MG: 10 TABLET ORAL at 07:56

## 2021-07-20 RX ADMIN — APIXABAN 10 MG: 5 TABLET, FILM COATED ORAL at 04:00

## 2021-07-20 RX ADMIN — OXYCODONE HYDROCHLORIDE 10 MG: 10 TABLET ORAL at 11:42

## 2021-07-20 ASSESSMENT — COGNITIVE AND FUNCTIONAL STATUS - GENERAL
SUGGESTED CMS G CODE MODIFIER DAILY ACTIVITY: CJ
CLIMB 3 TO 5 STEPS WITH RAILING: A LITTLE
HELP NEEDED FOR BATHING: A LITTLE
MOBILITY SCORE: 21
DAILY ACTIVITIY SCORE: 20
SUGGESTED CMS G CODE MODIFIER MOBILITY: CJ
WALKING IN HOSPITAL ROOM: A LITTLE
STANDING UP FROM CHAIR USING ARMS: A LITTLE
DRESSING REGULAR LOWER BODY CLOTHING: A LITTLE
TOILETING: A LITTLE
DRESSING REGULAR UPPER BODY CLOTHING: A LITTLE

## 2021-07-20 ASSESSMENT — ENCOUNTER SYMPTOMS
COUGH: 0
BACK PAIN: 1
FEVER: 0
VOMITING: 0
NAUSEA: 0
PALPITATIONS: 0
SHORTNESS OF BREATH: 1
CHILLS: 0
CONSTIPATION: 0
DIARRHEA: 0

## 2021-07-20 ASSESSMENT — LIFESTYLE VARIABLES
ALCOHOL_USE: NO
TOTAL SCORE: 0
HAVE PEOPLE ANNOYED YOU BY CRITICIZING YOUR DRINKING: NO
EVER FELT BAD OR GUILTY ABOUT YOUR DRINKING: NO
HOW MANY TIMES IN THE PAST YEAR HAVE YOU HAD 5 OR MORE DRINKS IN A DAY: 0
EVER HAD A DRINK FIRST THING IN THE MORNING TO STEADY YOUR NERVES TO GET RID OF A HANGOVER: NO
CONSUMPTION TOTAL: NEGATIVE
ON A TYPICAL DAY WHEN YOU DRINK ALCOHOL HOW MANY DRINKS DO YOU HAVE: 0
AVERAGE NUMBER OF DAYS PER WEEK YOU HAVE A DRINK CONTAINING ALCOHOL: 0
TOTAL SCORE: 0
TOTAL SCORE: 0
HAVE YOU EVER FELT YOU SHOULD CUT DOWN ON YOUR DRINKING: NO

## 2021-07-20 ASSESSMENT — PAIN DESCRIPTION - PAIN TYPE
TYPE: ACUTE PAIN

## 2021-07-20 ASSESSMENT — PATIENT HEALTH QUESTIONNAIRE - PHQ9
SUM OF ALL RESPONSES TO PHQ9 QUESTIONS 1 AND 2: 0
2. FEELING DOWN, DEPRESSED, IRRITABLE, OR HOPELESS: NOT AT ALL
1. LITTLE INTEREST OR PLEASURE IN DOING THINGS: NOT AT ALL

## 2021-07-20 ASSESSMENT — GAIT ASSESSMENTS
GAIT LEVEL OF ASSIST: SUPERVISED
DEVIATION: BRADYKINETIC
ASSISTIVE DEVICE: FRONT WHEEL WALKER
DISTANCE (FEET): 290

## 2021-07-20 ASSESSMENT — FIBROSIS 4 INDEX: FIB4 SCORE: 1.59

## 2021-07-20 NOTE — DOCUMENTATION QUERY
[facility]                                                                       Query Response Note      PATIENT:               MATA GUZMAN  ACCT #:                  [id number]  MRN:                     [id number]  :                      1945  ADMIT DATE:       2021 11:49 AM  DISCH DATE:          RESPONDING  PROVIDER #:        [id number]           QUERY TEXT:    Shortness of breath is documented in the Medical Record.  Supplemental O2 up to 6L oxymask is noted in the vitals flowsheet.  Can a diagnosis be provided to support this finding and treatment? (includes suspected or probable)    NOTE:  If an appropriate response is not listed below, please respond with a new note.    The patient's Clinical Indicators include:  Per H&P and Progress Notes: shortness of breath, hypoxemia   SpO2 93% on 5L mask,  93% 6L mask   Risk Factors: PE  Treatment:  supplemental O2, RT therapy per protocol     Thank You,  Birsa Harris RN, BSN  Clinical    [employer]  Options provided:   -- Acute respiratory failure with hypoxia   -- Respiratory Distress   -- Hypoxia   -- Findings of no clinical significance   -- Unable to determine      Query created by: Brisa Harris on 2021 10:55 AM    RESPONSE TEXT:    Acute respiratory failure with hypoxia          Electronically signed by:  ASIF RIZO MD 2021 11:09 AM

## 2021-07-20 NOTE — PROGRESS NOTES
"Spoke with patient and daughter at bedside as well as friend via phone. Discussed safe discharge plans.  Daughter plans to stay in [place] with her mother after discharge until the patient can tolerate the plane ride back to [place].  Daughter was upset that Rehab is no longer an option.  Home Health was discussed however may not be an option due to lack of Primary care to sigh orders in [place].  The option of outpatient physical and occupational therapy was discussed.  Daughter and friend had many questions regarding the hospital providing outpatient care and transportation and it was explained that after discharge the hospital will not be involved in further care or transportation to [place].  Patient and daughter encouraged to call and discuss with primary care if home health in [place] was wanted.  I also explained that her primary care would be responsible for ordering any outpatient physical therapy in [place] as well. Daughter is upset with the \"plan to discharge tomorrow!\"  I educated the daughter and patient again that we were still weaning O2 and tomorrow was just a possibility.  Patient encouraged to mobilize with staff to help alleviate fears regarding mobilization outside of hospital.        "

## 2021-07-20 NOTE — PROGRESS NOTES
Assumed care of pt, received bedside report from ANGELA Duron. Pt sitting up in bed, c/o 7/10 pain in back, 3L oxymask, A/Ox4. Fall and safety precautions in place, strip alarm in place. Discussed POC with pt, pt verbalizes understanding. No further needs at this time.

## 2021-07-20 NOTE — PROGRESS NOTES
Pt O2 needs weaned down to 3L nasal cannula, pt saturations remain between 90-94%. Pt saturations remained above 90% when ambulating on 3L. Will continue to monitor oxygen needs.

## 2021-07-20 NOTE — CARE PLAN
Problem: Pain - Standard  Goal: Alleviation of pain or a reduction in pain to the patient’s comfort goal  Outcome: Progressing     Problem: Knowledge Deficit - Standard  Goal: Patient and family/care givers will demonstrate understanding of plan of care, disease process/condition, diagnostic tests and medications  Outcome: Progressing     Problem: Fall Risk  Goal: Patient will remain free from falls  Outcome: Progressing   The patient is Watcher - Medium risk of patient condition declining or worsening    Shift Goals  Clinical Goals: Pain control  Patient Goals: Rest    Progress made toward(s) clinical / shift goals:  Pt is actively participating in care, titrated down to 3L O2 nasal cannula.     Patient is not progressing towards the following goals:

## 2021-07-20 NOTE — DISCHARGE PLANNING
Called [facility] to cancel order for walker as patient transferred to [facility] on 7/17/21 and has been there for 3 midnights.

## 2021-07-20 NOTE — CARE PLAN
Problem: Functional Transfers  Goal: STG-Within one week, patient will transfer to toilet with supervision.  Outcome: Not Met  Note: Pt d/c early to acute care     Problem: Bathing  Goal: STG-Within one week, patient will bathe with supervision using AE and DME as needed.  Outcome: Met     Problem: OT Long Term Goals  Goal: LTG-By discharge, patient will complete basic self care tasks with mod I to supervision.   Outcome: Discharged - Not Met  Note: Pt d/c early to acute care  Goal: LTG-By discharge, patient will perform bathroom transfers with mod I to supervision.  Outcome: Discharged - Not Met  Note: Pt d/c early to acute care     Problem: IADL's  Goal: STG-Within one week, patient will prepare a meal with SBA at 4WW level  Outcome: Discharged - Not Met  Note: Pt d/c early to acute care

## 2021-07-20 NOTE — DISCHARGE PLANNING
Patient transferred to [facility] on 7/17/21 and remains IP there, 3 midnights, closed from CM at [facility].

## 2021-07-20 NOTE — CARE PLAN
The patient is Stable - Low risk of patient condition declining or worsening    Shift Goals  Clinical Goals: Pain control  Patient Goals: Rest    Progress made toward(s) clinical / shift goals:  Progressing      Problem: Knowledge Deficit - Standard  Goal: Patient and family/care givers will demonstrate understanding of plan of care, disease process/condition, diagnostic tests and medications  Outcome: Progressing  Note: Pt verbalizes understanding of plan of care and has no questions at this time.     Problem: Fall Risk  Goal: Patient will remain free from falls  Outcome: Progressing  Note: Bed in lowest position and locked. Strip alarm in use. Pt wearing non-slip socks, call light within reach.

## 2021-07-20 NOTE — THERAPY
"Physical Therapy   Daily Treatment     Patient Name: Peg Louie  Age:  76 y.o., Sex:  female  Medical Record #: [id number]  Today's Date: 7/20/2021     Precautions: Fall Risk, Spinal / Back Precautions , TLSO (Thoracolumbosacral orthosis)    Assessment    Pt agreeable to PT session and mobilized as detailed below in chart demonstrating improved activity tolerance and gait. Pt now able to tolerate household distances and mobilizing without assist. Pt needing some assist for donning/doffing her TLSO brace, brace adjusted and donned per pts comfort in standing. At this time, pt declining SNF placement and not accepted at [facility], so discharge changed to home with outpatient PT services as able. Will continue to follow in this setting.     Plan    Continue current treatment plan.    DC Equipment Recommendations: Front-Wheel Walker  Discharge Recommendations: Recommend outpatient physical therapy services to address higher level deficits      Subjective    \"Wow it feels so good to walk outside of my room.\"     Objective       07/20/21 1110   Vision   Vision Comments wears glasses   Balance   Sitting Balance (Static) Fair +   Sitting Balance (Dynamic) Fair +   Standing Balance (Static) Fair +   Standing Balance (Dynamic) Fair   Weight Shift Sitting Good   Weight Shift Standing Fair   Skilled Intervention Verbal Cuing   Comments w/ FWW; no LOB   Gait Analysis   Gait Level Of Assist Supervised   Assistive Device Front Wheel Walker   Distance (Feet) 290   # of Times Distance was Traveled 2   Deviation Bradykinetic   # of Stairs Climbed 0   Weight Bearing Status FWB   Skilled Intervention Verbal Cuing   Comments w/ multiple rest breaks for SOB during gait    Bed Mobility    Supine to Sit Modified Independent   Sit to Supine Modified Independent   Scooting Modified Independent   Rolling Modified Independent   Skilled Intervention Verbal Cuing   Comments cues for log roll    Functional Mobility   Sit to Stand " Modified Independent   Bed, Chair, Wheelchair Transfer Modified Independent   Activity Tolerance   Sitting in Chair NT   Sitting Edge of Bed 5mins   Standing 15mins   Comments a few standing rest breaks, however overall improved activity tolerance   Patient / Family Goals    Patient / Family Goal #1 to go home   Short Term Goals    Short Term Goal # 1 in 6 visits patient will demo all functional transfers Orestes for safe DC home    Goal Outcome # 1 Goal met   Short Term Goal # 2 in 6 visits patient will ambulate 200' Orestes for safe DC home    Goal Outcome # 2 Goal met   Short Term Goal # 3 Pt will ambulate 200ft w/FWW and spv without needing a rest break within 6 visits in order to improve functional mobility.    Education Group   Education Provided Brace Wear and Care;Role of Physical Therapist   Role of Physical Therapist Patient Response Patient;Acceptance;Explanation;Demonstration;Verbal Demonstration   Brace Wear & Care Patient Response Patient;Acceptance;Explanation;Demonstration;Reinforcement Needed

## 2021-07-20 NOTE — PROGRESS NOTES
Attention fitting for pt's TLSO back support brace. Previous [employer] off the shelf brace is not appearing to fit the pt at this time size XXL . So brace has been deleted of charges. [facility] has been contacted as of today's date 7/20 /2021 to proceed to fit pt with a more user friendly fitting TLSO back support brace. Any questions you can contact [facility] at ext # [phone].

## 2021-07-20 NOTE — THERAPY
"Occupational Therapy  Daily Treatment     Patient Name: Peg Louie  Age:  76 y.o., Sex:  female  Medical Record #: [id number]  Today's Date: 7/20/2021     Precautions  Precautions: (P) Fall Risk, Spinal / Back Precautions , TLSO (Thoracolumbosacral orthosis)  Comments: (P) T7/8 compression fxs    Assessment    Pt seen for follow up OT tx session, pt able to don/doff TLSO appropriately with some minor adjustments made to ensure proper fit, pt mostly at a supervision level with mobility and ADLs continues to be limited by spinal precaution reminders and need for intermittent cues to maintain. Pt does not want to go to a SNF and can manage most basic tasks if provided with assistance from family/friends. Will continue to see for skilled therapy and anticipate pt will have no further needs at discharge.    Plan    Continue current treatment plan.    DC Equipment Recommendations: (P) Unable to determine at this time  Discharge Recommendations: (P) Anticipate that the patient will have no further occupational therapy needs after discharge from the hospital    Subjective    \"I don't have the urge to pee, I can go but I just dont have the urge since the injury\"     Objective       07/20/21 1120   Precautions   Precautions Fall Risk;Spinal / Back Precautions ;TLSO (Thoracolumbosacral orthosis)   Comments T7/8 compression fxs   Pain 0 - 10 Group   Therapist Pain Assessment Post Activity Pain Same as Prior to Activity;Nurse Notified   Non Verbal Descriptors   Non Verbal Scale  Calm   Cognition    Cognition / Consciousness WDL   Comments Pleasant, motivated, cooperative   Active ROM Upper Body   Active ROM Upper Body  WDL   Dominant Hand Right   Strength Upper Body   Upper Body Strength  WDL   Other Treatments   Other Treatments Provided Pt states recent loss of feeling need to urinate since back injury, can actively urinate, and has not lost bladder control   Balance   Sitting Balance (Static) Fair +   Sitting Balance " (Dynamic) Fair +   Standing Balance (Static) Fair +   Standing Balance (Dynamic) Fair   Weight Shift Sitting Good   Weight Shift Standing Fair   Skilled Intervention Verbal Cuing   Comments w/ FWW   Bed Mobility    Supine to Sit Supervised   Sit to Supine Supervised   Scooting Supervised   Rolling Supervised   Skilled Intervention Verbal Cuing   Comments cues for log roll   Activities of Daily Living   Upper Body Dressing Supervision   Lower Body Dressing   (donned shoes with no assist needed)   Toileting   (NT-refused need)   Skilled Intervention Verbal Cuing   How much help from another person does the patient currently need...   Putting on and taking off regular lower body clothing? 3   Bathing (including washing, rinsing, and drying)? 3   Toileting, which includes using a toilet, bedpan, or urinal? 3   Putting on and taking off regular upper body clothing? 3   Taking care of personal grooming such as brushing teeth? 4   Eating meals? 4   6 Clicks Daily Activity Score 20   Functional Mobility   Sit to Stand Supervised   Bed, Chair, Wheelchair Transfer Supervised   Transfer Method Stand Step   Mobility bed mobility, hallway mobility, back to bed   Skilled Intervention Verbal Cuing   Comments w/ FWW   Activity Tolerance   Sitting Edge of Bed 5   Standing 20   Patient / Family Goals   Patient / Family Goal #1 go home   Goal #1 Outcome Progressing as expected   Short Term Goals   Short Term Goal # 1 set up for TLSO don/ doff   Goal Outcome # 1 Progressing as expected   Short Term Goal # 2 set up for LB dressingt asks, appropriate use of AE   Goal Outcome # 2 Progressing as expected   Short Term Goal # 3 complete basic ADLs without need for cueing to maintain back precautions   Goal Outcome # 3 Progressing as expected   Short Term Goal # 4 tolerate 10 minutes standing at sink, with SBA, for ADL participation   Goal Outcome # 4 Progressing as expected   Education Group   Education Provided Role of Occupational  Therapist;Back Safety   Role of Occupational Therapist Patient Response Patient;Acceptance;Explanation;Demonstration;Reinforcement Needed   Back Safety Patient Response Patient;Acceptance;Explanation;Demonstration;Reinforcement Needed   Interdisciplinary Plan of Care Collaboration   IDT Collaboration with  Nursing   Patient Position at End of Therapy In Bed;Bed Alarm On;Call Light within Reach;Tray Table within Reach;Phone within Reach   Collaboration Comments RN updated

## 2021-07-20 NOTE — THERAPY
"Speech Language Pathology   Initial Assessment     Patient Name: Peg Louie  AGE:  76 y.o., SEX:  female  Medical Record #: [id number]  Today's Date: 7/20/2021     Precautions  Precautions: Fall Risk, Spinal / Back Precautions , TLSO (Thoracolumbosacral orthosis)  Comments: TLSO brace OOB    Assessment    Patient is a 75 y/o F admitted 7/17/21 with hypoxia, SOB after stopping Lovenox, found to have bilateral PE. Pt reporting new swallowing difficulties as well. Pt originally had fx T7-T8 when she fell off her camper van on a road trip. She was transferred to [facility] as neurosurgery did not recommend surgery. She then developed respiratory sx and was found to have PE. PMHx includes irregular heartbeat. CXR 7/17: No pulmonary infiltrates or consolidations are noted. CTA Chest:  No hx of SLP tx at [facility].     Clinical swallow evaluation was completed at bedside. Pt reports a long hx of GERD and new discovery of hiatal hernia. She reports food feeling \"stuck\" when she swallows, pointing to the sternal notch, which is consistent with upper esophagus. She typically compensates by sitting upright, taking small bites/sips and waiting in between bites for the bolus to pass. Positioning has been more difficult since she fractured her spine as she typically eats in bed and is unable to sit fully upright due to TLSO brace \"choking\" her when she tries to wear it. Oral mech exam was WFL. Dentition is intact. Pt was presented with ice chips, thins via tsp/cup, liquidized, soft/bite sized and regular solids. Oral phase was WFL for mastication, bolus formation and lingual transit. Pharyngeal swallow response was timely. Pt reported slow bolus transit through sternal notch area, though no regurgitation or s/sx of aspiration occurred. She appropriately compensated by waiting before taking next bite/sip and taking smaller bites/sips. Suspect esophageal  vs. oropharyngeal dysphagia. Recommend GI consult (which can be done " as an outpatient) should symptoms worsen. Pt verbalizes understanding and agreement, states she does not wish to make any changes to her diet texture and prefers to continue compensating as she has been and if her symptoms worsen, she will consult GI to address possible esophageal dysphagia. SLP will no longer actively follow. Please reconsult with any new concerns re: oropharyngeal dysphagia. If current symptoms worsen and prevent pt from eating safetly, would consult GI while inpatient.     Plan    Continue regular/thin diet, pills whole w/ thins  Consider GI consult if esophageal dysphagia symptoms worsen      Recommend Speech Therapy for Evaluation only for the following treatments:  Dysphagia Training and Patient / Family / Caregiver Education.    Discharge Recommendations: (P) Anticipate that the patient will have no further speech therapy needs after discharge from the hospital       Objective       07/20/21 0928   Oral Motor Eval    Is Patient Able to Complete Oral Motor Eval Yes, Within Normal Limits   Laryngeal Function   Voice Quality Within Functional Limits   Volutional Cough Within Functional Limits   Excursion Upon Swallow Complete   Oral Food Presentation   Ice Chips Within Functional Limits   Single Swallow Thin (0) Within Functional Limits   Liquidised (3) Within Functional Limits   Soft & Bite-Sized (6) - (Dysphagia III) Within Functional Limits   Regular (7) Within Functional Limits   Self Feeding Independent   Dysphagia Strategies / Recommendations   Compensatory Strategies Head of Bed 90 Degrees During Eating / Drinking;Other (Comment);Single Sips / Bites  (sit upright for 30-45 mins after eating)   Diet / Liquid Recommendation Regular (7);Thin (0)   Medication Administration  Whole with Liquid Wash

## 2021-07-21 PROCEDURE — 700102 HCHG RX REV CODE 250 W/ 637 OVERRIDE(OP): Performed by: STUDENT IN AN ORGANIZED HEALTH CARE EDUCATION/TRAINING PROGRAM

## 2021-07-21 PROCEDURE — 94760 N-INVAS EAR/PLS OXIMETRY 1: CPT

## 2021-07-21 PROCEDURE — 700102 HCHG RX REV CODE 250 W/ 637 OVERRIDE(OP)

## 2021-07-21 PROCEDURE — A9270 NON-COVERED ITEM OR SERVICE: HCPCS | Performed by: INTERNAL MEDICINE

## 2021-07-21 PROCEDURE — 700102 HCHG RX REV CODE 250 W/ 637 OVERRIDE(OP): Performed by: INTERNAL MEDICINE

## 2021-07-21 PROCEDURE — A9270 NON-COVERED ITEM OR SERVICE: HCPCS

## 2021-07-21 PROCEDURE — 770006 HCHG ROOM/CARE - MED/SURG/GYN SEMI*

## 2021-07-21 PROCEDURE — 99232 SBSQ HOSP IP/OBS MODERATE 35: CPT | Mod: GC | Performed by: INTERNAL MEDICINE

## 2021-07-21 PROCEDURE — A9270 NON-COVERED ITEM OR SERVICE: HCPCS | Performed by: STUDENT IN AN ORGANIZED HEALTH CARE EDUCATION/TRAINING PROGRAM

## 2021-07-21 RX ADMIN — OXYCODONE 5 MG: 5 TABLET ORAL at 04:41

## 2021-07-21 RX ADMIN — DOCUSATE SODIUM 50 MG AND SENNOSIDES 8.6 MG 2 TABLET: 8.6; 5 TABLET, FILM COATED ORAL at 04:42

## 2021-07-21 RX ADMIN — CYCLOBENZAPRINE 5 MG: 10 TABLET, FILM COATED ORAL at 17:34

## 2021-07-21 RX ADMIN — APIXABAN 10 MG: 5 TABLET, FILM COATED ORAL at 04:42

## 2021-07-21 RX ADMIN — APIXABAN 10 MG: 5 TABLET, FILM COATED ORAL at 17:34

## 2021-07-21 RX ADMIN — OXYCODONE 5 MG: 5 TABLET ORAL at 09:23

## 2021-07-21 RX ADMIN — BACLOFEN 10 MG: 10 TABLET ORAL at 09:32

## 2021-07-21 RX ADMIN — OXYCODONE 5 MG: 5 TABLET ORAL at 15:38

## 2021-07-21 RX ADMIN — GABAPENTIN 400 MG: 400 CAPSULE ORAL at 17:34

## 2021-07-21 RX ADMIN — GABAPENTIN 400 MG: 400 CAPSULE ORAL at 11:51

## 2021-07-21 RX ADMIN — GABAPENTIN 400 MG: 400 CAPSULE ORAL at 04:42

## 2021-07-21 RX ADMIN — CYCLOBENZAPRINE 5 MG: 10 TABLET, FILM COATED ORAL at 04:42

## 2021-07-21 ASSESSMENT — ENCOUNTER SYMPTOMS
DIARRHEA: 0
BACK PAIN: 1
CONSTIPATION: 0
SHORTNESS OF BREATH: 1
PALPITATIONS: 0
VOMITING: 0
FEVER: 0
NAUSEA: 0
CHILLS: 0
COUGH: 0

## 2021-07-21 ASSESSMENT — FIBROSIS 4 INDEX: FIB4 SCORE: 1.59

## 2021-07-21 ASSESSMENT — PAIN DESCRIPTION - PAIN TYPE
TYPE: ACUTE PAIN

## 2021-07-21 NOTE — DOCUMENTATION QUERY
"                                                                         [facility]                                                                       Query Response Note      PATIENT:               MATA GUZMAN  ACCT #:                  [id number]  MRN:                     [id number]  :                      1945  ADMIT DATE:       2021 11:49 AM  DISCH DATE:          RESPONDING  PROVIDER #:        [id number]           QUERY TEXT:    \"ECG with some ST depression and T inversion likely representative of hypoxia and demand ischemia\" is documented in the  Progress Note.  Demand ischemia is not documented elsewhere.  Can the diagnosis of demand ischemia be clarified?     NOTE:  If an appropriate response is not listed below, please respond with a new note.          The patient's clinical indicators include:  Per  Progress Note: ECG with some ST depression and T inversion likely representative of hypoxia and demand ischemia   Troponin T 74, 76, 61   EKG: diffuse st depressions and T wave inversions  Risk Factors: acute PE, acute respiratory failure w/ hypoxia  Treatment: tele monitoring, EKG, echo    Thank You,  Brisa Harris RN, BSN  Clinical    Connect via [employer] Messenger  Options provided:   -- Likely demand ischemia is ruled in   -- Demand ischemia ruled out   -- Unable to determine      Query created by: Brisa Harris on 2021 8:21 AM    RESPONSE TEXT:    Unable to determine          Electronically signed by:  ASIF RIZO MD 2021 8:24 AM              "

## 2021-07-21 NOTE — PROGRESS NOTES
Assumed care of pt, received bedside report from ANGELA Nick. Pt sitting up in bed, no complaints of pain, 3L O2 nasal cannula, A/Ox4. Fall and safety precautions in place, strip alarm in place. Discussed POC with pt, pt verbalizes understanding. No further needs at this time.

## 2021-07-21 NOTE — CARE PLAN
Problem: Pain - Standard  Goal: Alleviation of pain or a reduction in pain to the patient’s comfort goal  Outcome: Progressing     Problem: Knowledge Deficit - Standard  Goal: Patient and family/care givers will demonstrate understanding of plan of care, disease process/condition, diagnostic tests and medications  Outcome: Progressing     Problem: Fall Risk  Goal: Patient will remain free from falls  Outcome: Progressing   The patient is Watcher - Medium risk of patient condition declining or worsening    Shift Goals  Clinical Goals: Pain control  Patient Goals: Rest    Progress made toward(s) clinical / shift goals:  Pt is actively participating in care.     Patient is not progressing towards the following goals:

## 2021-07-21 NOTE — DISCHARGE SUMMARY
Rehab Discharge Summary    Admission Date: 7/9/2021    Discharge Date: 7/17/2021    Attending Provider: Sugey Penn MD/PhD    Admission Diagnosis:   Active Hospital Problems    Diagnosis    • *Spinal fracture of T8 vertebra (HCC)    • Hypokalemia    • Trauma    • Hypoxia    • Thrombocytopenia (HCC)    • Anemia    • Debility    • Obesity    • Hyponatremia        Discharge Diagnosis:  Active Hospital Problems    Diagnosis    • *Spinal fracture of T8 vertebra (HCC)    • Hypokalemia    • Trauma    • Hypoxia    • Thrombocytopenia (HCC)    • Anemia    • Debility    • Obesity    • Hyponatremia        HPI per H&P:  The patient is a 76 y.o. female with a past medical history of HTN, obesity, and history of arrhythmia who presented on 7/6/21 as transfer from [facility] for new T8 wedge fracture.  Patient was reportedly closing her camper door when she feel out and hit her head and back on the ground. She was found to have T8 wedge fracture with greater than 50% height loss and fracture of the tip of T7 spinous process. Patient was transferred to [facility] for higher level of care.  NSG was consulted which recommended conservative management as she had no neurologic compromise.  They recommended TLSO and follow-up in 6 weeks. TLSO to be placed when EOB. CT head and CT C spine were negative for acute process. Patient's hospital stay complicated by anemia, thrombocytopenia, and labile SBP.     Patient was admitted to [facility] on 7/9/2021.     Hospital Course by Problem List:  T8 wedge fracture - Patient with mechanical fall out of camper with T8 wedge fracture managed non-operatively. TLSO when EOB or when supine if too painful.   -PT and OT for mobility and ADLs  -Follow-up with NSG     Hypoxia - Worsening hypoxia and tachycardic, concern for PE. Patient on Heparin until ambulating > 125 feet. Transfer to ED. Found to have PE, will admit.     Pain Control - Patient on Gabapentin 100 mg BID and PRN  Oxycodone. Also on Robaxin 750 mg QID  -Add Lidocaine patches. Reduce Oxycodone to 5 mg PRN  -Change Robaxin to Baclofen 10 mg TID -> Reduce to 5 mg   -Increased Gabapentin to 300 mg TID     HTN/Arrhythmia - Patient on Atenolol 50 mg daily      Anemia - Check AM CBC - 10.2 stable. Check Fe panel - wnl. B12 normal.      Thrombocytopenia - Check AM CBC - 163, improving. 199 on 7/16/21. Resolved.     Hyponatremia  - Check AM CMP - 135, improving. Recheck 7/16 - 136, stable     Morbid Obesity due to excess calories - Patient with BMI of 37.0 on admission, meeting medical criteria. Dietitian to consult.      DVT Ppx - Patient on Heparin on transfer. Switched to Lovenox. Ambulating > 125 feet, discontinue.        Functional Status at Discharge  Eating:  Independent  Eating Description:     Grooming:  Modified Independent, Standing  Grooming Description:  Increased time (oral care, brushing hair, washing face)  Bathing:  Supervision  Bathing Description:  Increased time, Adaptive equipment, Tub bench, Grab bar, Hand held shower, Long handled bath tool  Upper Body Dressing:  Minimal Assist  Upper Body Dressing Description:  Increased time, Application of orthotic or brace (assist with adjusting TLSO)  Lower Body Dressing:  Stand by Assist  Lower Body Dressing Description:  Increased time, Set-up of equipment, Supervision for safety (don/doff slip on shoes seated EOB)  Discharge Location : [facility]  Patient Discharging with Assist of: Other (See Comments) (acute care)  Level of Supervision Required: 24 Hour Supervision  Recommended Equipment for Discharge: Grab Bars by Toilet;Shower Chair;Grab Bars in Tub / Shower  Recommended Services Upon Discharge: Other (See Comments) (acute OT care)  Long Term Goals Met: 0  Long Term Goals Not Met: 2  Reason(s) for Goals Not Met: pt d/c to acute care  Criteria for Termination of Services: Patient Transferred to Acute Care for Medical Purposes  Comments: pt was making progress  to all goals before d/c to acute care  Walk:  Stand by Assist  Distance Walked:  160 (3-4 standing rests)  Number of Times Distance Was Traveled:  2  Assistive Device:  4 Wheel Walker  Gait Deviation:  Antalgic, Bradykinetic, Decreased Heel Strike  Wheelchair:  Minimal Assist  Distance Propelled:  50   Wheelchair Description:  Assistance with steering, Extra time, Leg rest management, Supervision for safety  Stairs Stand by Assist  Stairs Description Extra time, Hand rails, Limited by fatigue, Supervision for safety, Verbal cueing  Discharge Location: [facility]  Patient Discharging with Assist of: Family  Level of Supervision Required Upon Discharge: Intermittent Supervision  Recommended Equipment for Discharge: Front-Wheeled Walker  Recommeded Services Upon Discharge: Home Health Physical Therapy;Outpatient Physical Therapy  Criteria for Termination of Services: Patient Transferred to Acute Care for Medical Purposes  Comprehension:  Modified Independent  Comprehension Description:  Glasses  Expression:  Independent  Expression Description:     Social Interaction:     Social Interaction Description:     Problem Solving:  Independent  Problem Solving Description:     Memory:  Independent  Memory Description:          Sugey BRUNER M.D., personally performed a complete drug regimen review and no potential clinically significant medication issues were identified.   Discharge Medication:     Medication List      ASK your doctor about these medications      Instructions   atenolol 50 MG Tabs  Commonly known as: TENORMIN   Take 50 mg by mouth every day. Used for irregular heartbeat  Dose: 50 mg     baclofen 5 MG Tabs  Commonly known as: Lioresal   Take 10 mg by mouth 3 times a day.  Dose: 10 mg     bisacodyl 10 MG Supp  Commonly known as: DULCOLAX   Insert 10 mg into the rectum 1 time a day as needed (If mag hydroxide is ineffective after 24 hours).  Dose: 10 mg     gabapentin 400 MG Caps  Commonly  known as: NEURONTIN   Take 400 mg by mouth 3 times a day.  Dose: 400 mg     Lidoderm 5 % Ptch  Generic drug: lidocaine   Place 2 Patches on the skin every 12 hours.  Dose: 2 Patch     magnesium hydroxide 400 MG/5ML Susp  Commonly known as: MILK OF MAGNESIA   Take 30 mL by mouth 1 time a day as needed (If PEG and docusate ineffective after 24 hours).  Dose: 30 mL     oxyCODONE immediate-release 5 MG Tabs  Commonly known as: ROXICODONE   Take 2.5-5 mg by mouth every four hours as needed for Severe Pain.  Dose: 2.5-5 mg     polyethylene glycol/lytes 17 g Pack  Commonly known as: MIRALAX   Take 1 Packet by mouth 1 time a day as needed (if sennosides and docusate ineffective after 24 hours).  Dose: 17 g     senna-docusate 8.6-50 MG Tabs  Commonly known as: PERICOLACE or SENOKOT S   Take 2 Tablets by mouth 2 times a day.  Dose: 2 tablet     traMADol 50 MG Tabs  Commonly known as: ULTRAM   Take 50 mg by mouth every 6 hours as needed (Pain scale 4-6).  Dose: 50 mg            Discharge Diet:  TBD    Discharge Activity:  TBD     Disposition:  Patient to transfer to ED.     Equipment:  TBD    Follow-up & Discharge Instructions:  Follow up with your primary care provider (PCP) within 7-10 days of discharge to review your medications and take over your care.     If you develop chest pain, fever, chills, change in neurologic function (weakness, sensation changes, vision changes), or other concerning sxs, seek immediate medical attention or call 911.      Condition on Discharge:  Guarded    Sugey Penn M.D.       This note is for documentation purposes only.

## 2021-07-21 NOTE — PROGRESS NOTES
"Daily Progress Note:     Date of Service: 7/20/2021  Primary Team: [facility] IM Orange Team   Attending: Barrett Beasley M.D.   Senior Resident: Dr. Shazia Fang  Intern: Dr. Shamar Recio  Contact:  [phone]    Chief Complaint:   Shortness of breath, back pain    Subjective:  76 year old woman with history of \"irregular heart beat\" presented on 07/17/2021 with hypoxia and shortness of breath.   She is from [place] and was on a mother-daughter road trip when she fell off the camper van fracturing her T7-T8. She originally went to [facility] where they then transferred her to [facility]. Neurosurgery did not recommend surgery and so she was then transferred to acute rehab. Records indicate patient wished to stop Lovenox on 07/15/2021. The next day the patient was found to be hypoxic with low blood pressure and lightheaded. The next day she continued to feel short of breath and tachycardic. Presented to ER where she was found to have bilateral pulmonary embolism on CTA. She was started on Lovenox and given 2L of NS.     Interval History:  Shortness of breath continues to improve. Now on 3L of NC. Has been off atenolol for 48 hours without any bothersome palpitations. Patient did not meet criteria to go back to physical rehab. Daughter and patient planning to stay in rehab until patient can tolerate plane ride back to [place]. Order to fit patient for TSLO back support submitted to [employer].      Review of Systems:    Review of Systems   Constitutional: Negative for chills and fever.   Respiratory: Positive for shortness of breath. Negative for cough.    Cardiovascular: Negative for chest pain and palpitations.   Gastrointestinal: Negative for constipation, diarrhea, nausea and vomiting.   Musculoskeletal: Positive for back pain.       Objective Data:   Physical Exam:   Vitals:   Temp:  [36 °C (96.8 °F)-37.2 °C (98.9 °F)] 37 °C (98.6 °F)  Pulse:  [] 102  Resp:  [16-18] 16  BP: (117-131)/(61-78) " 131/75  SpO2:  [93 %-96 %] 94 %    Physical Exam  Constitutional:       General: She is not in acute distress.     Appearance: She is not diaphoretic.   HENT:      Head: Normocephalic and atraumatic.   Cardiovascular:      Rate and Rhythm: Normal rate and regular rhythm.   Pulmonary:      Effort: Pulmonary effort is normal.      Breath sounds: Normal breath sounds.   Musculoskeletal:      Comments: 1+ distal pulses bilaterally, no pedal edema.       Labs:     Recent Labs     07/18/21 0226 07/19/21  0235   WBC 6.6 6.1   RBC 3.49* 3.47*   HEMOGLOBIN 9.4* 9.5*   HEMATOCRIT 30.4* 31.3*   MCV 87.1 90.2   MCH 26.9* 27.4   RDW 50.3* 51.8*   PLATELETCT 208 210   MPV 10.7 10.5   NEUTSPOLYS 60.40 55.10   LYMPHOCYTES 28.70 34.00   MONOCYTES 8.80 7.90   EOSINOPHILS 1.10 1.50   BASOPHILS 0.50 0.80     Recent Labs     07/18/21 0226 07/19/21  0235   SODIUM 137 136   POTASSIUM 4.6 4.7   CHLORIDE 102 102   CO2 27 27   GLUCOSE 93 95   BUN 17 15         Imaging:   CTA pulmonary artery showed multiple bilateral lobar and segmental emboli. RV/LV ratio elevated @ 1.3 consistent with right heart strain. Patchy atelectasis/pleural effusion. Indeterminate 4 mm nodule within right upper lobe inferior. Scarring atelectasis.     Bilateral lower extremity venous doppler 07/18/2021: Right lower extremity - Evidence of acute occlusive deep vein thrombosis in the right peroneal veins.    All other vessels are demonstrated complete color filling and    compressibility with normal venous flow dynamics including spontaneous    flow, response to augmentation maneuvers, and respiratory phasicity.       Left lower extremity -.    Evidence of acute occlusive deep vein thrombosis in the one of the paired    peroneal veins.    All other vessles are demonstrated complete color filling and    compressibility with normal venous flow dynamics including spontaneous    flow, response to augmentation maneuvers, and respiratory phasicity.    Echocardiogram  07/18/2021:  No prior study is available for comparison.   Left ventricular ejection fraction is visually estimated to be 60%.  Unable to estimate pulmonary artery pressure due to an inadequate   tricuspid regurgitant jet.        Chest x-ray showed enlarged cardiac silhouette with bibasilar atelectasis.     ECG -7/18/2021, per my read, was sinus, ~97 bpm, normal axis, slight ST depression (1-2mm) in II,III, v2-v5.       Problem Representation:  76 year old woman visiting from [place] who sustained T7-T8 fracture after falling off [employer] van while on road trip with her daughter who was then found to have submassive pulmonary embolism during acute rehab.      * Acute pulmonary embolism (HCC)- (present on admission)  Assessment & Plan  Multiple bilateral lobar and segmental emboli with SBP >90mmHG categorize patient as having a submassive pulmonary embolism. Likely related to debility secondary to T7-T8 fracture. No family history of blood clots, no history of blood clots or hormone replacement therapy. On apixaban 10 mg bid for 5 days total before transitioning to apixaban 5 mg BID for 6 months. Shortness of breath improving even on 3L of NC.  Plan:  - On apixaban 10 mg BID since 07/19/2021 for 5 days total until end of 07/23/2021, then on 07/24/2021 transition to apixaban 5 mg BID after for minimum of 6 months.   - O2 as needed, continue trying to ween down, keep SpO2 >92%  - Continue using incentive spirometer hourly.      Debility- (present on admission)  Assessment & Plan  PMR recommended home with outpatient rehab vs. Skilled nursing facility, daughter and patient refused skilled nursing facility.Will need to schedule outpatient rehab with PCP when back in [place].    Spinal fracture of T8 vertebra (HCC)- (present on admission)  Assessment & Plan  Secondary to fall, recent fracture  Transferred from acute rehab center    Spinal fracture of T7 vertebra (HCC)- (present on admission)  Assessment & Plan  Secondary to  fall, recent fracture on 07/05/2021. Did not meet criteria to go back to physical rehab. Will need to schedule outpatient rehab when back in [place].  - On baclofen, gabapentin and PRN oxycodone.  - Alternate flexeril PRN with baclofen PRN.      Palpitations  Assessment & Plan  Stopped atenolol to avoid masking pulmonary embolism related tachycardia. No recent palpitations. Patient feels anxious about not taking atenolol. If anxiety persists, will consider anti-anxiety medication over beta blocker.     Anemia- (present on admission)  Assessment & Plan  Between 9.4 to 10.4 for past 2 weeks. No further records.  - Monitor with CBC

## 2021-07-21 NOTE — PROGRESS NOTES
Received report from ANGELA Washington. Reviewed POC, no questions at this time. Call light is within reach, bed is in lowest/locked position.

## 2021-07-22 PROCEDURE — 99231 SBSQ HOSP IP/OBS SF/LOW 25: CPT | Mod: GC | Performed by: INTERNAL MEDICINE

## 2021-07-22 PROCEDURE — 97116 GAIT TRAINING THERAPY: CPT | Mod: CQ

## 2021-07-22 PROCEDURE — 97530 THERAPEUTIC ACTIVITIES: CPT | Mod: CQ

## 2021-07-22 PROCEDURE — A9270 NON-COVERED ITEM OR SERVICE: HCPCS | Performed by: STUDENT IN AN ORGANIZED HEALTH CARE EDUCATION/TRAINING PROGRAM

## 2021-07-22 PROCEDURE — 700102 HCHG RX REV CODE 250 W/ 637 OVERRIDE(OP): Performed by: STUDENT IN AN ORGANIZED HEALTH CARE EDUCATION/TRAINING PROGRAM

## 2021-07-22 PROCEDURE — A9270 NON-COVERED ITEM OR SERVICE: HCPCS

## 2021-07-22 PROCEDURE — 97535 SELF CARE MNGMENT TRAINING: CPT | Mod: CQ

## 2021-07-22 PROCEDURE — 97535 SELF CARE MNGMENT TRAINING: CPT | Mod: CO

## 2021-07-22 PROCEDURE — 700102 HCHG RX REV CODE 250 W/ 637 OVERRIDE(OP): Performed by: INTERNAL MEDICINE

## 2021-07-22 PROCEDURE — 94760 N-INVAS EAR/PLS OXIMETRY 1: CPT

## 2021-07-22 PROCEDURE — A9270 NON-COVERED ITEM OR SERVICE: HCPCS | Performed by: INTERNAL MEDICINE

## 2021-07-22 PROCEDURE — 700102 HCHG RX REV CODE 250 W/ 637 OVERRIDE(OP)

## 2021-07-22 PROCEDURE — 770006 HCHG ROOM/CARE - MED/SURG/GYN SEMI*

## 2021-07-22 RX ADMIN — OXYCODONE 5 MG: 5 TABLET ORAL at 11:58

## 2021-07-22 RX ADMIN — DOCUSATE SODIUM 50 MG AND SENNOSIDES 8.6 MG 2 TABLET: 8.6; 5 TABLET, FILM COATED ORAL at 04:28

## 2021-07-22 RX ADMIN — BACLOFEN 10 MG: 10 TABLET ORAL at 03:27

## 2021-07-22 RX ADMIN — APIXABAN 10 MG: 5 TABLET, FILM COATED ORAL at 04:27

## 2021-07-22 RX ADMIN — OXYCODONE 5 MG: 5 TABLET ORAL at 04:27

## 2021-07-22 RX ADMIN — OXYCODONE 5 MG: 5 TABLET ORAL at 00:41

## 2021-07-22 RX ADMIN — DOCUSATE SODIUM 50 MG AND SENNOSIDES 8.6 MG 2 TABLET: 8.6; 5 TABLET, FILM COATED ORAL at 18:00

## 2021-07-22 RX ADMIN — BACLOFEN 10 MG: 10 TABLET ORAL at 18:01

## 2021-07-22 RX ADMIN — OXYCODONE 5 MG: 5 TABLET ORAL at 16:42

## 2021-07-22 RX ADMIN — GABAPENTIN 400 MG: 400 CAPSULE ORAL at 04:27

## 2021-07-22 RX ADMIN — GABAPENTIN 400 MG: 400 CAPSULE ORAL at 18:00

## 2021-07-22 RX ADMIN — APIXABAN 10 MG: 5 TABLET, FILM COATED ORAL at 18:01

## 2021-07-22 RX ADMIN — GABAPENTIN 400 MG: 400 CAPSULE ORAL at 11:58

## 2021-07-22 RX ADMIN — OXYCODONE 5 MG: 5 TABLET ORAL at 08:09

## 2021-07-22 RX ADMIN — CYCLOBENZAPRINE 5 MG: 10 TABLET, FILM COATED ORAL at 08:09

## 2021-07-22 ASSESSMENT — ENCOUNTER SYMPTOMS
FEVER: 0
COUGH: 0
NAUSEA: 0
CHILLS: 0
SHORTNESS OF BREATH: 1
VOMITING: 0
CONSTIPATION: 0
BACK PAIN: 1
PALPITATIONS: 0
DIARRHEA: 0

## 2021-07-22 ASSESSMENT — PAIN DESCRIPTION - PAIN TYPE
TYPE: ACUTE PAIN

## 2021-07-22 ASSESSMENT — COGNITIVE AND FUNCTIONAL STATUS - GENERAL
SUGGESTED CMS G CODE MODIFIER DAILY ACTIVITY: CH
MOBILITY SCORE: 24
DAILY ACTIVITIY SCORE: 24
SUGGESTED CMS G CODE MODIFIER MOBILITY: CH

## 2021-07-22 ASSESSMENT — GAIT ASSESSMENTS
DISTANCE (FEET): 400
ASSISTIVE DEVICE: FRONT WHEEL WALKER
GAIT LEVEL OF ASSIST: SUPERVISED
DEVIATION: BRADYKINETIC

## 2021-07-22 NOTE — CARE PLAN
Problem: Pain - Standard  Goal: Alleviation of pain or a reduction in pain to the patient’s comfort goal  Outcome: Progressing     Problem: Fall Risk  Goal: Patient will remain free from falls  Outcome: Progressing     The patient is Stable - Low risk of patient condition declining or worsening    Shift Goals  Clinical Goals: Pain control  Patient Goals: Rest    Progress made toward(s) clinical / shift goals:  Fall precautions in place. Bed in lowest position. Non-skid socks in place. Personal possessions within reach. Mobility sign on door. Bed-alarm on. Call light within reach. Pt educated regarding fall prevention and states understanding.   Pt's pain will remain under control, at or below a 5 by end of shift.     Patient is not progressing towards the following goals:

## 2021-07-22 NOTE — THERAPY
Occupational Therapy  Daily Treatment     Patient Name: Peg Louie  Age:  76 y.o., Sex:  female  Medical Record #: [id number]  Today's Date: 7/22/2021       Precautions: Fall Risk, Spinal / Back Precautions , TLSO (Thoracolumbosacral orthosis)  Comments: T7/8 compression fxs    Assessment    Pt seen for OT tx. Thorough discussion w/ pt regarding completing ADLs and ADL transfers while maintaining spinal precautions. Pt reports that she will have assistance w/ ADLs as needed upon appropriate medical d/c. Pt able to direct care on how to don/doff brace appropriately. Pt able to verbalize spinal precautions appropriately.       Plan    DC Equipment Recommendations: Unable to determine at this time  Discharge Recommendations: Anticipate that the patient will have no further occupational therapy needs after discharge from the hospital       07/22/21 1125   Balance   Sitting Balance (Static) Fair +   Sitting Balance (Dynamic) Fair +   Standing Balance (Static) Fair +   Standing Balance (Dynamic) Fair   Weight Shift Sitting Good   Weight Shift Standing Fair   Bed Mobility    Supine to Sit Supervised   Sit to Supine Supervised   Scooting Supervised   Rolling Supervised   Activities of Daily Living   Grooming Supervision;Standing   Upper Body Dressing Minimal Assist   Lower Body Dressing Minimal Assist   Toileting Supervision   Functional Mobility   Sit to Stand Supervised   Bed, Chair, Wheelchair Transfer Supervised   Toilet Transfers Supervised   Short Term Goals   Short Term Goal # 1 set up for TLSO don/ doff   Goal Outcome # 1 Progressing as expected   Short Term Goal # 2 set up for LB dressingt asks, appropriate use of AE   Goal Outcome # 2 Progressing as expected   Short Term Goal # 3 complete basic ADLs without need for cueing to maintain back precautions   Goal Outcome # 3 Goal met   Short Term Goal # 4 tolerate 10 minutes standing at sink, with SBA, for ADL participation   Goal Outcome # 4 Goal met    Anticipated Discharge Equipment and Recommendations   DC Equipment Recommendations Unable to determine at this time   Discharge Recommendations Anticipate that the patient will have no further occupational therapy needs after discharge from the hospital

## 2021-07-22 NOTE — PROGRESS NOTES
"Daily Progress Note:     Date of Service: 7/21/2021  Primary Team: [facility] Team   Attending: Barrett Beasley M.D.   Senior Resident: Dr. Shazia Fang  Intern: Dr. Shamar Recio  Contact:  [phone]    Chief Complaint:   Shortness of breath, back pain    Subjective:  76 year old woman with history of \"irregular heart beat\" presented on 07/17/2021 with hypoxia and shortness of breath.   She is from [place] and was on a mother-daughter road trip when she fell off the camper van fracturing her T7-T8. She originally went to [facility] where they then transferred her to [facility]. Neurosurgery did not recommend surgery and so she was then transferred to acute rehab. Records indicate patient wished to stop Lovenox on 07/15/2021. The next day the patient was found to be hypoxic with low blood pressure and lightheaded. The next day she continued to feel short of breath and tachycardic. Presented to ER where she was found to have bilateral pulmonary embolism on CTA. She was started on Lovenox and given 2L of NS.     Interval History:  Improved shortness of breath on 3L NC. Continuing to wean off oxygen. Patient had TSLO brace adjusted.     Review of Systems:    Review of Systems   Constitutional: Negative for chills and fever.   Respiratory: Positive for shortness of breath. Negative for cough.    Cardiovascular: Negative for chest pain and palpitations.   Gastrointestinal: Negative for constipation, diarrhea, nausea and vomiting.   Musculoskeletal: Positive for back pain.       Objective Data:   Physical Exam:   Vitals:   Temp:  [35.9 °C (96.7 °F)-36.4 °C (97.5 °F)] 36.4 °C (97.5 °F)  Pulse:  [] 99  Resp:  [17-18] 18  BP: (113-142)/(64-86) 142/86  SpO2:  [92 %-99 %] 92 %    Physical Exam  Constitutional:       General: She is not in acute distress.     Appearance: She is not diaphoretic.   HENT:      Head: Normocephalic and atraumatic.   Cardiovascular:      Rate and Rhythm: Normal rate and regular " rhythm.   Pulmonary:      Effort: Pulmonary effort is normal.      Breath sounds: Normal breath sounds.   Musculoskeletal:      Comments: 1+ distal pulses bilaterally, no pedal edema.       Labs:     Recent Labs     07/19/21  0235   WBC 6.1   RBC 3.47*   HEMOGLOBIN 9.5*   HEMATOCRIT 31.3*   MCV 90.2   MCH 27.4   RDW 51.8*   PLATELETCT 210   MPV 10.5   NEUTSPOLYS 55.10   LYMPHOCYTES 34.00   MONOCYTES 7.90   EOSINOPHILS 1.50   BASOPHILS 0.80     Recent Labs     07/19/21  0235   SODIUM 136   POTASSIUM 4.7   CHLORIDE 102   CO2 27   GLUCOSE 95   BUN 15         Imaging:   CTA pulmonary artery showed multiple bilateral lobar and segmental emboli. RV/LV ratio elevated @ 1.3 consistent with right heart strain. Patchy atelectasis/pleural effusion. Indeterminate 4 mm nodule within right upper lobe inferior. Scarring atelectasis.     Bilateral lower extremity venous doppler 07/18/2021: Right lower extremity - Evidence of acute occlusive deep vein thrombosis in the right peroneal veins.    All other vessels are demonstrated complete color filling and    compressibility with normal venous flow dynamics including spontaneous    flow, response to augmentation maneuvers, and respiratory phasicity.       Left lower extremity -.    Evidence of acute occlusive deep vein thrombosis in the one of the paired    peroneal veins.    All other vessles are demonstrated complete color filling and    compressibility with normal venous flow dynamics including spontaneous    flow, response to augmentation maneuvers, and respiratory phasicity.    Echocardiogram 07/18/2021:  No prior study is available for comparison.   Left ventricular ejection fraction is visually estimated to be 60%.  Unable to estimate pulmonary artery pressure due to an inadequate   tricuspid regurgitant jet.        Chest x-ray showed enlarged cardiac silhouette with bibasilar atelectasis.     ECG -7/18/2021, per my read, was sinus, ~97 bpm, normal axis, slight ST depression  (1-2mm) in II,III, v2-v5.       Problem Representation:  76 year old woman visiting from [place] who sustained T7-T8 fracture after falling off [employer] van while on road trip with her daughter who was then found to have submassive pulmonary embolism during acute rehab.      * Acute pulmonary embolism (HCC)- (present on admission)  Assessment & Plan  Multiple bilateral lobar and segmental emboli with SBP >90mmHG categorize patient as having a submassive pulmonary embolism. Likely related to debility secondary to T7-T8 fracture. No family history of blood clots, no history of blood clots or hormone replacement therapy. On apixaban 10 mg bid for 5 days total before transitioning to apixaban 5 mg BID for 6 months. Shortness of breath improving on 3L of NC.  Plan:  - On apixaban 10 mg BID since 07/19/2021 for 5 days total until end of 07/23/2021, then on 07/24/2021 transition to apixaban 5 mg BID after for minimum of 6 months.   - O2 as needed, continue trying to ween down, keep SpO2 >92%  - Continue using incentive spirometer hourly.  - Will need to be weaned down off oxygen or obtain home o2/concetrator before discharge.      Debility- (present on admission)  Assessment & Plan  PMR recommended home with outpatient rehab vs. Skilled nursing facility, daughter and patient refused skilled nursing facility.Will need to schedule outpatient rehab with PCP when back in [place].  - DME 4 wheel walker ordered.    Spinal fracture of T8 vertebra (HCC)- (present on admission)  Assessment & Plan  Secondary to fall, recent fracture  Transferred from acute rehab center    Spinal fracture of T7 vertebra (HCC)- (present on admission)  Assessment & Plan  Secondary to fall, recent fracture on 07/05/2021. Did not meet criteria to go back to physical rehab. Will need to schedule outpatient rehab when back in [place].  - On baclofen, gabapentin and PRN oxycodone.  - Alternate flexeril PRN with baclofen PRN.      Palpitations  Assessment &  Plan  Stopped atenolol to avoid masking pulmonary embolism related tachycardia. No recent palpitations. Patient feels anxious about not taking atenolol. If anxiety persists, will consider anti-anxiety medication over beta blocker.     Anemia- (present on admission)  Assessment & Plan  Between 9.4 to 10.4 for past 2 weeks. No further records.  - Monitor with CBC

## 2021-07-22 NOTE — DISCHARGE PLANNING
Received Choice form at 1543  Agency/Facility Name: [facility]  Referral sent per Choice form @ 7755    LSW informed

## 2021-07-22 NOTE — FACE TO FACE
Face to Face Note  -  Durable Medical Equipment    Shazia Fang D.O. - NPI: [id number]  I certify that this patient is under my care and that they had a durable medical equipment(DME)face to face encounter by myself and the clinical nurse specialist working collaboratively with me that meets the physician DME face-to-face encounter requirements with this patient on:    Date of encounter:   Patient:                    MRN:                       YOB: 2019  Peg Louie  [id number]  1945     The encounter with the patient was in whole, or in part, for the following medical condition, which is the primary reason for durable medical equipment:  Other - Closed Thoracic Fractures    I certify that, based on my findings, the following durable medical equipment is medically necessary:  Walkers.    HOME O2 Saturation Measurements:(Values must be present for Home Oxygen orders)  Room air sat at rest: 85  Room air sat with amb: 85  With liters of O2: 3, O2 sat at rest with O2: 0  With Liters of O2: 3, O2 sat with amb with O2 : 94  Is the patient mobile?: Yes    My Clinical findings support the need for the above equipment due to:  Other - Closed Thoracic Fractures    Supporting Symptoms: Need for supportive aids with ambulation

## 2021-07-22 NOTE — FACE TO FACE
"Face to Face Note  -  Durable Medical Equipment    Shazia Fang D.O. - NPI: [id number]  I certify that this patient is under my care and that they had a durable medical equipment(DME)face to face encounter by myself that meets the physician DME face-to-face encounter requirements with this patient on:    Date of encounter:   Patient:                    MRN:                       YOB: 2019  Peg Louie  [id number]  1945     The encounter with the patient was in whole, or in part, for the following medical condition, which is the primary reason for durable medical equipment:  Other - Pulmonary Embolism    I certify that, based on my findings, the following durable medical equipment is medically necessary:  Oxygen and Walkers.    HOME O2 Saturation Measurements:(Values must be present for Home Oxygen orders)         ,     ,         My Clinical findings support the need for the above equipment due to:  Hypoxia, Other - Fractures    Supporting Symptoms: The patient requires supplemental oxygen, as the following interventions have been tried with limited or no improvement: \"Ambulation with oximetry , patient recently had thoracic fractures for which she needs ambulatory aids such as a four wheel walker  If patient feels more short of breath, they can go up to 6 liters per minute and contact healthcare provider.  "

## 2021-07-22 NOTE — FACE TO FACE
"Face to Face Note  -  Durable Medical Equipment    Shazia Fang D.O. - NPI: [id number]  I certify that this patient is under my care and that they had a durable medical equipment(DME)face to face encounter by myself and the clinical nurse specialist working collaboratively with me that meets the physician DME face-to-face encounter requirements with this patient on:    Date of encounter:   Patient:                    MRN:                       YOB: 2019  Peg Louie  [id number]  1945     The encounter with the patient was in whole, or in part, for the following medical condition, which is the primary reason for durable medical equipment:  Other - Pulmonary Embolism    I certify that, based on my findings, the following durable medical equipment is medically necessary:  Oxygen.    HOME O2 Saturation Measurements:(Values must be present for Home Oxygen orders)  Room air sat at rest: 85  Room air sat with amb: 85  With liters of O2: 3, O2 sat at rest with O2: 0  With Liters of O2: 3, O2 sat with amb with O2 : 94  Is the patient mobile?: Yes    My Clinical findings support the need for the above equipment due to:  Hypoxia    Supporting Symptoms: The patient requires supplemental oxygen, as the following interventions have been tried with limited or no improvement: \"Ambulation with oximetry    If patient feels more short of breath, they can go up to 6 liters per minute and contact healthcare provider.  "

## 2021-07-22 NOTE — PROGRESS NOTES
Received report from ANGELA Washington. Reviewed POC, no questions at this time. Call light is within reach, bed is in lowest/locked position.   Felix

## 2021-07-22 NOTE — THERAPY
Physical Therapy   Daily Treatment     Patient Name: Peg Louie  Age:  76 y.o., Sex:  female  Medical Record #: [id number]  Today's Date: 7/22/2021     Precautions: Fall Risk, Spinal / Back Precautions , TLSO (Thoracolumbosacral orthosis)    Assessment    Pt was pleasant and agreeable to therapy session, she was able to teach back spinal precautions and donned brace with minimal assist. She increased gait distance with fww and spv. Educated on taking time, pacing with activity. No rest breaks were required. Educated pt and daughter regarding increasing sitting tolerance, RPE, energy conservation technique. Daughter had multiple questions regarding discharge and answered any questions at this time. Encouraged to continue to mobilize with nursing staff.     Plan    Discharge secondary to goals met.    DC Equipment Recommendations: Front-Wheel Walker  Discharge Recommendations: Recommend outpatient physical therapy services to address higher level deficits         07/22/21 1023   Other Treatments   Other Treatments Provided Education and conversation regarding pacing, use of fww, improving sitting tolerance, activity modification, spinal precautions with pt and daughter    Balance   Sitting Balance (Static) Fair +   Sitting Balance (Dynamic) Fair +   Standing Balance (Static) Fair +   Standing Balance (Dynamic) Fair   Weight Shift Sitting Good   Weight Shift Standing Fair   Skilled Intervention Verbal Cuing   Comments with fww   Gait Analysis   Gait Level Of Assist Supervised   Assistive Device Front Wheel Walker   Distance (Feet) 400   # of Times Distance was Traveled 1   Deviation Bradykinetic   Skilled Intervention Verbal Cuing;Sequencing   Comments spv with no lob, educated on taking time in between    Bed Mobility    Supine to Sit Supervised   Sit to Supine Supervised   Scooting Supervised   Rolling Supervised   Comments log rolling technique    Functional Mobility   Sit to Stand Supervised   Bed, Chair,  Wheelchair Transfer Supervised   Skilled Intervention Verbal Cuing   Comments with fww, vc for proper hand placement    Short Term Goals    Short Term Goal # 3 Pt will ambulate 200ft w/FWW and spv without needing a rest break within 6 visits in order to improve functional mobility.    Goal Outcome # 3 Goal met

## 2021-07-22 NOTE — PROGRESS NOTES
"Daily Progress Note:     Date of Service: 7/22/2021  Primary Team: [facility] Team   Attending: Barrett Beasley M.D.   Senior Resident: Dr. Shazia Fagn  Intern: Dr. Shamar Recio  Contact:  [phone]    Chief Complaint:   Shortness of breath, back pain    Subjective:  76 year old woman with history of \"irregular heart beat\" presented on 07/17/2021 with hypoxia and shortness of breath. She is from [place] and was on a mother-daughter road trip when she fell off the camper van fracturing her T7-T8. She originally went to [facility] where they then transferred her to [facility]. Neurosurgery did not recommend surgery and so she was then transferred to acute rehab. Records indicate patient wished to stop Lovenox on 07/15/2021. The next day the patient was found to be hypoxic with low blood pressure and lightheaded. The next day she continued to feel short of breath and tachycardic. Presented to ER where she was found to have multiple bilateral lobar and segmental pulmonary emboli.     Interval History:  No acute events overnight. Oxygen and Walker orders placed - patient requiring 3L of Oxygen with ambulation which will require 4L of oxygen during flight, if she wishes to pursue flying back to [place] in the near future.     Review of Systems:    Review of Systems   Constitutional: Negative for chills and fever.   Respiratory: Positive for shortness of breath. Negative for cough.    Cardiovascular: Negative for chest pain and palpitations.   Gastrointestinal: Negative for constipation, diarrhea, nausea and vomiting.   Musculoskeletal: Positive for back pain.       Objective Data:   Physical Exam:   Vitals:   Temp:  [36.2 °C (97.1 °F)-36.9 °C (98.4 °F)] 36.2 °C (97.1 °F)  Pulse:  [] 90  Resp:  [16-18] 16  BP: (112-142)/(71-86) 112/77  SpO2:  [92 %-96 %] 93 %    Physical Exam  Constitutional:       General: She is not in acute distress.     Appearance: She is not diaphoretic.   HENT:      Head: " Normocephalic and atraumatic.   Cardiovascular:      Rate and Rhythm: Normal rate and regular rhythm.   Pulmonary:      Effort: Pulmonary effort is normal.      Breath sounds: Normal breath sounds.   Musculoskeletal:      Comments: 1+ distal pulses bilaterally, no pedal edema.       Labs:     No results for input(s): WBC, RBC, HEMOGLOBIN, HEMATOCRIT, MCV, MCH, RDW, PLATELETCT, MPV, NEUTSPOLYS, LYMPHOCYTES, MONOCYTES, EOSINOPHILS, BASOPHILS, RBCMORPHOLO in the last 72 hours.  No results for input(s): SODIUM, POTASSIUM, CHLORIDE, CO2, GLUCOSE, BUN, CPKTOTAL in the last 72 hours.      Imaging:   CTA pulmonary artery showed multiple bilateral lobar and segmental emboli. RV/LV ratio elevated @ 1.3 consistent with right heart strain. Patchy atelectasis/pleural effusion. Indeterminate 4 mm nodule within right upper lobe inferior. Scarring atelectasis.     Bilateral lower extremity venous doppler 07/18/2021: Right lower extremity - Evidence of acute occlusive deep vein thrombosis in the right peroneal veins.    All other vessels are demonstrated complete color filling and    compressibility with normal venous flow dynamics including spontaneous    flow, response to augmentation maneuvers, and respiratory phasicity.       Left lower extremity -.    Evidence of acute occlusive deep vein thrombosis in the one of the paired    peroneal veins.    All other vessles are demonstrated complete color filling and    compressibility with normal venous flow dynamics including spontaneous    flow, response to augmentation maneuvers, and respiratory phasicity.    Echocardiogram 07/18/2021:  No prior study is available for comparison.   Left ventricular ejection fraction is visually estimated to be 60%.  Unable to estimate pulmonary artery pressure due to an inadequate   tricuspid regurgitant jet.        Chest x-ray showed enlarged cardiac silhouette with bibasilar atelectasis.     ECG -7/18/2021, per my read, was sinus, ~97 bpm, normal  axis, slight ST depression (1-2mm) in II,III, v2-v5.       Problem Representation:  76 year old woman visiting from [place] who sustained T7-T8 fracture after falling off camper van while on road trip with her daughter who was then found to have submassive pulmonary embolism during acute rehab.      * Acute pulmonary embolism (HCC)- (present on admission)  Assessment & Plan  CTA showed bilateral lobar and segmental pulmonary emboli. RV/LV ratio 1.3. Hemodynamically stable on presentation.   Systolic pressure >90mmHg with signs of RV dysfunction (CT and ECG) and myocardial necrosis (Trop T at 61) categorize patient as having a submassive pulmonary embolism.   - Goal SpO2 >94%, currently on 3L of SpO2  - On Apixaban - 10mg BID, with plans to transition to 5mg PO BID on 7/24/2021    Spinal fracture of T8 vertebra (HCC)- (present on admission)  Assessment & Plan  Non-surgical - continues to ambulate and PT/OT during inpatient stay

## 2021-07-22 NOTE — DISCHARGE PLANNING
Anticipated Discharge Disposition: Home with O2    Action: LSW spoke to patient's daughter at bedside. Daughter wanting to verify CM January passed on information. LSW confirmed January did pass on information.     Daughter wanting patient to have HH while in [place] for a week. LSW to look into if this is an option. LSW to also look into what kind of transport patient needs as daughter is requesting assistance with transport at [place]. LSW to also speak to [facility] about 4ww they ordered.     LSW spoke to liaison Te from [facility]  who reports that patient would not be able to get HH for a week while in [place] due to medicare CMS guidelines. Te can assist with explaining to patient and daughter if needed.    LSW spoke to Colleen at [facility] who reports that a 4 wheel walker was ordered through [employer] but order was canceled when patient was admitted to hospital. LSW to have MD reorder.     LSW left a VM for Maryanne MILLER CM @ [facility] requesting a call back to see if patient and daughter can still get family training.    LSW to work on O2 once orders are received.     Barriers to Discharge: O2 needs, 4ww needed, transport    Plan: LSW to continue coordinating DME and O2 needs.     1500: LSW met with patient at bedside and got choice for walker and O2 for [facility]ria. LSW explained that organizing O2 to travel from [facility] to [place] might take some time. Patient verbalized understanding.     LSW also spoke to Maryanne from rehab that states that patient cannot get family training from their therapists but [facility] PT/OT may be able to assist with that.

## 2021-07-23 LAB
ANION GAP SERPL CALC-SCNC: 8 MMOL/L (ref 7–16)
BASOPHILS # BLD AUTO: 0.9 % (ref 0–1.8)
BASOPHILS # BLD: 0.03 K/UL (ref 0–0.12)
BUN SERPL-MCNC: 14 MG/DL (ref 8–22)
CALCIUM SERPL-MCNC: 9.1 MG/DL (ref 8.5–10.5)
CHLORIDE SERPL-SCNC: 100 MMOL/L (ref 96–112)
CO2 SERPL-SCNC: 29 MMOL/L (ref 20–33)
CREAT SERPL-MCNC: 0.73 MG/DL (ref 0.5–1.4)
EOSINOPHIL # BLD AUTO: 0.07 K/UL (ref 0–0.51)
EOSINOPHIL NFR BLD: 1.8 % (ref 0–6.9)
ERYTHROCYTE [DISTWIDTH] IN BLOOD BY AUTOMATED COUNT: 51.6 FL (ref 35.9–50)
GLUCOSE SERPL-MCNC: 112 MG/DL (ref 65–99)
HCT VFR BLD AUTO: 32.1 % (ref 37–47)
HGB BLD-MCNC: 9.6 G/DL (ref 12–16)
LYMPHOCYTES # BLD AUTO: 1.15 K/UL (ref 1–4.8)
LYMPHOCYTES NFR BLD: 30.3 % (ref 22–41)
MANUAL DIFF BLD: NORMAL
MCH RBC QN AUTO: 26.5 PG (ref 27–33)
MCHC RBC AUTO-ENTMCNC: 29.9 G/DL (ref 33.6–35)
MCV RBC AUTO: 88.7 FL (ref 81.4–97.8)
METAMYELOCYTES NFR BLD MANUAL: 0.9 %
MONOCYTES # BLD AUTO: 0.14 K/UL (ref 0–0.85)
MONOCYTES NFR BLD AUTO: 3.6 % (ref 0–13.4)
MORPHOLOGY BLD-IMP: NORMAL
NEUTROPHILS # BLD AUTO: 2.38 K/UL (ref 2–7.15)
NEUTROPHILS NFR BLD: 58 % (ref 44–72)
NEUTS BAND NFR BLD MANUAL: 4.5 % (ref 0–10)
NRBC # BLD AUTO: 0 K/UL
NRBC BLD-RTO: 0 /100 WBC
PLATELET # BLD AUTO: 252 K/UL (ref 164–446)
PLATELET BLD QL SMEAR: NORMAL
PMV BLD AUTO: 10.3 FL (ref 9–12.9)
POTASSIUM SERPL-SCNC: 4 MMOL/L (ref 3.6–5.5)
RBC # BLD AUTO: 3.62 M/UL (ref 4.2–5.4)
RBC BLD AUTO: NORMAL
SODIUM SERPL-SCNC: 137 MMOL/L (ref 135–145)
WBC # BLD AUTO: 3.8 K/UL (ref 4.8–10.8)

## 2021-07-23 PROCEDURE — 770006 HCHG ROOM/CARE - MED/SURG/GYN SEMI*

## 2021-07-23 PROCEDURE — 700102 HCHG RX REV CODE 250 W/ 637 OVERRIDE(OP): Performed by: STUDENT IN AN ORGANIZED HEALTH CARE EDUCATION/TRAINING PROGRAM

## 2021-07-23 PROCEDURE — 700102 HCHG RX REV CODE 250 W/ 637 OVERRIDE(OP): Performed by: INTERNAL MEDICINE

## 2021-07-23 PROCEDURE — 85027 COMPLETE CBC AUTOMATED: CPT

## 2021-07-23 PROCEDURE — 85007 BL SMEAR W/DIFF WBC COUNT: CPT

## 2021-07-23 PROCEDURE — 99232 SBSQ HOSP IP/OBS MODERATE 35: CPT | Mod: GC | Performed by: INTERNAL MEDICINE

## 2021-07-23 PROCEDURE — 700102 HCHG RX REV CODE 250 W/ 637 OVERRIDE(OP)

## 2021-07-23 PROCEDURE — A9270 NON-COVERED ITEM OR SERVICE: HCPCS | Performed by: INTERNAL MEDICINE

## 2021-07-23 PROCEDURE — A9270 NON-COVERED ITEM OR SERVICE: HCPCS | Performed by: STUDENT IN AN ORGANIZED HEALTH CARE EDUCATION/TRAINING PROGRAM

## 2021-07-23 PROCEDURE — A9270 NON-COVERED ITEM OR SERVICE: HCPCS

## 2021-07-23 PROCEDURE — 36415 COLL VENOUS BLD VENIPUNCTURE: CPT

## 2021-07-23 PROCEDURE — 80048 BASIC METABOLIC PNL TOTAL CA: CPT

## 2021-07-23 RX ADMIN — OXYCODONE 5 MG: 5 TABLET ORAL at 17:42

## 2021-07-23 RX ADMIN — BACLOFEN 10 MG: 10 TABLET ORAL at 10:18

## 2021-07-23 RX ADMIN — OXYCODONE HYDROCHLORIDE 10 MG: 10 TABLET ORAL at 22:49

## 2021-07-23 RX ADMIN — OXYCODONE 5 MG: 5 TABLET ORAL at 10:18

## 2021-07-23 RX ADMIN — OXYCODONE 5 MG: 5 TABLET ORAL at 06:58

## 2021-07-23 RX ADMIN — GABAPENTIN 400 MG: 400 CAPSULE ORAL at 12:23

## 2021-07-23 RX ADMIN — GABAPENTIN 400 MG: 400 CAPSULE ORAL at 04:24

## 2021-07-23 RX ADMIN — OXYCODONE HYDROCHLORIDE 10 MG: 10 TABLET ORAL at 00:47

## 2021-07-23 RX ADMIN — DOCUSATE SODIUM 50 MG AND SENNOSIDES 8.6 MG 2 TABLET: 8.6; 5 TABLET, FILM COATED ORAL at 04:24

## 2021-07-23 RX ADMIN — CYCLOBENZAPRINE 5 MG: 10 TABLET, FILM COATED ORAL at 14:32

## 2021-07-23 RX ADMIN — GABAPENTIN 400 MG: 400 CAPSULE ORAL at 17:42

## 2021-07-23 RX ADMIN — APIXABAN 10 MG: 5 TABLET, FILM COATED ORAL at 17:42

## 2021-07-23 RX ADMIN — OXYCODONE 5 MG: 5 TABLET ORAL at 14:32

## 2021-07-23 RX ADMIN — APIXABAN 10 MG: 5 TABLET, FILM COATED ORAL at 04:24

## 2021-07-23 RX ADMIN — DOCUSATE SODIUM 50 MG AND SENNOSIDES 8.6 MG 2 TABLET: 8.6; 5 TABLET, FILM COATED ORAL at 17:42

## 2021-07-23 ASSESSMENT — ENCOUNTER SYMPTOMS
NAUSEA: 0
CONSTIPATION: 0
FEVER: 0
PALPITATIONS: 0
DIARRHEA: 0
VOMITING: 0
CHILLS: 0
COUGH: 0
SHORTNESS OF BREATH: 1
BACK PAIN: 1

## 2021-07-23 ASSESSMENT — PAIN DESCRIPTION - PAIN TYPE
TYPE: ACUTE PAIN

## 2021-07-23 NOTE — PROGRESS NOTES
Received report from ANGELA James. Reviewed POC, no questions at this time. Call light is within reach, bed is in lowest/locked position.

## 2021-07-23 NOTE — DISCHARGE PLANNING
Anticipated Discharge Disposition: home with O2    Action: LSW spoke to [facility]. [facility] will provide O2 for her while she is staying in [facility] and [place] but they are unable to provide O2 for flight. Patient needs [id number] for flights. Alexandre with [facility] explained the limitations with O2 for flights.     Patient and daughter agreeable with plan.     LSW spoke to [employer]. They report that they can only go up to 3L continuous and 3-4 pulse. Patient to be seen by RT to see if she can tolerate pulse oxygen. LSW provided patient and daughter with order form to read over.     LSW to facilitate [id number] once RT determines if patient can tolerate pulse oxygen.    Barriers to Discharge: oxygen for flight    Plan: LSW to f/u on O2 requirements.

## 2021-07-23 NOTE — CARE PLAN
The patient is Stable - Low risk of patient condition declining or worsening    Shift Goals  Clinical Goals: Pain control, mobility  Patient Goals: Rest, comfort, discharge planning    Problem: Knowledge Deficit - Standard  Goal: Patient and family/care givers will demonstrate understanding of plan of care, disease process/condition, diagnostic tests and medications  Outcome: Progressing     Problem: Fall Risk  Goal: Patient will remain free from falls  Outcome: Progressing

## 2021-07-23 NOTE — PROGRESS NOTES
Assumed care of pt, received bedside report from Park MILLER. Pt sitting up in bed, no complaints of pain, room air, A/Ox4. Fall and safety precautions in place, strip alarm in place. Discussed POC with pt, pt verbalizes understanding. No further needs at this time.

## 2021-07-23 NOTE — CARE PLAN
Problem: Knowledge Deficit - Standard  Goal: Patient and family/care givers will demonstrate understanding of plan of care, disease process/condition, diagnostic tests and medications  Outcome: Progressing     Problem: Fall Risk  Goal: Patient will remain free from falls  Outcome: Progressing     The patient is Stable - Low risk of patient condition declining or worsening    Shift Goals  Clinical Goals: Pain control, mobility  Patient Goals: rest, comfort, safety    Progress made toward(s) clinical / shift goals: Fall precautions in place. Bed in lowest position. Non-skid socks in place. Personal possessions within reach. Mobility sign on door. Bed-alarm on. Call light within reach. Pt educated regarding fall prevention and states understanding.   Pt educated regarding plan of care and medications. All questions answered.       Patient is not progressing towards the following goals:

## 2021-07-23 NOTE — PROGRESS NOTES
"Daily Progress Note:     Date of Service: 7/23/2021  Primary Team: [facility] Team   Attending: Barrett Beasley M.D.   Senior Resident: Dr. Shazia Fang  Intern: Dr. Shamar Recio  Contact:  [phone]    Chief Complaint:   Shortness of breath, back pain    Subjective:  76 year old woman with history of \"irregular heart beat\" presented on 07/17/2021 with hypoxia and shortness of breath. She is from [place] and was on a mother-daughter road trip when she fell off the camper van fracturing her T7-T8. She originally went to [facility] where they then transferred her to [facility]. Neurosurgery did not recommend surgery and so she was then transferred to acute rehab. Records indicate patient wished to stop Lovenox on 07/15/2021. The next day the patient was found to be hypoxic with low blood pressure and lightheaded. The next day she continued to feel short of breath and tachycardic. Presented to ER where she was found to have multiple bilateral lobar and segmental pulmonary emboli.     Interval History:  No acute events overnight. Oxygen and Walker orders placed - patient requiring 3L of Oxygen with ambulation which will require 4L of oxygen during flight, if she wishes to pursue flying back to [place] in the near future. Patient medicare will not cover oxygen, oxygen supplier came and spoke with patient regarding logistics to get back to [place].     Review of Systems:    Review of Systems   Constitutional: Negative for chills and fever.   Respiratory: Positive for shortness of breath. Negative for cough.    Cardiovascular: Negative for chest pain and palpitations.   Gastrointestinal: Negative for constipation, diarrhea, nausea and vomiting.   Musculoskeletal: Positive for back pain.     Objective Data:   Physical Exam:   Vitals:   Temp:  [35.6 °C (96.1 °F)-36.7 °C (98 °F)] 35.6 °C (96.1 °F)  Pulse:  [] 113  Resp:  [18] 18  BP: (111-122)/(69-86) 113/76  SpO2:  [92 %-99 %] 92 %    Physical " Exam  Constitutional:       General: She is not in acute distress.     Appearance: She is not diaphoretic.   HENT:      Head: Normocephalic and atraumatic.   Cardiovascular:      Rate and Rhythm: Normal rate and regular rhythm.   Pulmonary:      Effort: Pulmonary effort is normal.      Breath sounds: Normal breath sounds.   Musculoskeletal:      Comments: 1+ distal pulses bilaterally, no pedal edema.       Labs:     Recent Labs     07/23/21  0248   WBC 3.8*   RBC 3.62*   HEMOGLOBIN 9.6*   HEMATOCRIT 32.1*   MCV 88.7   MCH 26.5*   RDW 51.6*   PLATELETCT 252   MPV 10.3   NEUTSPOLYS 58.00   LYMPHOCYTES 30.30   MONOCYTES 3.60   EOSINOPHILS 1.80   BASOPHILS 0.90   RBCMORPHOLO Normal     Recent Labs     07/23/21  0248   SODIUM 137   POTASSIUM 4.0   CHLORIDE 100   CO2 29   GLUCOSE 112*   BUN 14         Imaging:   CTA pulmonary artery showed multiple bilateral lobar and segmental emboli. RV/LV ratio elevated @ 1.3 consistent with right heart strain. Patchy atelectasis/pleural effusion. Indeterminate 4 mm nodule within right upper lobe inferior. Scarring atelectasis.     Bilateral lower extremity venous doppler 07/18/2021: Right lower extremity - Evidence of acute occlusive deep vein thrombosis in the right peroneal veins.    All other vessels are demonstrated complete color filling and    compressibility with normal venous flow dynamics including spontaneous    flow, response to augmentation maneuvers, and respiratory phasicity.       Left lower extremity -.    Evidence of acute occlusive deep vein thrombosis in the one of the paired    peroneal veins.    All other vessles are demonstrated complete color filling and    compressibility with normal venous flow dynamics including spontaneous    flow, response to augmentation maneuvers, and respiratory phasicity.    Echocardiogram 07/18/2021:  No prior study is available for comparison.   Left ventricular ejection fraction is visually estimated to be 60%.  Unable to estimate  pulmonary artery pressure due to an inadequate   tricuspid regurgitant jet.        Chest x-ray showed enlarged cardiac silhouette with bibasilar atelectasis.     ECG -7/18/2021, per my read, was sinus, ~97 bpm, normal axis, slight ST depression (1-2mm) in II,III, v2-v5.       Problem Representation:  76 year old woman visiting from [place] who sustained T7-T8 fracture after falling off [employer] van while on road trip with her daughter who was then found to have submassive pulmonary embolism during acute rehab.      * Acute pulmonary embolism (HCC)- (present on admission)  Assessment & Plan  Multiple bilateral lobar and segmental emboli with SBP >90mmHG categorize patient as having a submassive pulmonary embolism. Likely related to debility secondary to T7-T8 fracture. No family history of blood clots, no history of blood clots or hormone replacement therapy. On apixaban 10 mg bid for 5 days total before transitioning to apixaban 5 mg BID for 6 months. Shortness of breath improving on 3L of NC.  Plan:  - On apixaban 10 mg BID since 07/19/2021 for 5 days total until end of 07/23/2021, then on 07/24/2021 transition to apixaban 5 mg BID after for minimum of 6 months.   - O2 as needed, continue trying to ween down, keep SpO2 >92%  - Continue using incentive spirometer hourly.  - Will need to be weaned down to 3L of O2 during ambulation to be able to fly at 4L.      Debility- (present on admission)  Assessment & Plan  PMR recommended home with outpatient rehab vs. Skilled nursing facility, daughter and patient refused skilled nursing facility.Will need to schedule outpatient rehab with PCP when back in [place].  - DME 4 wheel walker with seat ordered.    Spinal fracture of T8 vertebra (HCC)- (present on admission)  Assessment & Plan  Secondary to fall, recent fracture  Transferred from acute rehab center    Spinal fracture of T7 vertebra (HCC)- (present on admission)  Assessment & Plan  Secondary to fall, recent fracture on  07/05/2021. Did not meet criteria to go back to physical rehab. Will need to schedule outpatient rehab when back in [place].  - On baclofen, gabapentin and PRN oxycodone.  - Alternate flexeril PRN with baclofen PRN.      Palpitations  Assessment & Plan  Stopped atenolol to avoid masking pulmonary embolism related tachycardia. No recent palpitations. Patient feels anxious about not taking atenolol. If anxiety persists, will consider anti-anxiety medication over beta blocker.     Anemia- (present on admission)  Assessment & Plan  Between 9.4 to 10.4 for past 2 weeks. No further records.  - Monitor with CBC

## 2021-07-24 PROCEDURE — A9270 NON-COVERED ITEM OR SERVICE: HCPCS | Performed by: INTERNAL MEDICINE

## 2021-07-24 PROCEDURE — A9270 NON-COVERED ITEM OR SERVICE: HCPCS

## 2021-07-24 PROCEDURE — 700102 HCHG RX REV CODE 250 W/ 637 OVERRIDE(OP)

## 2021-07-24 PROCEDURE — 770006 HCHG ROOM/CARE - MED/SURG/GYN SEMI*

## 2021-07-24 PROCEDURE — 700102 HCHG RX REV CODE 250 W/ 637 OVERRIDE(OP): Performed by: STUDENT IN AN ORGANIZED HEALTH CARE EDUCATION/TRAINING PROGRAM

## 2021-07-24 PROCEDURE — 99231 SBSQ HOSP IP/OBS SF/LOW 25: CPT | Mod: GC | Performed by: INTERNAL MEDICINE

## 2021-07-24 PROCEDURE — 700102 HCHG RX REV CODE 250 W/ 637 OVERRIDE(OP): Performed by: INTERNAL MEDICINE

## 2021-07-24 PROCEDURE — A9270 NON-COVERED ITEM OR SERVICE: HCPCS | Performed by: STUDENT IN AN ORGANIZED HEALTH CARE EDUCATION/TRAINING PROGRAM

## 2021-07-24 RX ADMIN — BACLOFEN 10 MG: 10 TABLET ORAL at 04:23

## 2021-07-24 RX ADMIN — OXYCODONE HYDROCHLORIDE 10 MG: 10 TABLET ORAL at 20:09

## 2021-07-24 RX ADMIN — OXYCODONE HYDROCHLORIDE 10 MG: 10 TABLET ORAL at 04:24

## 2021-07-24 RX ADMIN — GABAPENTIN 400 MG: 400 CAPSULE ORAL at 16:28

## 2021-07-24 RX ADMIN — DOCUSATE SODIUM 50 MG AND SENNOSIDES 8.6 MG 2 TABLET: 8.6; 5 TABLET, FILM COATED ORAL at 16:28

## 2021-07-24 RX ADMIN — APIXABAN 10 MG: 5 TABLET, FILM COATED ORAL at 04:24

## 2021-07-24 RX ADMIN — GABAPENTIN 400 MG: 400 CAPSULE ORAL at 12:21

## 2021-07-24 RX ADMIN — APIXABAN 5 MG: 5 TABLET, FILM COATED ORAL at 16:27

## 2021-07-24 RX ADMIN — DOCUSATE SODIUM 50 MG AND SENNOSIDES 8.6 MG 2 TABLET: 8.6; 5 TABLET, FILM COATED ORAL at 04:26

## 2021-07-24 RX ADMIN — CYCLOBENZAPRINE 5 MG: 10 TABLET, FILM COATED ORAL at 20:09

## 2021-07-24 RX ADMIN — OXYCODONE 5 MG: 5 TABLET ORAL at 12:21

## 2021-07-24 RX ADMIN — GABAPENTIN 400 MG: 400 CAPSULE ORAL at 04:23

## 2021-07-24 RX ADMIN — OXYCODONE 5 MG: 5 TABLET ORAL at 16:28

## 2021-07-24 ASSESSMENT — ENCOUNTER SYMPTOMS
BACK PAIN: 1
COUGH: 0
DIARRHEA: 0
CONSTIPATION: 0
VOMITING: 0
PALPITATIONS: 0
SHORTNESS OF BREATH: 1
CHILLS: 0
NAUSEA: 0
FEVER: 0

## 2021-07-24 ASSESSMENT — PAIN DESCRIPTION - PAIN TYPE
TYPE: ACUTE PAIN

## 2021-07-24 NOTE — PROGRESS NOTES
Assumed care at 1915. Bedside report received from kristy RN. Patient's chart and MAR reviewed. 12 hour chart check complete. Assessment complete, pt 0/10 pain at this time. Pt is awake in bed. Pt is A & O x 4. Patient was updated on plan of care for the day. Questions answered and concerns addressed.  Pt denies any additional needs at this time. White board updated. Call light, phone and personal belongings within reach. Bed alarm on and working appropriately. Vital signs stable.

## 2021-07-24 NOTE — DISCHARGE PLANNING
Anticipated Discharge Disposition: TBD    Action: Patient does not have a chronic condition to qualify for O2.     LSW had [facility] f/u with [facility]. [facility] not answering phone on the weekend.     LSW has reached out to RN CM Supervisor for assistance.    Barriers to Discharge: on O2 doesn't qualify for O2    Plan: LSW to f/u with supervisor    1515: LSW spoke to supervisor Joselyn who requests a new walking O2 test as the last test was done on the 22. LSW requested test from bedside RN.

## 2021-07-24 NOTE — CARE PLAN
The patient is Stable - Low risk of patient condition declining or worsening    Shift Goals  Clinical Goals: Pain control  Patient Goals: Rest, comfort, discharge planning    Progress made toward(s) clinical / shift goals:    Problem: Knowledge Deficit - Standard  Goal: Patient and family/care givers will demonstrate understanding of plan of care, disease process/condition, diagnostic tests and medications  Outcome: Progressing  Note: Pt educated regarding plan of care and medications. All questions answered.         Patient is not progressing towards the following goals:

## 2021-07-24 NOTE — DISCHARGE PLANNING
Agency/Facility Name: [facility]  Outcome: No answer. Phone is picked up and rings back through to hold

## 2021-07-25 PROCEDURE — 99231 SBSQ HOSP IP/OBS SF/LOW 25: CPT | Mod: GC | Performed by: INTERNAL MEDICINE

## 2021-07-25 PROCEDURE — 770006 HCHG ROOM/CARE - MED/SURG/GYN SEMI*

## 2021-07-25 PROCEDURE — 700102 HCHG RX REV CODE 250 W/ 637 OVERRIDE(OP): Performed by: STUDENT IN AN ORGANIZED HEALTH CARE EDUCATION/TRAINING PROGRAM

## 2021-07-25 PROCEDURE — 700102 HCHG RX REV CODE 250 W/ 637 OVERRIDE(OP): Performed by: INTERNAL MEDICINE

## 2021-07-25 PROCEDURE — 700102 HCHG RX REV CODE 250 W/ 637 OVERRIDE(OP)

## 2021-07-25 PROCEDURE — A9270 NON-COVERED ITEM OR SERVICE: HCPCS | Performed by: INTERNAL MEDICINE

## 2021-07-25 PROCEDURE — A9270 NON-COVERED ITEM OR SERVICE: HCPCS

## 2021-07-25 PROCEDURE — A9270 NON-COVERED ITEM OR SERVICE: HCPCS | Performed by: STUDENT IN AN ORGANIZED HEALTH CARE EDUCATION/TRAINING PROGRAM

## 2021-07-25 RX ADMIN — CYCLOBENZAPRINE 5 MG: 10 TABLET, FILM COATED ORAL at 16:18

## 2021-07-25 RX ADMIN — OXYCODONE 5 MG: 5 TABLET ORAL at 08:52

## 2021-07-25 RX ADMIN — APIXABAN 5 MG: 5 TABLET, FILM COATED ORAL at 16:15

## 2021-07-25 RX ADMIN — GABAPENTIN 400 MG: 400 CAPSULE ORAL at 05:13

## 2021-07-25 RX ADMIN — BACLOFEN 10 MG: 10 TABLET ORAL at 08:59

## 2021-07-25 RX ADMIN — OXYCODONE 5 MG: 5 TABLET ORAL at 20:39

## 2021-07-25 RX ADMIN — GABAPENTIN 400 MG: 400 CAPSULE ORAL at 11:04

## 2021-07-25 RX ADMIN — GABAPENTIN 400 MG: 400 CAPSULE ORAL at 16:15

## 2021-07-25 RX ADMIN — APIXABAN 5 MG: 5 TABLET, FILM COATED ORAL at 05:13

## 2021-07-25 RX ADMIN — OXYCODONE HYDROCHLORIDE 10 MG: 10 TABLET ORAL at 03:04

## 2021-07-25 RX ADMIN — OXYCODONE 5 MG: 5 TABLET ORAL at 12:26

## 2021-07-25 RX ADMIN — DOCUSATE SODIUM 50 MG AND SENNOSIDES 8.6 MG 2 TABLET: 8.6; 5 TABLET, FILM COATED ORAL at 16:15

## 2021-07-25 RX ADMIN — OXYCODONE 5 MG: 5 TABLET ORAL at 15:36

## 2021-07-25 RX ADMIN — DOCUSATE SODIUM 50 MG AND SENNOSIDES 8.6 MG 2 TABLET: 8.6; 5 TABLET, FILM COATED ORAL at 05:13

## 2021-07-25 ASSESSMENT — PAIN DESCRIPTION - PAIN TYPE
TYPE: ACUTE PAIN

## 2021-07-25 ASSESSMENT — ENCOUNTER SYMPTOMS
SHORTNESS OF BREATH: 1
VOMITING: 0
NAUSEA: 0
DIARRHEA: 0
FEVER: 0
CHILLS: 0
CONSTIPATION: 0
BACK PAIN: 1
PALPITATIONS: 0
COUGH: 0

## 2021-07-25 ASSESSMENT — FIBROSIS 4 INDEX: FIB4 SCORE: 1.32

## 2021-07-25 NOTE — CARE PLAN
The patient is Watcher - Medium risk of patient condition declining or worsening    Shift Goals  Clinical Goals: ween oxygen while in bed  Patient Goals: decrease anxiety   Family Goals: do not apply oxygen at will      Problem: Pain - Standard  Goal: Alleviation of pain or a reduction in pain to the patient’s comfort goal  Patient receiving PRN oxycodone and muscle relaxants to manage pain    Outcome: Progressing     Problem: Knowledge Deficit - Standard  Goal: Patient and family/care givers will demonstrate understanding of plan of care, disease process/condition, diagnostic tests and medications  Outcome: Progressing     Problem: Fall Risk  Goal: Patient will remain free from falls  Outcome: Progressing     Problem: Discharge Barriers/Planning  Goal: Patient's continuum of care needs are met  Outcome: Progressing

## 2021-07-25 NOTE — PROGRESS NOTES
"Daily Progress Note:     Date of Service: 7/25/2021  Primary Team: [facility] Team   Attending: Barrett Beasley M.D.   Senior Resident: Dr. Shazia Fang  Intern: Dr. Shamar Recio  Contact:  [phone]    Chief Complaint:   Shortness of breath, back pain    Subjective:  76 year old woman with history of \"irregular heart beat\" presented on 07/17/2021 with hypoxia and shortness of breath. She is from [place] and was on a mother-daughter road trip when she fell off the camper van fracturing her T7-T8. She originally went to [facility] where they then transferred her to [facility]. Neurosurgery did not recommend surgery and so she was then transferred to acute rehab. On transfer she was on heparin, but switched to Lovenox. She was ambulating >125 feet, it was discontinued. The next day the patient was found to be hypoxic with low blood pressure and lightheaded. The next day she continued to feel short of breath and tachycardic. Presented to ER where she was found to have multiple bilateral lobar and segmental pulmonary emboli.     Interval History:  No acute events overnight. Oxygen and Walker orders placed - patient requiring 1L of Oxygen with ambulation which will require 3L of oxygen during flight, if she wishes to pursue flying back to [place] in the near future. Patient medicare will not cover oxygen, patient and family working with oxygen supplier regarding logistics to get back to [place].  Family states medical record is incorrect in that patient wished to stop Lovenox. Per record on 07/15/2021, \"She would like to be off of Lovenox. Discussed increased ambulation. She worked with PT today and walked > 300 feet so agreed to stop.\"    Review of Systems:    Review of Systems   Constitutional: Negative for chills and fever.   Respiratory: Positive for shortness of breath. Negative for cough.    Cardiovascular: Negative for chest pain and palpitations.   Gastrointestinal: Negative for constipation, " diarrhea, nausea and vomiting.   Musculoskeletal: Positive for back pain.     Objective Data:   Physical Exam:   Vitals:   Temp:  [35.9 °C (96.6 °F)-36.4 °C (97.6 °F)] 36.4 °C (97.6 °F)  Pulse:  [83-94] 88  Resp:  [17-18] 18  BP: (119-140)/(57-84) 129/57  SpO2:  [91 %-93 %] 91 %    Physical Exam  Constitutional:       General: She is not in acute distress.     Appearance: She is not diaphoretic.   HENT:      Head: Normocephalic and atraumatic.   Pulmonary:      Effort: Pulmonary effort is normal.      Comments: Appears to be breathing comfortably on 1 L of oxymask  Musculoskeletal:      Comments: no pedal edema.       Labs:     Recent Labs     07/23/21  0248   WBC 3.8*   RBC 3.62*   HEMOGLOBIN 9.6*   HEMATOCRIT 32.1*   MCV 88.7   MCH 26.5*   RDW 51.6*   PLATELETCT 252   MPV 10.3   NEUTSPOLYS 58.00   LYMPHOCYTES 30.30   MONOCYTES 3.60   EOSINOPHILS 1.80   BASOPHILS 0.90   RBCMORPHOLO Normal     Recent Labs     07/23/21  0248   SODIUM 137   POTASSIUM 4.0   CHLORIDE 100   CO2 29   GLUCOSE 112*   BUN 14         Imaging:   CTA pulmonary artery showed multiple bilateral lobar and segmental emboli. RV/LV ratio elevated @ 1.3 consistent with right heart strain. Patchy atelectasis/pleural effusion. Indeterminate 4 mm nodule within right upper lobe inferior. Scarring atelectasis.     Bilateral lower extremity venous doppler 07/18/2021: Right lower extremity - Evidence of acute occlusive deep vein thrombosis in the right peroneal veins.    All other vessels are demonstrated complete color filling and    compressibility with normal venous flow dynamics including spontaneous    flow, response to augmentation maneuvers, and respiratory phasicity.       Left lower extremity -.    Evidence of acute occlusive deep vein thrombosis in the one of the paired    peroneal veins.    All other vessles are demonstrated complete color filling and    compressibility with normal venous flow dynamics including spontaneous    flow, response to  augmentation maneuvers, and respiratory phasicity.    Echocardiogram 07/18/2021:  No prior study is available for comparison.   Left ventricular ejection fraction is visually estimated to be 60%.  Unable to estimate pulmonary artery pressure due to an inadequate   tricuspid regurgitant jet.        Chest x-ray showed enlarged cardiac silhouette with bibasilar atelectasis.     ECG -7/18/2021, per my read, was sinus, ~97 bpm, normal axis, slight ST depression (1-2mm) in II,III, v2-v5.       Problem Representation:  76 year old woman visiting from [place] who sustained T7-T8 fracture after falling off [employer] van while on road trip with her daughter who was then found to have submassive pulmonary embolism during acute rehab.      * Acute pulmonary embolism (HCC)- (present on admission)  Assessment & Plan  Multiple bilateral lobar and segmental emboli with SBP >90mmHG categorize patient as having a submassive pulmonary embolism. Likely related to debility secondary to T7-T8 fracture. No family history of blood clots, no history of blood clots or hormone replacement therapy. Completed apixaban 10 mg bid for 5 days. Currently on apixaban 5 mg BID for 6 months. Shortness of breath improving. Now on 1L of NC.  Plan:  - On apixaban 5 mg BID for minimum of 6 months s/p PE.  - O2 as needed, continue trying to ween down, keep SpO2 >92%  - Continue using incentive spirometer hourly.  - Trying to wean off oxygen with 6 minute O2 eval to determine whether patient needs O2 for flight. If patient cannot be weaned off O2 or fails 6 minute walking O2 eval, then will need to have 2L additional O2 on top of baseline for flight.       Debility- (present on admission)  Assessment & Plan  PMR recommended home with outpatient rehab vs. Skilled nursing facility, daughter and patient refused skilled nursing facility.Will need to schedule outpatient rehab with PCP when back in [place].  - DME 4 wheel walker with seat ordered.    Spinal fracture of  T8 vertebra (HCC)- (present on admission)  Assessment & Plan  Secondary to fall, recent fracture  Transferred from acute rehab center    Spinal fracture of T7 vertebra (HCC)- (present on admission)  Assessment & Plan  Secondary to fall, recent fracture on 07/05/2021. Did not meet criteria to go back to physical rehab. Will need to schedule outpatient rehab when back in [place].  - On baclofen, gabapentin and PRN oxycodone.  - Alternate flexeril PRN with baclofen PRN.      Palpitations  Assessment & Plan  Stopped atenolol to avoid masking pulmonary embolism related tachycardia. Patient notes recent palpitations, but plans to address these with her PCP in [place].     Anemia- (present on admission)  Assessment & Plan  Between 9.4 to 10.4 for past 3 weeks. No further records.  - Monitor with CBC    DVT ppx: apixaban 5 mg BID

## 2021-07-25 NOTE — DISCHARGE PLANNING
Received request from [facility] supervisor to obtain completed [employer] application from pt.     Met with pt at bedside to collect application. Pt states if possible, she would like to d/c to her rental with home O2 set up and have portable concentrator delivered to the rental home. Her flight is scheduled for Tuesday going to [place]. Both addresses to hospital and rental home listed on application. Pt had additional questions related to rental. Discussed that company may reach out to her when processing referral.     Completed application faxed to DPA

## 2021-07-25 NOTE — PROGRESS NOTES
Informed by KARI Phipps that vitals were O2 sat in 70's, pt applied oxy mask 1 L and that then O2 returned to appropriate level.

## 2021-07-25 NOTE — CARE PLAN
Problem: Fall Risk  Goal: Patient will remain free from falls  Outcome: Progressing     The patient is Watcher - Medium risk of patient condition declining or worsening    Shift Goals  Clinical Goals: ween oxygen while in bed  Patient Goals: decrease anxiety   Family Goals: do not apply oxygen at will    Progress made toward(s) clinical / shift goals:  Pt remains free from falls and trying to progress toward oxygen demands     Patient is not progressing towards the following goals:      Problem: Discharge Barriers/Planning  Goal: Patient's continuum of care needs are met  Outcome: Not Progressing     Problem: Respiratory  Goal: Patient will achieve/maintain optimum respiratory ventilation and gas exchange  Outcome: Not Progressing

## 2021-07-25 NOTE — PROGRESS NOTES
"Daily Progress Note:     Date of Service: 7/24/2021  Primary Team: [facility] IM Orange Team   Attending: Barrett Beasley M.D.   Senior Resident: Dr. Shazia Fang  Intern: Dr. Shamar Recio  Contact:  [phone]    Chief Complaint:   Shortness of breath, back pain    Subjective:  76 year old woman with history of \"irregular heart beat\" presented on 07/17/2021 with hypoxia and shortness of breath. She is from [place] and was on a mother-daughter road trip when she fell off the camper van fracturing her T7-T8. She originally went to [facility] where they then transferred her to [facility]. Neurosurgery did not recommend surgery and so she was then transferred to acute rehab. Records indicate patient wished to stop Lovenox on 07/15/2021. The next day the patient was found to be hypoxic with low blood pressure and lightheaded. The next day she continued to feel short of breath and tachycardic. Presented to ER where she was found to have multiple bilateral lobar and segmental pulmonary emboli.     Interval History:  No acute events overnight. Oxygen and Walker orders placed - patient requiring 1L of Oxygen with ambulation which will require 3L of oxygen during flight, if she wishes to pursue flying back to [place] in the near future. Patient medicare will not cover oxygen, oxygen supplier came and spoke with patient regarding logistics to get back to [place].     Review of Systems:    Review of Systems   Constitutional: Negative for chills and fever.   Respiratory: Positive for shortness of breath. Negative for cough.    Cardiovascular: Negative for chest pain and palpitations.   Gastrointestinal: Negative for constipation, diarrhea, nausea and vomiting.   Musculoskeletal: Positive for back pain.     Objective Data:   Physical Exam:   Vitals:   Temp:  [35.9 °C (96.6 °F)-36.5 °C (97.7 °F)] 35.9 °C (96.7 °F)  Pulse:  [] 92  Resp:  [16-18] 18  BP: (118-131)/(64-74) 119/74  SpO2:  [93 %-95 %] 93 %    Physical " Exam  Constitutional:       General: She is not in acute distress.     Appearance: She is not diaphoretic.   HENT:      Head: Normocephalic and atraumatic.   Cardiovascular:      Rate and Rhythm: Normal rate and regular rhythm.   Pulmonary:      Effort: Pulmonary effort is normal.      Breath sounds: Normal breath sounds.   Musculoskeletal:      Comments: 1+ distal pulses bilaterally, no pedal edema.       Labs:     Recent Labs     07/23/21  0248   WBC 3.8*   RBC 3.62*   HEMOGLOBIN 9.6*   HEMATOCRIT 32.1*   MCV 88.7   MCH 26.5*   RDW 51.6*   PLATELETCT 252   MPV 10.3   NEUTSPOLYS 58.00   LYMPHOCYTES 30.30   MONOCYTES 3.60   EOSINOPHILS 1.80   BASOPHILS 0.90   RBCMORPHOLO Normal     Recent Labs     07/23/21  0248   SODIUM 137   POTASSIUM 4.0   CHLORIDE 100   CO2 29   GLUCOSE 112*   BUN 14         Imaging:   CTA pulmonary artery showed multiple bilateral lobar and segmental emboli. RV/LV ratio elevated @ 1.3 consistent with right heart strain. Patchy atelectasis/pleural effusion. Indeterminate 4 mm nodule within right upper lobe inferior. Scarring atelectasis.     Bilateral lower extremity venous doppler 07/18/2021: Right lower extremity - Evidence of acute occlusive deep vein thrombosis in the right peroneal veins.    All other vessels are demonstrated complete color filling and    compressibility with normal venous flow dynamics including spontaneous    flow, response to augmentation maneuvers, and respiratory phasicity.       Left lower extremity -.    Evidence of acute occlusive deep vein thrombosis in the one of the paired    peroneal veins.    All other vessles are demonstrated complete color filling and    compressibility with normal venous flow dynamics including spontaneous    flow, response to augmentation maneuvers, and respiratory phasicity.    Echocardiogram 07/18/2021:  No prior study is available for comparison.   Left ventricular ejection fraction is visually estimated to be 60%.  Unable to estimate  pulmonary artery pressure due to an inadequate   tricuspid regurgitant jet.        Chest x-ray showed enlarged cardiac silhouette with bibasilar atelectasis.     ECG -7/18/2021, per my read, was sinus, ~97 bpm, normal axis, slight ST depression (1-2mm) in II,III, v2-v5.       Problem Representation:  76 year old woman visiting from [place] who sustained T7-T8 fracture after falling off camper van while on road trip with her daughter who was then found to have submassive pulmonary embolism during acute rehab.      * Acute pulmonary embolism (HCC)- (present on admission)  Assessment & Plan  Multiple bilateral lobar and segmental emboli with SBP >90mmHG categorize patient as having a submassive pulmonary embolism. Likely related to debility secondary to T7-T8 fracture. No family history of blood clots, no history of blood clots or hormone replacement therapy. Completed apixaban 10 mg bid for 5 days. Currently on apixaban 5 mg BID for 6 months. Shortness of breath improving. Now on 1L of NC.  Plan:  - On apixaban 5 mg BID for minimum of 6 months s/p PE.  - O2 as needed, continue trying to ween down, keep SpO2 >92%  - Continue using incentive spirometer hourly.  - Trying to wean off oxygen with 6 minute O2 eval to determine whether patient needs O2 for flight. If patient cannot be weaned off O2 or fails 6 minute walking O2 eval, then will need to have 2L additional O2 on top of baseline for flight.       Debility- (present on admission)  Assessment & Plan  PMR recommended home with outpatient rehab vs. Skilled nursing facility, daughter and patient refused skilled nursing facility.Will need to schedule outpatient rehab with PCP when back in [place].  - DME 4 wheel walker with seat ordered.    Spinal fracture of T8 vertebra (HCC)- (present on admission)  Assessment & Plan  Secondary to fall, recent fracture  Transferred from acute rehab center    Spinal fracture of T7 vertebra (HCC)- (present on admission)  Assessment &  Plan  Secondary to fall, recent fracture on 07/05/2021. Did not meet criteria to go back to physical rehab. Will need to schedule outpatient rehab when back in [place].  - On baclofen, gabapentin and PRN oxycodone.  - Alternate flexeril PRN with baclofen PRN.      Palpitations  Assessment & Plan  Stopped atenolol to avoid masking pulmonary embolism related tachycardia. Patient notes recent palpitations, but plans to address these with her PCP in [place].     Anemia- (present on admission)  Assessment & Plan  Between 9.4 to 10.4 for past 2 weeks. No further records.  - Monitor with CBC    DVT ppx: apixaban 5 mg BID

## 2021-07-25 NOTE — DISCHARGE PLANNING
Anticipated Discharge Disposition: Home    Action:   Called to pt bedside to discuss oxygen set up.  Pt's daughter Hayden reports [employer] is unable to provide portable oxygen prior to pt's scheduled flight to [place] on Tuesday. Hayden requests we send request for portable oxygen to [facility].  CM notified pt and daughter [employer] does not provide the type of portable concentrator patient requires for domestic flights. DESTINI explained the portable tanks they provide are the same E tanks she has been using to ambulate and those are not allowed on airlines. DESTINI also explained [facility] would not provide any oxygen due to pt's insurance only covers oxygen for chronic diagnosis, not acute.  Hayden expressed frustration with the situation and delays in discharge.     CM provided Hayden with the name of another company who could potentially provide the portable concentrator. CM informed Hayden she would need to contact the company directly to inquire about availability/delivery options. Hayden requests to speak with administrative leader and care team for discharge conference.  Charge RN and bedside RN notified of request.     Barriers to Discharge:   Oxygen needs-currently 1-2 L.   If unable to wean off, pt will need a portable oxygen concentrator suitable for airline use.    Plan: HCM will follow and assist with discharge needs.

## 2021-07-25 NOTE — PROGRESS NOTES
Assumed care of patient at bedside report from NOC RN. Updated on POC. Patient currently A & O x 4; on 1 L O2 oxymask; up stb with fww; with complaints of acute pain. Medicated per MAR. Patient encouraged to use incentive spirometer. Call light within reach. Whiteboard updated. Fall precautions in place. Bed locked and in lowest position. All questions answered. No other needs indicated at this time.

## 2021-07-25 NOTE — PROGRESS NOTES
Received evening report from day RN. Pt is anxious, and asking for a pain med soon. Observed as having her oxygen on, told that when in bed we are trying to ween her off O2 and to not apply it. Family informed as well. Bed is locked in low position with call light and belongings within reach. POC discussed and all questions answered. All needs and requirements met at this time.

## 2021-07-25 NOTE — DISCHARGE PLANNING
[employer] intake & rental agreement form faxed to [phone] along with patient face sheet, face to face and 02 order @ 2290 via manual fax.     Scanned form into patient media tab.   [employer] Phone # for follow up- [phone]

## 2021-07-26 PROBLEM — J96.01 ACUTE RESPIRATORY FAILURE WITH HYPOXIA (HCC): Status: ACTIVE | Noted: 2021-07-26

## 2021-07-26 PROCEDURE — RXMED WILLOW AMBULATORY MEDICATION CHARGE: Performed by: STUDENT IN AN ORGANIZED HEALTH CARE EDUCATION/TRAINING PROGRAM

## 2021-07-26 PROCEDURE — 700102 HCHG RX REV CODE 250 W/ 637 OVERRIDE(OP): Performed by: STUDENT IN AN ORGANIZED HEALTH CARE EDUCATION/TRAINING PROGRAM

## 2021-07-26 PROCEDURE — A9270 NON-COVERED ITEM OR SERVICE: HCPCS | Performed by: INTERNAL MEDICINE

## 2021-07-26 PROCEDURE — 770006 HCHG ROOM/CARE - MED/SURG/GYN SEMI*

## 2021-07-26 PROCEDURE — 99231 SBSQ HOSP IP/OBS SF/LOW 25: CPT | Mod: GC | Performed by: INTERNAL MEDICINE

## 2021-07-26 PROCEDURE — 700102 HCHG RX REV CODE 250 W/ 637 OVERRIDE(OP): Performed by: INTERNAL MEDICINE

## 2021-07-26 PROCEDURE — A9270 NON-COVERED ITEM OR SERVICE: HCPCS | Performed by: STUDENT IN AN ORGANIZED HEALTH CARE EDUCATION/TRAINING PROGRAM

## 2021-07-26 RX ORDER — GABAPENTIN 400 MG/1
400 CAPSULE ORAL 3 TIMES DAILY
Qty: 90 CAPSULE | Refills: 0 | Status: SHIPPED | OUTPATIENT
Start: 2021-07-26

## 2021-07-26 RX ORDER — OXYCODONE HYDROCHLORIDE 5 MG/1
5 TABLET ORAL EVERY 6 HOURS PRN
Qty: 20 TABLET | Refills: 0 | Status: SHIPPED | OUTPATIENT
Start: 2021-07-26 | End: 2021-07-27

## 2021-07-26 RX ORDER — CYCLOBENZAPRINE HCL 5 MG
5 TABLET ORAL 3 TIMES DAILY PRN
Qty: 60 TABLET | Refills: 0 | Status: SHIPPED | OUTPATIENT
Start: 2021-07-26 | End: 2021-08-26

## 2021-07-26 RX ADMIN — OXYCODONE 5 MG: 5 TABLET ORAL at 15:43

## 2021-07-26 RX ADMIN — GABAPENTIN 400 MG: 400 CAPSULE ORAL at 16:59

## 2021-07-26 RX ADMIN — OXYCODONE 5 MG: 5 TABLET ORAL at 04:32

## 2021-07-26 RX ADMIN — OXYCODONE 5 MG: 5 TABLET ORAL at 18:51

## 2021-07-26 RX ADMIN — OXYCODONE 5 MG: 5 TABLET ORAL at 09:11

## 2021-07-26 RX ADMIN — APIXABAN 5 MG: 5 TABLET, FILM COATED ORAL at 16:59

## 2021-07-26 RX ADMIN — OXYCODONE HYDROCHLORIDE 10 MG: 10 TABLET ORAL at 00:37

## 2021-07-26 RX ADMIN — OXYCODONE 5 MG: 5 TABLET ORAL at 12:24

## 2021-07-26 RX ADMIN — OXYCODONE HYDROCHLORIDE 10 MG: 10 TABLET ORAL at 22:21

## 2021-07-26 RX ADMIN — GABAPENTIN 400 MG: 400 CAPSULE ORAL at 11:50

## 2021-07-26 RX ADMIN — GABAPENTIN 400 MG: 400 CAPSULE ORAL at 04:29

## 2021-07-26 RX ADMIN — DOCUSATE SODIUM 50 MG AND SENNOSIDES 8.6 MG 2 TABLET: 8.6; 5 TABLET, FILM COATED ORAL at 16:59

## 2021-07-26 RX ADMIN — APIXABAN 5 MG: 5 TABLET, FILM COATED ORAL at 04:29

## 2021-07-26 RX ADMIN — DOCUSATE SODIUM 50 MG AND SENNOSIDES 8.6 MG 2 TABLET: 8.6; 5 TABLET, FILM COATED ORAL at 04:29

## 2021-07-26 ASSESSMENT — PAIN DESCRIPTION - PAIN TYPE
TYPE: CHRONIC PAIN
TYPE: CHRONIC PAIN
TYPE: ACUTE PAIN

## 2021-07-26 ASSESSMENT — ENCOUNTER SYMPTOMS
SHORTNESS OF BREATH: 1
DIARRHEA: 0
COUGH: 0
CHILLS: 0
VOMITING: 0
BACK PAIN: 1
NAUSEA: 0
PALPITATIONS: 0
FEVER: 0
CONSTIPATION: 0

## 2021-07-26 NOTE — PROGRESS NOTES
Daily Progress Note:     Date of Service: 7/26/2021  Primary Team: [facility] IM Orange Team   Attending: Barrett Beasley M.D.   Senior Resident: Dr. Daniels  Intern: Dr. Mendoza  Contact:  [phone]    Chief Complaint:   Shortness of breath, back pain    Subjective:  Patient resting comfortably in bed this morning and denies any acute complaints overnight. She denies any chest pain, heart palpitations, worsening shortness of breath, nausea/vomiting or diarrhea overnight.  Patient expresses that her home concentrator was ordered and it's due to arrive tomorrow.    Review of Systems:    Review of Systems   Constitutional: Negative for chills and fever.   Respiratory: Positive for shortness of breath. Negative for cough.    Cardiovascular: Negative for chest pain and palpitations.   Gastrointestinal: Negative for constipation, diarrhea, nausea and vomiting.   Musculoskeletal: Positive for back pain.     Objective Data:   Physical Exam:   Vitals:   Temp:  [35.6 °C (96 °F)-36.7 °C (98 °F)] 36.7 °C (98 °F)  Pulse:  [78-97] 92  Resp:  [16-18] 16  BP: (112-129)/(57-72) 113/72  SpO2:  [91 %-92 %] 92 %    Physical Exam  Constitutional:       General: She is not in acute distress.     Appearance: Normal appearance. She is not toxic-appearing or diaphoretic.   HENT:      Head: Normocephalic and atraumatic.      Mouth/Throat:      Mouth: Mucous membranes are moist.      Pharynx: Oropharynx is clear. No oropharyngeal exudate or posterior oropharyngeal erythema.   Eyes:      General: No scleral icterus.        Right eye: No discharge.         Left eye: No discharge.   Cardiovascular:      Rate and Rhythm: Normal rate and regular rhythm.      Pulses: Normal pulses.      Heart sounds: Normal heart sounds. No murmur heard.   No gallop.    Pulmonary:      Effort: Pulmonary effort is normal. No respiratory distress.      Breath sounds: Normal breath sounds. No wheezing or rhonchi.      Comments: Appears to be breathing comfortably on 1 L  of oxymask  Abdominal:      General: Abdomen is flat. Bowel sounds are normal. There is no distension.      Palpations: Abdomen is soft.      Tenderness: There is no abdominal tenderness.   Musculoskeletal:         General: No swelling or tenderness.      Right lower leg: No edema.      Left lower leg: No edema.   Neurological:      Mental Status: She is alert and oriented to person, place, and time. Mental status is at baseline.   Psychiatric:         Mood and Affect: Mood normal.         Behavior: Behavior normal.       Labs:     No results for input(s): WBC, RBC, HEMOGLOBIN, HEMATOCRIT, MCV, MCH, RDW, PLATELETCT, MPV, NEUTSPOLYS, LYMPHOCYTES, MONOCYTES, EOSINOPHILS, BASOPHILS, RBCMORPHOLO in the last 72 hours.  No results for input(s): SODIUM, POTASSIUM, CHLORIDE, CO2, GLUCOSE, BUN, CPKTOTAL in the last 72 hours.      Imaging:   CTA pulmonary artery showed multiple bilateral lobar and segmental emboli. RV/LV ratio elevated @ 1.3 consistent with right heart strain. Patchy atelectasis/pleural effusion. Indeterminate 4 mm nodule within right upper lobe inferior. Scarring atelectasis.     Bilateral lower extremity venous doppler 07/18/2021: Right lower extremity - Evidence of acute occlusive deep vein thrombosis in the right peroneal veins.    All other vessels are demonstrated complete color filling and    compressibility with normal venous flow dynamics including spontaneous    flow, response to augmentation maneuvers, and respiratory phasicity.       Left lower extremity -.    Evidence of acute occlusive deep vein thrombosis in the one of the paired    peroneal veins.    All other vessles are demonstrated complete color filling and    compressibility with normal venous flow dynamics including spontaneous    flow, response to augmentation maneuvers, and respiratory phasicity.    Echocardiogram 07/18/2021:  No prior study is available for comparison.   Left ventricular ejection fraction is visually estimated to be  60%.  Unable to estimate pulmonary artery pressure due to an inadequate   tricuspid regurgitant jet.        Chest x-ray showed enlarged cardiac silhouette with bibasilar atelectasis.     ECG -7/18/2021, per my read, was sinus, ~97 bpm, normal axis, slight ST depression (1-2mm) in II,III, v2-v5.       Problem Representation:  76 year old woman visiting from [place] who sustained T7-T8 fracture after falling off [employer] van while on road trip with her daughter who was then found to have submassive pulmonary embolism during acute rehab.  Patient was started on anticoagulation with apixaban, which she will need for 3-6 months after discharge.  Awaiting set up of home oxygen prior to coordinating discharge.      * Acute pulmonary embolism (HCC)- (present on admission)  Assessment & Plan  Multiple bilateral lobar and segmental embolism = submassive pulmonary embolism. Likely related to debility secondary to T7-T8 fracture. No family history of blood clots, no history of blood clots or hormone replacement therapy. Completed apixaban 10 mg bid for 5 days. Shortness of breath improving. Now on 1L of NC.  Plan:  - On apixaban 5 mg BID for minimum of 3-6 months s/p PE.  - O2 as needed, continue trying to ween down, keep SpO2 >92%.  Currently on 1 L  - Continue using incentive spirometer hourly.  - Pending concentrator.  Once received will coordinate discharge.         Acute respiratory failure with hypoxia (HCC)  Assessment & Plan  Patient not on home oxygen and at one time required 4L during hospitalization. Likely due to acute PE and previous hx of KIM.    -Weaned down to 1L O2 today.  -Will need home oxygen concentrator before discharge to safely fly to [place].  -Advised pt to continue to use incentive spirometer hourly.    Spinal fracture of T7 vertebra (HCC)- (present on admission)  Assessment & Plan  Secondary to fall, recent fracture on 07/05/2021. Did not meet criteria to go back to physical rehab. Will need to schedule  outpatient rehab when back in [place].  - On baclofen, gabapentin and PRN oxycodone.   Will discontinue baclofen outpatient.  Will give Flexeril as needed instead. Will give 20 tablets of oxycodone 5mg Q6H PRN. Patient to see PCP for further pain medication refills.      Debility- (present on admission)  Assessment & Plan  PMR recommended home with outpatient rehab vs. Skilled nursing facility. Per report, daughter and patient refused skilled nursing facility. Will need to schedule outpatient rehab with PCP when back in [place].  - DME 4 wheel walker with seat ordered.    Palpitations  Assessment & Plan  Stopped atenolol to avoid masking pulmonary embolism related tachycardia. Patient notes recent palpitations, but plans to address these with her PCP in [place].     Anemia- (present on admission)  Assessment & Plan  Between 9.4 to 10.4 for past 3 weeks. No further records.  -Chronic normocytic anemia.  -Iron panel unremarkable.  -Monitor with CBC    Spinal fracture of T8 vertebra (HCC)- (present on admission)  Assessment & Plan  Secondary to fall, recent fracture  Transferred from acute rehab center

## 2021-07-26 NOTE — ASSESSMENT & PLAN NOTE
Patient not on home oxygen and at one time required 4L during hospitalization. Likely due to acute PE and previous hx of KIM.    -Weaned down to 1L O2 today.  -Will need home oxygen concentrator before discharge to safely fly to [place].  -Advised pt to continue to use incentive spirometer hourly.

## 2021-07-26 NOTE — ASSESSMENT & PLAN NOTE
Found to have anemia with globin approximately 9.    -Normocytic non-iron deficiency anemia with hgb of 9.  Patient reports chronic.  -Up with PCP after discharge.

## 2021-07-26 NOTE — PROGRESS NOTES
Assumed care of patient, received report from NOC RN. Patient is A&O X 4 and is complaining of 5/10 pain in her back, patient verbalized that she would like her oxy 5mg when it is due at 0830. Vital signs stable overnight. Patient is on 1 L oxymask satting 92%. POC discussed with patient and she verbalized understanding. Call light within reach and fall precautions in place. Bed locked and in lowest position.

## 2021-07-26 NOTE — CARE PLAN
The patient is Stable - Low risk of patient condition declining or worsening    Shift Goals  Clinical Goals: Ween O2  Patient Goals: Ambulation  Family Goals:     Progress made toward(s) clinical / shift goals:  Patient was able to obtain an oxygen concentrator to take home to [place]. Will hopefully be discharging tomorrow. Is making progress towards goals.       Problem: Pain - Standard  Goal: Alleviation of pain or a reduction in pain to the patient’s comfort goal  Outcome: Progressing     Problem: Knowledge Deficit - Standard  Goal: Patient and family/care givers will demonstrate understanding of plan of care, disease process/condition, diagnostic tests and medications  Outcome: Progressing     Problem: Fall Risk  Goal: Patient will remain free from falls  Outcome: Progressing     Problem: Discharge Barriers/Planning  Goal: Patient's continuum of care needs are met  Outcome: Progressing     Problem: Respiratory  Goal: Patient will achieve/maintain optimum respiratory ventilation and gas exchange  Outcome: Progressing

## 2021-07-26 NOTE — DISCHARGE PLANNING
1135 - Per pt's request, this RNCM contacted [employer] and cancelled concentrator delivery. [employer] to deliver concentrator. Pt and daughter ordering concentrator. Pt pending delivery of concentrator and has NO other case management needs.    1530 - Spoke to reps with [employer], who state that the seated walker that was delivered to pt must be annotated in providers progress note that pt needs it. Dr. Reji Lobo notified by [employer].

## 2021-07-26 NOTE — CARE PLAN
Problem: Knowledge Deficit - Standard  Goal: Patient and family/care givers will demonstrate understanding of plan of care, disease process/condition, diagnostic tests and medications  Outcome: Progressing     The patient is Watcher - Medium risk of patient condition declining or worsening    Shift Goals  Clinical Goals: ambulate, eval O2 demands  Patient Goals: ween O2  Family Goals: do not apply oxygen at will    Progress made toward(s) clinical / shift goals:  Pt ambulated successfully and is progressing to ween O2, still requiring oxygen demand via oxy mask d/t pt stating she is not a nose breather. Sats ranging mid 90's while sitting.     Patient is not progressing towards the following goals:      Problem: Pain - Standard  Goal: Alleviation of pain or a reduction in pain to the patient’s comfort goal  Outcome: Not Progressing     Problem: Respiratory  Goal: Patient will achieve/maintain optimum respiratory ventilation and gas exchange  Outcome: Not Progressing

## 2021-07-27 ENCOUNTER — PHARMACY VISIT (OUTPATIENT)
Dept: PHARMACY | Facility: MEDICAL CENTER | Age: 76
End: 2021-07-27
Payer: COMMERCIAL

## 2021-07-27 VITALS
BODY MASS INDEX: 38.27 KG/M2 | HEIGHT: 66 IN | DIASTOLIC BLOOD PRESSURE: 67 MMHG | WEIGHT: 238.1 LBS | SYSTOLIC BLOOD PRESSURE: 101 MMHG | HEART RATE: 55 BPM | OXYGEN SATURATION: 92 % | TEMPERATURE: 97 F | RESPIRATION RATE: 18 BRPM

## 2021-07-27 PROCEDURE — RXMED WILLOW AMBULATORY MEDICATION CHARGE: Performed by: STUDENT IN AN ORGANIZED HEALTH CARE EDUCATION/TRAINING PROGRAM

## 2021-07-27 PROCEDURE — 700102 HCHG RX REV CODE 250 W/ 637 OVERRIDE(OP): Performed by: STUDENT IN AN ORGANIZED HEALTH CARE EDUCATION/TRAINING PROGRAM

## 2021-07-27 PROCEDURE — A9270 NON-COVERED ITEM OR SERVICE: HCPCS | Performed by: INTERNAL MEDICINE

## 2021-07-27 PROCEDURE — 99239 HOSP IP/OBS DSCHRG MGMT >30: CPT | Mod: GC | Performed by: INTERNAL MEDICINE

## 2021-07-27 PROCEDURE — 700102 HCHG RX REV CODE 250 W/ 637 OVERRIDE(OP): Performed by: INTERNAL MEDICINE

## 2021-07-27 PROCEDURE — 97535 SELF CARE MNGMENT TRAINING: CPT | Mod: CO

## 2021-07-27 PROCEDURE — A9270 NON-COVERED ITEM OR SERVICE: HCPCS | Performed by: STUDENT IN AN ORGANIZED HEALTH CARE EDUCATION/TRAINING PROGRAM

## 2021-07-27 RX ORDER — OXYCODONE HYDROCHLORIDE 10 MG/1
TABLET ORAL
Qty: 15 TABLET | Refills: 0 | OUTPATIENT
Start: 2021-07-27

## 2021-07-27 RX ADMIN — GABAPENTIN 400 MG: 400 CAPSULE ORAL at 12:00

## 2021-07-27 RX ADMIN — OXYCODONE 5 MG: 5 TABLET ORAL at 12:00

## 2021-07-27 RX ADMIN — DOCUSATE SODIUM 50 MG AND SENNOSIDES 8.6 MG 2 TABLET: 8.6; 5 TABLET, FILM COATED ORAL at 06:04

## 2021-07-27 RX ADMIN — OXYCODONE HYDROCHLORIDE 10 MG: 10 TABLET ORAL at 02:49

## 2021-07-27 RX ADMIN — OXYCODONE 5 MG: 5 TABLET ORAL at 08:35

## 2021-07-27 RX ADMIN — GABAPENTIN 400 MG: 400 CAPSULE ORAL at 06:04

## 2021-07-27 RX ADMIN — APIXABAN 5 MG: 5 TABLET, FILM COATED ORAL at 06:04

## 2021-07-27 ASSESSMENT — PAIN DESCRIPTION - PAIN TYPE
TYPE: ACUTE PAIN
TYPE: ACUTE PAIN

## 2021-07-27 ASSESSMENT — COGNITIVE AND FUNCTIONAL STATUS - GENERAL
DAILY ACTIVITIY SCORE: 24
SUGGESTED CMS G CODE MODIFIER DAILY ACTIVITY: CH

## 2021-07-27 NOTE — DISCHARGE INSTRUCTIONS
Discharge Instructions    Discharged to home by car with relative. Discharged via wheelchair, hospital escort: Yes.  Special equipment needed: Not Applicable    Be sure to schedule a follow-up appointment with your primary care doctor or any specialists as instructed.     Discharge Plan:   Diet Plan: Discussed  Activity Level: Discussed  Confirmed Follow up Appointment: Patient to Call and Schedule Appointment  Confirmed Symptoms Management: Discussed  Medication Reconciliation Updated: Yes    I understand that a diet low in cholesterol, fat, and sodium is recommended for good health. Unless I have been given specific instructions below for another diet, I accept this instruction as my diet prescription.   Other diet: Regular    Special Instructions: None    · Is patient discharged on Warfarin / Coumadin?   No     Depression / Suicide Risk    As you are discharged from this [facility] facility, it is important to learn how to keep safe from harming yourself.    Recognize the warning signs:  · Abrupt changes in personality, positive or negative- including increase in energy   · Giving away possessions  · Change in eating patterns- significant weight changes-  positive or negative  · Change in sleeping patterns- unable to sleep or sleeping all the time   · Unwillingness or inability to communicate  · Depression  · Unusual sadness, discouragement and loneliness  · Talk of wanting to die  · Neglect of personal appearance   · Rebelliousness- reckless behavior  · Withdrawal from people/activities they love  · Confusion- inability to concentrate     If you or a loved one observes any of these behaviors or has concerns about self-harm, here's what you can do:  · Talk about it- your feelings and reasons for harming yourself  · Remove any means that you might use to hurt yourself (examples: pills, rope, extension cords, firearm)  · Get professional help from the community (Mental Health, Substance Abuse, psychological  counseling)  · Do not be alone:Call your Safe Contact- someone whom you trust who will be there for you.  · Call your local CRISIS HOTLINE 400-2096 or 559-745-2060  · Call your local Children's Mobile Crisis Response Team [place] (746) 017-1258 or www.[employer]  · Call the toll free National Suicide Prevention Hotlines   · National Suicide Prevention Lifeline 564-867-FIRJ (4904)  · [employer] Hope Line Network 800-SUICIDE (770-8918)        Pulmonary Embolism    A pulmonary embolism (PE) is a sudden blockage or decrease of blood flow in one or both lungs. Most blockages come from a blood clot that forms in the vein of a lower leg, thigh, or arm (deep vein thrombosis, DVT) and travels to the lungs. A clot is blood that has thickened into a gel or solid. PE is a dangerous and life-threatening condition that needs to be treated right away.  What are the causes?  This condition is usually caused by a blood clot that forms in a vein and moves to the lungs. In rare cases, it may be caused by air, fat, part of a tumor, or other tissue that moves through the veins and into the lungs.  What increases the risk?  The following factors may make you more likely to develop this condition:  · Experiencing a traumatic injury, such as breaking a hip or leg.  · Having:  ? A spinal cord injury.  ? Orthopedic surgery, especially hip or knee replacement.  ? Any major surgery.  ? A stroke.  ? DVT.  ? Blood clots or blood clotting disease.  ? Long-term (chronic) lung or heart disease.  ? Cancer treated with chemotherapy.  ? A central venous catheter.  · Taking medicines that contain estrogen. These include birth control pills and hormone replacement therapy.  · Being:  ? Pregnant.  ? In the period of time after your baby is delivered (postpartum).  ? Older than age 60.  ? Overweight.  ? A smoker, especially if you have other risks.  What are the signs or symptoms?  Symptoms of this condition usually start suddenly and  include:  · Shortness of breath during activity or at rest.  · Coughing, coughing up blood, or coughing up blood-tinged mucus.  · Chest pain that is often worse with deep breaths.  · Rapid or irregular heartbeat.  · Feeling light-headed or dizzy.  · Fainting.  · Feeling anxious.  · Fever.  · Sweating.  · Pain and swelling in a leg. This is a symptom of DVT, which can lead to PE.  How is this diagnosed?  This condition may be diagnosed based on:  · Your medical history.  · A physical exam.  · Blood tests.  · CT pulmonary angiogram. This test checks blood flow in and around your lungs.  · Ventilation-perfusion scan, also called a lung VQ scan. This test measures air flow and blood flow to the lungs.  · An ultrasound of the legs.  How is this treated?  Treatment for this condition depends on many factors, such as the cause of your PE, your risk for bleeding or developing more clots, and other medical conditions you have. Treatment aims to remove, dissolve, or stop blood clots from forming or growing larger. Treatment may include:  · Medicines, such as:  ? Blood thinning medicines (anticoagulants) to stop clots from forming.  ? Medicines that dissolve clots (thrombolytics).  · Procedures, such as:  ? Using a flexible tube to remove a blood clot (embolectomy) or to deliver medicine to destroy it (catheter-directed thrombolysis).  ? Inserting a filter into a large vein that carries blood to the heart (inferior vena cava). This filter (vena cava filter) catches blood clots before they reach the lungs.  ? Surgery to remove the clot (surgical embolectomy). This is rare.  You may need a combination of immediate, long-term (up to 3 months after diagnosis), and extended (more than 3 months after diagnosis) treatments. Your treatment may continue for several months (maintenance therapy). You and your health care provider will work together to choose the treatment program that is best for you.  Follow these instructions at  home:  Medicines  · Take over-the-counter and prescription medicines only as told by your health care provider.  · If you are taking an anticoagulant medicine:  ? Take the medicine every day at the same time each day.  ? Understand what foods and drugs interact with your medicine.  ? Understand the side effects of this medicine, including excessive bruising or bleeding. Ask your health care provider or pharmacist about other side effects.  General instructions  · Wear a medical alert bracelet or carry a medical alert card that says you have had a PE and lists what medicines you take.  · Ask your health care provider when you may return to your normal activities. Avoid sitting or lying for a long time without moving.  · Maintain a healthy weight. Ask your health care provider what weight is healthy for you.  · Do not use any products that contain nicotine or tobacco, such as cigarettes, e-cigarettes, and chewing tobacco. If you need help quitting, ask your health care provider.  · Talk with your health care provider about any travel plans. It is important to make sure that you are still able to take your medicine while on trips.  · Keep all follow-up visits as told by your health care provider. This is important.  Contact a health care provider if:  · You missed a dose of your blood thinner medicine.  Get help right away if:  · You have:  ? New or increased pain, swelling, warmth, or redness in an arm or leg.  ? Numbness or tingling in an arm or leg.  ? Shortness of breath during activity or at rest.  ? A fever.  ? Chest pain.  ? A rapid or irregular heartbeat.  ? A severe headache.  ? Vision changes.  ? A serious fall or accident, or you hit your head.  ? Stomach (abdominal) pain.  ? Blood in your vomit, stool, or urine.  ? A cut that will not stop bleeding.  · You cough up blood.  · You feel light-headed or dizzy.  · You cannot move your arms or legs.  · You are confused or have memory loss.  These symptoms may  represent a serious problem that is an emergency. Do not wait to see if the symptoms will go away. Get medical help right away. Call your local emergency services (911 in the [place] Do not drive yourself to the hospital.  Summary  · A pulmonary embolism (PE) is a sudden blockage or decrease of blood flow in one or both lungs. PE is a dangerous and life-threatening condition that needs to be treated right aw    Thoracic Spine Fracture  A thoracic spine fracture is a break in one of the bones of the middle part of the back. The fracture can be mild or very bad. The most serious types cause the broken bones to:  Move out of place (unstable).  Damage or press on the main nerve in the spine (spinal cord).  In some cases, the bone that connects to the lower part of the back may also have a break (thoracolumbar fracture).  What are the causes?  This condition may be caused by:  A car accident.  A fall.  A sports accident.  Violent acts. These include assaults or gunshots.  What are the signs or symptoms?  Symptoms may include:  Back pain.  Trouble standing or walking.  Numbness.  Tingling.  Weakness.  Loss of movement.  Being unable to control when to pee or poop (incontinence).  How is this treated?  Treatment may include:  Medicines.  A cast or a brace.  Physical therapy.  Surgery. This may be needed for very bad fractures.  Follow these instructions at home:  Medicines  Take medicines only as told by your doctor.  Do not drive or use heavy machinery while taking pain medicine.  To prevent or treat trouble pooping (constipation) while you are taking prescription pain medicine, your doctor may recommend that you:  Drink enough fluid to keep your pee (urine) pale yellow.  Take over-the-counter or prescription medicines.  Eat foods that are high in fiber. This includes fresh fruits and vegetables, whole grains, and beans.  Limit foods that are high in fat and processed sugars. This includes fried or sweet foods.  If you  have a brace:  Wear the back brace as told by your doctor. Remove it only as told by your doctor.  Keep the brace clean.  If the brace is not waterproof:  Do not let it get wet.  Cover it with a watertight covering when you take a bath or a shower.  Activity  Stay in bed (on bed rest) only as told by your doctor.  Ask your doctor what is safe for you to do.  Return to your normal activities as told by your doctor.  Do back exercises (physical therapy) as told by your doctor.  Exercise often as told by your doctor.  Managing pain, stiffness, and swelling    If told, put ice on the injured area:  Put ice in a plastic bag.  Place a towel between your skin and the bag.  Leave the ice on for 20 minutes, 2-3 times a day.  General instructions  Do not use any products that contain nicotine or tobacco, such as cigarettes and e-cigarettes. If you need help quitting, ask your doctor.  Do not drink alcohol.  Keep all follow-up visits as told by your doctor. This is important.  Contact a doctor if:  You have a fever.  You have a cough that makes your pain worse.  Your pain medicine is not helping.  Your pain does not get better over time.  You cannot return to your normal activities as planned.  Get help right away if:  Your pain is bad and it suddenly gets worse.  You are not able to move any part of your body (paralysis) that is below the level of your injury.  You have numbness, tingling, or weakness in any part of your body that is below the level of your injury.  You cannot control when you pee (urinate) or when you poop (pass stool).  Summary  A thoracic spine fracture is a break in one of the bones of the middle part of the back.  A stable fracture can be treated with a back brace, activity restrictions, pain medicine, and physical therapy. A more severe fracture may require surgery.  Make sure you know what symptoms should cause you to get help right away.  This information is not intended to replace advice given to you  by your health care provider. Make sure you discuss any questions you have with your health care provider.  Document Released: 06/07/2011 Document Revised: 02/01/2019 Document Reviewed: 02/01/2019  [employer] Patient Education © 2020 [employer]  · ay.  · Treatments for this condition usually include medicines to thin your blood (anticoagulants) or medicines to break apart blood clots (thrombolytics).  · If you are given blood thinners, it is important to take the medicine every day at the same time each day.  · Understand what foods and drugs interact with any medicines that you are taking.  · If you have signs of PE or DVT, call your local emergency services (911 in the [place]  This information is not intended to replace advice given to you by your health care provider. Make sure you discuss any questions you have with your health care provider.  Document Released: 12/15/2001 Document Revised: 09/25/2019 Document Reviewed: 09/25/2019  [employer] Patient Education © 2020 [employer]

## 2021-07-27 NOTE — CARE PLAN
The patient is Stable - Low risk of patient condition declining or worsening    Shift Goals  Clinical Goals: Maintain oxygen  Patient Goals: Ambulation  Family Goals: get everything ready for discharge     Problem: Pain - Standard  Goal: Alleviation of pain or a reduction in pain to the patient’s comfort goal  Outcome: Progressing  Note: Pt complaining of 7/10 pain, medicated as per MD orders (see MAR). Provided relaxation techniques, rest, and repositioning the pt.       Problem: Respiratory  Goal: Patient will achieve/maintain optimum respiratory ventilation and gas exchange  Outcome: Progressing  Flowsheets (Taken 7/27/2021 0807)  O2 Delivery Device: Oxymask  Note: Collaborating with RT and Interdisciplinary team on pt's treatment measures to improve respiratory function.

## 2021-07-27 NOTE — PROGRESS NOTES
Discharged pt to Home via wheelchair with daughter. No IV. Pt understands verbal and written discharge instructions. Gave pt education on diet, medication, mobility, therapy, and oxygen requirments. Pt verbalized agreement to follow up with coumadin clinic or PCP within 3-5 days. Meds to beds given earlier, all personal belongings taken with pt. Answered all pt and/or family questions.

## 2021-07-27 NOTE — PROGRESS NOTES
1327: Discharge paperwork given and gone over with pt and daughter, all questions answered. Oxygen tank brought by daughter, currently charging. Meanwhile pt and daughter are requesting to speak to a therapist prior to discharge with tips/tricks questions about their flight back to [place]. Therapy called and informed.

## 2021-07-27 NOTE — PROGRESS NOTES
MD and rounding team at the bedside. Pt's daughter at the bedside, all questions answered. No further needs at this time.

## 2021-07-27 NOTE — DISCHARGE PLANNING
Meds-to-Beds: Discharge prescription orders listed below delivered to patient's bedside. ANGELA Hernandez notified. Patient counseled. Patient elected to have co-payment billed to patient account.     Reviewed signs and symptoms of bleeding and when to seek medical attention. Reviewed potential drug-drug interactions.      Peg Louie   Home Medication Instructions JENNIFER:[id number]    Printed on:07/27/21 9281   Medication Information                      apixaban (ELIQUIS) 5mg Tab  Take 1 tablet by mouth 2 times a day. Indications: DVT/PE             cyclobenzaprine (FLEXERIL) 5 mg tablet  Take 1 tablet by mouth 3 times a day as needed for Muscle Spasms for up to 31 days.             gabapentin (NEURONTIN) 400 MG Cap  Take 1 capsule by mouth 3 times a day.             oxyCODONE immediate release (ROXICODONE) 10 MG immediate release tablet  Take 1 tablet by mouth every 8 hours as needed for severe pain.                 Eusebia Cruz, PharmD

## 2021-07-27 NOTE — THERAPY
Occupational Therapy  Daily Treatment     Patient Name: Peg Louie  Age:  76 y.o., Sex:  female  Medical Record #: [id number]  Today's Date: 7/27/2021       Precautions: Fall Risk, Spinal / Back Precautions , TLSO (Thoracolumbosacral orthosis)  Comments: T7/8 compression fxs     Assessment    Pt seen for OT tx. Pt's daughter asking questions about possible DME/AE needed to increase independence in ADLs and ADL transfers. Thorough education on maintaining spinal precautions during ADLs and ADL transfers. Pt able to verbalize precautions appropriately and verbalized understanding of maintaining them during mobility in airport. Pt's daughter will be present to assist w/ initially w/ ADLs as needed. At this time pt has met all goals and will no longer be followed while in house.     Plan    Discharge secondary to goals met.    DC Equipment Recommendations: None  Discharge Recommendations: Anticipate that the patient will have no further occupational therapy needs after discharge from the hospital       07/27/21 1432   Active ROM Upper Body   Active ROM Upper Body  WDL   Strength Upper Body   Upper Body Strength  WDL   Balance   Sitting Balance (Static) Fair +   Sitting Balance (Dynamic) Fair +   Standing Balance (Static) Fair +   Standing Balance (Dynamic) Fair +   Weight Shift Sitting Good   Weight Shift Standing Good   Bed Mobility    Supine to Sit Supervised   Sit to Supine Supervised   Scooting Supervised   Rolling Supervised   Activities of Daily Living   Grooming Supervision;Standing   Upper Body Dressing Supervision   Lower Body Dressing Supervision   Toileting Supervision   Functional Mobility   Sit to Stand Supervised   Bed, Chair, Wheelchair Transfer Supervised   Toilet Transfers Supervised   Short Term Goals   Short Term Goal # 1 set up for TLSO don/ doff   Goal Outcome # 1 Goal met   Short Term Goal # 2 set up for LB dressingt asks, appropriate use of AE   Goal Outcome # 2 Goal met   Short Term Goal  # 3 complete basic ADLs without need for cueing to maintain back precautions   Goal Outcome # 3 Goal met   Short Term Goal # 4 tolerate 10 minutes standing at sink, with SBA, for ADL participation   Goal Outcome # 4 Goal met   Anticipated Discharge Equipment and Recommendations   DC Equipment Recommendations None   Discharge Recommendations Anticipate that the patient will have no further occupational therapy needs after discharge from the hospital
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
## 2021-07-27 NOTE — PROGRESS NOTES
Assumed care at 1900, bedside report received from Erica MILLER. Pt. Is not on the monitor. Initial assessment completed, orders reviewed, call light within reach, bed alarm not in use, and hourly rounding in place. POC addressed with patient, no additional questions at this time.

## 2021-08-03 NOTE — DISCHARGE PLANNING
ANITAW received a forwarded email from patient's daughter Hayden requesting medical records. ANITAW provided Hayden with medical record department phone # via email. ANITAW also informed Hayden that patient's PCP can request records.

## 2022-08-16 NOTE — ASSESSMENT & PLAN NOTE
At 152, not actively  Bleeding    Start taking zyrtec  Please continue to check your blood pressure, if it is consistently above 150-160, then take an amlodipine.    Tylenol three times a day

## 2022-09-30 NOTE — FLOWSHEET NOTE
07/13/21 1647   Events/Summary/Plan   Events/Summary/Plan 02 spot check, no further wean yet   Vital Signs   Pulse 77   Respiration 18   Pulse Oximetry 94 %   $ Pulse Oximetry (Spot Check) Yes   Respiratory Assessment   Level of Consciousness Alert   Chest Exam   Work Of Breathing / Effort Within Normal Limits   Oxygen   O2 (LPM) 2   O2 Delivery Device Silicone Nasal Cannula      Need to continue your regular medications and follow-up with your own doctor in several days if continued difficulty. Sure you are calling somebody to help you get up and move around at home.

## 2024-03-15 NOTE — CARE PLAN
Problem: Pain - Standard  Goal: Alleviation of pain or a reduction in pain to the patient’s comfort goal  Outcome: Progressing     Problem: Knowledge Deficit - Standard  Goal: Patient and family/care givers will demonstrate understanding of plan of care, disease process/condition, diagnostic tests and medications  Outcome: Progressing     Problem: Fall Risk  Goal: Patient will remain free from falls  Outcome: Progressing     The patient is Stable - Low risk of patient condition declining or worsening    Shift Goals  Clinical Goals: Wean O2`  Patient Goals: Pain control  Family Goals: NA    Progress made toward(s) clinical / shift goals:  Patient educated on use of the  to aid in weaning down oxygen levels. Ideal O2 per MD >92%. Patient on 0.5L NC at this time. Will continue to try and wean patient. Fall precautions in place. Bed in lowest position. Non-skid socks in place. Personal belongings within reach. Mobility sign on door. Call light within reach. Pt educated regarding fall prevention and verbalized understanding.          Returned call to pt. Patiently recently discharged 3/13/24 s/p KTX 3/11/24. Pt reports 3lb weight gain since this AM and decreased UOP. Pt reports eating 1 biscuit today and normal/adequate water intake. Pt reports incisional swelling/pain is the same as yesterday. Unable to schedule outpatient US/bladder scan appointments in timely manner. Dr. Arnold notified, pt instructed to present to ED for labs/imaging.  -UPDATE: Called and spoke with patient after discussing with txp team; pt reports he was able to urinate. Denies feeling bladder fullness, does report swelling. Water intake 1440ml with UOP 625ml. Discussed with Dr. Arnold - labs today and Monday, US scheduled Tuesday, 20mg lasix PO daily started.  Pt verbalized understanding.  ----- Message from Adwoa Tavarez sent at 3/15/2024 11:43 AM CDT -----  Regarding: Patient advice  Contact: Pt  [phone]          Name of Caller:  Colten     Contact Preference:[phone]    Nature of Call: Requesting a call back have concerns with gaining little weight 3lbs since this morning and not using the bathroom as  regularly  as prior to